# Patient Record
Sex: MALE | Race: OTHER | HISPANIC OR LATINO | URBAN - METROPOLITAN AREA
[De-identification: names, ages, dates, MRNs, and addresses within clinical notes are randomized per-mention and may not be internally consistent; named-entity substitution may affect disease eponyms.]

---

## 2022-09-21 ENCOUNTER — EMERGENCY (EMERGENCY)
Facility: HOSPITAL | Age: 62
LOS: 1 days | Discharge: HOME | End: 2022-09-21
Attending: EMERGENCY MEDICINE
Payer: COMMERCIAL

## 2022-09-21 VITALS
WEIGHT: 220.9 LBS | RESPIRATION RATE: 18 BRPM | TEMPERATURE: 98 F | HEART RATE: 92 BPM | SYSTOLIC BLOOD PRESSURE: 120 MMHG | OXYGEN SATURATION: 100 % | DIASTOLIC BLOOD PRESSURE: 77 MMHG

## 2022-09-21 VITALS
SYSTOLIC BLOOD PRESSURE: 128 MMHG | OXYGEN SATURATION: 99 % | RESPIRATION RATE: 18 BRPM | TEMPERATURE: 99 F | DIASTOLIC BLOOD PRESSURE: 79 MMHG | HEART RATE: 69 BPM

## 2022-09-21 LAB
ALBUMIN SERPL ELPH-MCNC: 4.1 G/DL — SIGNIFICANT CHANGE UP (ref 3.5–5)
ALP SERPL-CCNC: 72 U/L — SIGNIFICANT CHANGE UP (ref 40–120)
ALT FLD-CCNC: 25 U/L DA — SIGNIFICANT CHANGE UP (ref 10–60)
ANION GAP SERPL CALC-SCNC: 9 MMOL/L — SIGNIFICANT CHANGE UP (ref 5–17)
APPEARANCE UR: CLEAR — SIGNIFICANT CHANGE UP
AST SERPL-CCNC: 15 U/L — SIGNIFICANT CHANGE UP (ref 10–40)
BASOPHILS # BLD AUTO: 0.04 K/UL — SIGNIFICANT CHANGE UP (ref 0–0.2)
BASOPHILS NFR BLD AUTO: 0.4 % — SIGNIFICANT CHANGE UP (ref 0–2)
BILIRUB SERPL-MCNC: 0.4 MG/DL — SIGNIFICANT CHANGE UP (ref 0.2–1.2)
BILIRUB UR-MCNC: NEGATIVE — SIGNIFICANT CHANGE UP
BUN SERPL-MCNC: 23 MG/DL — HIGH (ref 7–18)
CALCIUM SERPL-MCNC: 8.9 MG/DL — SIGNIFICANT CHANGE UP (ref 8.4–10.5)
CHLORIDE SERPL-SCNC: 111 MMOL/L — HIGH (ref 96–108)
CO2 SERPL-SCNC: 21 MMOL/L — LOW (ref 22–31)
COLOR SPEC: YELLOW — SIGNIFICANT CHANGE UP
CREAT SERPL-MCNC: 1.09 MG/DL — SIGNIFICANT CHANGE UP (ref 0.5–1.3)
DIFF PNL FLD: ABNORMAL
EGFR: 76 ML/MIN/1.73M2 — SIGNIFICANT CHANGE UP
EOSINOPHIL # BLD AUTO: 0.02 K/UL — SIGNIFICANT CHANGE UP (ref 0–0.5)
EOSINOPHIL NFR BLD AUTO: 0.2 % — SIGNIFICANT CHANGE UP (ref 0–6)
GLUCOSE SERPL-MCNC: 121 MG/DL — HIGH (ref 70–99)
GLUCOSE UR QL: NEGATIVE — SIGNIFICANT CHANGE UP
HCT VFR BLD CALC: 36 % — LOW (ref 39–50)
HGB BLD-MCNC: 12.1 G/DL — LOW (ref 13–17)
HIV 1 & 2 AB SERPL IA.RAPID: SIGNIFICANT CHANGE UP
IMM GRANULOCYTES NFR BLD AUTO: 0.7 % — SIGNIFICANT CHANGE UP (ref 0–0.9)
KETONES UR-MCNC: NEGATIVE — SIGNIFICANT CHANGE UP
LACTATE SERPL-SCNC: 1.8 MMOL/L — SIGNIFICANT CHANGE UP (ref 0.7–2)
LEUKOCYTE ESTERASE UR-ACNC: ABNORMAL
LIDOCAIN IGE QN: 80 U/L — SIGNIFICANT CHANGE UP (ref 73–393)
LYMPHOCYTES # BLD AUTO: 1.35 K/UL — SIGNIFICANT CHANGE UP (ref 1–3.3)
LYMPHOCYTES # BLD AUTO: 13 % — SIGNIFICANT CHANGE UP (ref 13–44)
MCHC RBC-ENTMCNC: 31.1 PG — SIGNIFICANT CHANGE UP (ref 27–34)
MCHC RBC-ENTMCNC: 33.6 GM/DL — SIGNIFICANT CHANGE UP (ref 32–36)
MCV RBC AUTO: 92.5 FL — SIGNIFICANT CHANGE UP (ref 80–100)
MONOCYTES # BLD AUTO: 0.95 K/UL — HIGH (ref 0–0.9)
MONOCYTES NFR BLD AUTO: 9.1 % — SIGNIFICANT CHANGE UP (ref 2–14)
NEUTROPHILS # BLD AUTO: 7.99 K/UL — HIGH (ref 1.8–7.4)
NEUTROPHILS NFR BLD AUTO: 76.6 % — SIGNIFICANT CHANGE UP (ref 43–77)
NITRITE UR-MCNC: NEGATIVE — SIGNIFICANT CHANGE UP
NRBC # BLD: 0 /100 WBCS — SIGNIFICANT CHANGE UP (ref 0–0)
PH UR: 6.5 — SIGNIFICANT CHANGE UP (ref 5–8)
PLATELET # BLD AUTO: 286 K/UL — SIGNIFICANT CHANGE UP (ref 150–400)
POTASSIUM SERPL-MCNC: 3.7 MMOL/L — SIGNIFICANT CHANGE UP (ref 3.5–5.3)
POTASSIUM SERPL-SCNC: 3.7 MMOL/L — SIGNIFICANT CHANGE UP (ref 3.5–5.3)
PROT SERPL-MCNC: 7.4 G/DL — SIGNIFICANT CHANGE UP (ref 6–8.3)
PROT UR-MCNC: NEGATIVE — SIGNIFICANT CHANGE UP
RBC # BLD: 3.89 M/UL — LOW (ref 4.2–5.8)
RBC # FLD: 13.1 % — SIGNIFICANT CHANGE UP (ref 10.3–14.5)
SARS-COV-2 RNA SPEC QL NAA+PROBE: SIGNIFICANT CHANGE UP
SODIUM SERPL-SCNC: 141 MMOL/L — SIGNIFICANT CHANGE UP (ref 135–145)
SP GR SPEC: 1.01 — SIGNIFICANT CHANGE UP (ref 1.01–1.02)
UROBILINOGEN FLD QL: NEGATIVE — SIGNIFICANT CHANGE UP
WBC # BLD: 10.42 K/UL — SIGNIFICANT CHANGE UP (ref 3.8–10.5)
WBC # FLD AUTO: 10.42 K/UL — SIGNIFICANT CHANGE UP (ref 3.8–10.5)

## 2022-09-21 PROCEDURE — 99285 EMERGENCY DEPT VISIT HI MDM: CPT

## 2022-09-21 PROCEDURE — 74176 CT ABD & PELVIS W/O CONTRAST: CPT | Mod: 26,MA

## 2022-09-21 PROCEDURE — 51702 INSERT TEMP BLADDER CATH: CPT

## 2022-09-21 PROCEDURE — 81001 URINALYSIS AUTO W/SCOPE: CPT

## 2022-09-21 PROCEDURE — 87635 SARS-COV-2 COVID-19 AMP PRB: CPT

## 2022-09-21 PROCEDURE — 36415 COLL VENOUS BLD VENIPUNCTURE: CPT

## 2022-09-21 PROCEDURE — 74176 CT ABD & PELVIS W/O CONTRAST: CPT | Mod: MA

## 2022-09-21 PROCEDURE — 80053 COMPREHEN METABOLIC PANEL: CPT

## 2022-09-21 PROCEDURE — 86703 HIV-1/HIV-2 1 RESULT ANTBDY: CPT

## 2022-09-21 PROCEDURE — 87086 URINE CULTURE/COLONY COUNT: CPT

## 2022-09-21 PROCEDURE — 99284 EMERGENCY DEPT VISIT MOD MDM: CPT | Mod: 25

## 2022-09-21 PROCEDURE — 83690 ASSAY OF LIPASE: CPT

## 2022-09-21 PROCEDURE — 83605 ASSAY OF LACTIC ACID: CPT

## 2022-09-21 PROCEDURE — 85025 COMPLETE CBC W/AUTO DIFF WBC: CPT

## 2022-09-21 RX ORDER — SODIUM CHLORIDE 9 MG/ML
1000 INJECTION INTRAMUSCULAR; INTRAVENOUS; SUBCUTANEOUS ONCE
Refills: 0 | Status: DISCONTINUED | OUTPATIENT
Start: 2022-09-21 | End: 2022-09-24

## 2022-09-21 NOTE — ED PROVIDER NOTE - PATIENT PORTAL LINK FT
You can access the FollowMyHealth Patient Portal offered by [facility] by registering at the following website: [email or web address]. By joining [facility]’s FollowMyHealth portal, you will also be able to view your health information using other applications (apps) compatible with our system.

## 2022-09-21 NOTE — ED PROVIDER NOTE - CPE EDP RESP NORM
Lab Results   Component Value Date    HGBA1C 7 4 (H) 12/18/2020       Recent Labs     01/05/21  1645 01/05/21  2104 01/06/21  0736 01/06/21  1033   POCGLU 157* 122 98 157*       Blood Sugar Average: Last 72 hrs:  (P) 131 [id number]   Underwent definitive surgical management by the podiatry service  He received 14 days of antibiotics here, and is now clear of infection  Did have Staph bacteremia managed by the infectious disease service normal...

## 2022-09-21 NOTE — ED PROVIDER NOTE - NSFOLLOWUPINSTRUCTIONS_ED_ALL_ED_FT
Urinary Retention in Men    WHAT YOU NEED TO KNOW:    Urinary retention is a condition that develops when your bladder does not empty completely when you urinate.   Male Reproductive System         DISCHARGE INSTRUCTIONS:    Medicines:   •Medicines can help decrease the size of your prostate, fight infection, and help you urinate more easily.      •Take your medicine as directed. Contact your healthcare provider if you think your medicine is not helping or if you have side effects. Tell your provider if you are allergic to any medicine. Keep a list of the medicines, vitamins, and herbs you take. Include the amounts, and when and why you take them. Bring the list or the pill bottles to follow-up visits. Carry your medicine list with you in case of an emergency.      Parisi catheter care: You may need a Parisi catheter for up to 2 weeks at home. Healthcare providers will give you a smaller leg bag to collect urine. Keep the bag below your waist. This will prevent urine from flowing back into your bladder and causing an infection or other problems. Also, keep the tube free of kinks so the urine will drain properly. Do not pull on the catheter. This can cause pain and bleeding, and may cause the catheter to come out. Ask your healthcare provider or urologist for more information on Parisi catheter care.    Urinate regularly: When your catheter is removed, do not let your bladder become too full before you urinate. Set regular times each day to urinate. Urinate as soon as you feel the need or at least every 3 hours while you are awake. Do not drink liquids before you go to bed. Urinate right before you go to bed.    Follow up with your healthcare provider or urologist as directed: Write down your questions so you remember to ask them during your visits.     Contact your healthcare provider or urologist if:   •You have a fever.      •You have pain when you urinate.      •You have blood in your urine.      •You have problems with your catheter.      •You have questions or concerns about your condition or care.      Return to the emergency department if:   •You have severe abdominal pain.      •You are breathing faster than usual.      •Your heartbeat is faster than usual.      •Your face, hands, feet, or ankles are swollen.

## 2022-09-21 NOTE — ED ADULT TRIAGE NOTE - CHIEF COMPLAINT QUOTE
as per pt " I have lower abdominal pain for 1 day and severe pain will urinating and I have trouble in urinating because of pain "

## 2022-09-22 LAB
CULTURE RESULTS: NO GROWTH — SIGNIFICANT CHANGE UP
SPECIMEN SOURCE: SIGNIFICANT CHANGE UP

## 2022-09-22 PROCEDURE — 51703 INSERT BLADDER CATH COMPLEX: CPT

## 2022-09-30 PROBLEM — C61 MALIGNANT NEOPLASM OF PROSTATE: Chronic | Status: ACTIVE | Noted: 2022-09-21

## 2022-10-03 ENCOUNTER — OUTPATIENT (OUTPATIENT)
Dept: OUTPATIENT SERVICES | Facility: HOSPITAL | Age: 62
LOS: 1 days | End: 2022-10-03
Payer: COMMERCIAL

## 2022-10-03 VITALS
WEIGHT: 212.08 LBS | TEMPERATURE: 98 F | HEART RATE: 81 BPM | SYSTOLIC BLOOD PRESSURE: 122 MMHG | OXYGEN SATURATION: 99 % | HEIGHT: 68 IN | RESPIRATION RATE: 18 BRPM | DIASTOLIC BLOOD PRESSURE: 76 MMHG

## 2022-10-03 DIAGNOSIS — M54.50 LOW BACK PAIN, UNSPECIFIED: ICD-10-CM

## 2022-10-03 DIAGNOSIS — E55.9 VITAMIN D DEFICIENCY, UNSPECIFIED: ICD-10-CM

## 2022-10-03 DIAGNOSIS — N40.1 BENIGN PROSTATIC HYPERPLASIA WITH LOWER URINARY TRACT SYMPTOMS: ICD-10-CM

## 2022-10-03 DIAGNOSIS — Z01.818 ENCOUNTER FOR OTHER PREPROCEDURAL EXAMINATION: ICD-10-CM

## 2022-10-03 DIAGNOSIS — C61 MALIGNANT NEOPLASM OF PROSTATE: ICD-10-CM

## 2022-10-03 LAB — BLD GP AB SCN SERPL QL: SIGNIFICANT CHANGE UP

## 2022-10-03 PROCEDURE — G0463: CPT

## 2022-10-03 NOTE — H&P PST ADULT - ASSESSMENT
This is a 62 year old  male with PMH of vitamin D deficiency, low back pain, prostate cancer treated with chemotherapy presents with enlarged prostate with lower urinary tract symptoms. Pt is scheduled for cystoscopy, transurethral resection of prostate with green light laser on 10/11/2022.  MELYSSA stop bang score 4. Pt denies history of MELYSSA, never did sleep study.

## 2022-10-03 NOTE — H&P PST ADULT - FALL HARM RISK - UNIVERSAL INTERVENTIONS
Bed in lowest position, wheels locked, appropriate side rails in place/Call bell, personal items and telephone in reach/Instruct patient to call for assistance before getting out of bed or chair/Non-slip footwear when patient is out of bed/[place] to call system/Physically safe environment - no spills, clutter or unnecessary equipment/Purposeful Proactive Rounding/Room/bathroom lighting operational, light cord in reach

## 2022-10-03 NOTE — H&P PST ADULT - NEGATIVE GENERAL SYMPTOMS
Patient is waiting for an inhaler and something to stop smoking    [facility]/[place]       no fever/no chills/no sweating

## 2022-10-03 NOTE — H&P PST ADULT - GENITOURINARY COMMENTS
koehler catheter in situ draining yellow urine Parisi catheter in situ draining yellow urine Parisi catheter in situ due to urinary retention secondary to enlarged prostate; h/o prostate cancer

## 2022-10-03 NOTE — H&P PST ADULT - NSANTHOSAYNRD_GEN_A_CORE
No. MELYSSA screening performed.  STOP BANG Legend: 0-2 = LOW Risk; 3-4 = INTERMEDIATE Risk; 5-8 = HIGH Risk

## 2022-10-03 NOTE — H&P PST ADULT - NSICDXPASTMEDICALHX_GEN_ALL_CORE_FT
PAST MEDICAL HISTORY:  CA of prostate     Enlarged prostate with lower urinary tract symptoms (LUTS)

## 2022-10-03 NOTE — H&P PST ADULT - HISTORY OF PRESENT ILLNESS
This is a 62 year old  male with PMH of vitamin D deficiency, low back pain, prostate cancer treated with chemotherapy presents with c/o urinary retentiondue to enlarged prostate. Pt had Parisi catheter inserted in ER for relief. Pt is scheduled for cystoscopy, transurethral resection of prostate with green light laser on 10/11/2022.

## 2022-10-03 NOTE — H&P PST ADULT - PROBLEM SELECTOR PLAN 1
Pt is scheduled for cystoscopy, transurethral resection of prostate with green light laser on 10/11/2022.  MELYSSA stop bang score 4. Pt denies history of MELYSSA, never did sleep study.   Instructed to continue Tamsulosin.  Preoperative instructions discussed with pt and given to pt. Instructed pt to notify security when he arrives in the lobby of the \[facility]\"" that he is here for surgery, that he will need someone to come to the hospital to pick him up after surgery, not to eat or drink anything after midnight the night before the surgery, to avoid NSAIDs such as Ibuprofen, Motrin, Aleve, Advil, naproxen before surgery, to take Tylenol if needed for pain, to report if he has been exposed to anyone with any contagious diseases including Covid-19 and Monkeypox or if he is exhibiting any symptoms of COVID-19 or has any rash or if he is exhibiting any symptoms of COVID-19,  to keep appointment for COVID-19  test 3 days before surgery.    Instructed about use of Chlorhexidine 4% soap for 3 days before surgery including the morning of the surgery to prevent infection. Verbalized understanding of instructions given.

## 2022-10-03 NOTE — H&P PST ADULT - PROBLEM SELECTOR PLAN 3
Pt instructed to avoid NSAIDs such as aleve, naproxen 1 week before surgery.    Advised pt and pt's wife to take Tylenol as needed for pain. Stated understanding of instructions given.

## 2022-10-11 ENCOUNTER — OUTPATIENT (OUTPATIENT)
Dept: OUTPATIENT SERVICES | Facility: HOSPITAL | Age: 62
LOS: 1 days | End: 2022-10-11
Payer: COMMERCIAL

## 2022-10-11 VITALS
DIASTOLIC BLOOD PRESSURE: 80 MMHG | HEIGHT: 68 IN | SYSTOLIC BLOOD PRESSURE: 111 MMHG | OXYGEN SATURATION: 99 % | HEART RATE: 82 BPM | TEMPERATURE: 98 F | WEIGHT: 212.08 LBS | RESPIRATION RATE: 16 BRPM

## 2022-10-11 VITALS
HEART RATE: 72 BPM | DIASTOLIC BLOOD PRESSURE: 81 MMHG | TEMPERATURE: 98 F | SYSTOLIC BLOOD PRESSURE: 134 MMHG | RESPIRATION RATE: 16 BRPM | OXYGEN SATURATION: 100 %

## 2022-10-11 DIAGNOSIS — N40.1 BENIGN PROSTATIC HYPERPLASIA WITH LOWER URINARY TRACT SYMPTOMS: ICD-10-CM

## 2022-10-11 LAB — BLD GP AB SCN SERPL QL: SIGNIFICANT CHANGE UP

## 2022-10-11 PROCEDURE — 86850 RBC ANTIBODY SCREEN: CPT

## 2022-10-11 PROCEDURE — 36415 COLL VENOUS BLD VENIPUNCTURE: CPT

## 2022-10-11 PROCEDURE — 86901 BLOOD TYPING SEROLOGIC RH(D): CPT

## 2022-10-11 PROCEDURE — 86900 BLOOD TYPING SEROLOGIC ABO: CPT

## 2022-10-11 PROCEDURE — 52648 LASER SURGERY OF PROSTATE: CPT

## 2022-10-11 PROCEDURE — C1889: CPT

## 2022-10-11 DEVICE — FIBER MOXY REG: Type: IMPLANTABLE DEVICE | Site: CYSTOSCOPY TRANSURETHRA RESECTION OF PROSTATE | Status: FUNCTIONAL

## 2022-10-11 RX ORDER — SODIUM CHLORIDE 9 MG/ML
3 INJECTION INTRAMUSCULAR; INTRAVENOUS; SUBCUTANEOUS EVERY 8 HOURS
Refills: 0 | Status: DISCONTINUED | OUTPATIENT
Start: 2022-10-11 | End: 2022-10-11

## 2022-10-11 RX ORDER — ONDANSETRON 8 MG/1
4 TABLET, FILM COATED ORAL ONCE
Refills: 0 | Status: DISCONTINUED | OUTPATIENT
Start: 2022-10-11 | End: 2022-10-11

## 2022-10-11 RX ORDER — HYDROMORPHONE HYDROCHLORIDE 2 MG/ML
0.5 INJECTION INTRAMUSCULAR; INTRAVENOUS; SUBCUTANEOUS
Refills: 0 | Status: DISCONTINUED | OUTPATIENT
Start: 2022-10-11 | End: 2022-10-11

## 2022-10-11 RX ORDER — PHENAZOPYRIDINE HCL 100 MG
1 TABLET ORAL
Qty: 9 | Refills: 0
Start: 2022-10-11 | End: 2022-10-13

## 2022-10-11 RX ORDER — PHENAZOPYRIDINE HCL 100 MG
1 TABLET ORAL
Qty: 0 | Refills: 0 | DISCHARGE

## 2022-10-11 RX ORDER — CIPROFLOXACIN LACTATE 400MG/40ML
1 VIAL (ML) INTRAVENOUS
Qty: 6 | Refills: 0
Start: 2022-10-11 | End: 2022-10-13

## 2022-10-11 RX ORDER — PHENAZOPYRIDINE HCL 100 MG
100 TABLET ORAL ONCE
Refills: 0 | Status: COMPLETED | OUTPATIENT
Start: 2022-10-11 | End: 2022-10-11

## 2022-10-11 RX ORDER — FENTANYL CITRATE 50 UG/ML
25 INJECTION INTRAVENOUS
Refills: 0 | Status: DISCONTINUED | OUTPATIENT
Start: 2022-10-11 | End: 2022-10-11

## 2022-10-11 RX ADMIN — HYDROMORPHONE HYDROCHLORIDE 0.5 MILLIGRAM(S): 2 INJECTION INTRAMUSCULAR; INTRAVENOUS; SUBCUTANEOUS at 10:24

## 2022-10-11 RX ADMIN — HYDROMORPHONE HYDROCHLORIDE 0.5 MILLIGRAM(S): 2 INJECTION INTRAMUSCULAR; INTRAVENOUS; SUBCUTANEOUS at 10:11

## 2022-10-11 RX ADMIN — FENTANYL CITRATE 25 MICROGRAM(S): 50 INJECTION INTRAVENOUS at 11:14

## 2022-10-11 RX ADMIN — FENTANYL CITRATE 25 MICROGRAM(S): 50 INJECTION INTRAVENOUS at 10:47

## 2022-10-11 RX ADMIN — SODIUM CHLORIDE 3 MILLILITER(S): 9 INJECTION INTRAMUSCULAR; INTRAVENOUS; SUBCUTANEOUS at 07:27

## 2022-10-11 RX ADMIN — HYDROMORPHONE HYDROCHLORIDE 0.5 MILLIGRAM(S): 2 INJECTION INTRAMUSCULAR; INTRAVENOUS; SUBCUTANEOUS at 10:21

## 2022-10-11 RX ADMIN — Medication 100 MILLIGRAM(S): at 12:12

## 2022-10-11 NOTE — BRIEF OPERATIVE NOTE - NSICDXBRIEFPROCEDURE_GEN_ALL_CORE_FT
PROCEDURES:  Cystoscopy, with TURP using laser 11-Oct-2022 09:50:38 Greenlight channel turp for metastatic prostate cancer. Concepcion Boyd

## 2022-10-11 NOTE — ASU DISCHARGE PLAN (ADULT/PEDIATRIC) - ASU DC SPECIAL INSTRUCTIONSFT
CATHETER: the nurses will review instructions and care before you go home.   GENERAL: It is common to have blood in your urine after your procedure. It may be pink or even red; inform your doctor if you have a significant amount of clot in the urine or if you are unable to void at all or if your catheter stops draining. The urine may clear and then become bloody again especially as you are more physically active. It is not uncommon to have some burning when you urinate, this will gradually improve. With a catheter in place, it is not uncommon to have occasional leakage or urine or blood around the catheter. Please call your urologist if this is excessive and/or the urine is not draining through the catheter into the bag.  BATHING: You may shower or bathe. If going home with koehler, shower only until catheter is removed.  DIET: You may resume your regular diet and regular medication regimen.  PAIN: You may take Tylenol (acetaminophen) 650-975mg and/or Motrin (ibuprofen) 400-600mg, both available over the counter, for pain every 6 hours as needed. Do not exceed 4000mg of Tylenol (acetaminophen) daily. You may alternate these medications such that you take one or the other every 3 hours for around the clock pain coverage. Also give pyridium, take as instructed.  ANTIBIOTICS: You will be given a prescription for an antibiotic, please take this medication as instructed and be sure to complete the entire course.  STOOL SOFTENERS: Do not allow yourself to become constipated as straining may cause bleeding. Take stool softeners or a laxative (ex. Miralax, Colace, Senokot, ExLax, etc), available over the counter, if needed.  ACTIVITY: No heavy lifting or strenuous exercise until you are evaluated at your post-operative appointment. Otherwise, you may return to your usual level of physical activity.  FOLLOW-UP: Follow up Monday for TOV with Dr. farr.  CALL YOUR UROLOGIST IF: You have any bleeding that does not stop, inability to void >8 hours, fever over 100.4 F, chills, persistent nausea/vomiting, changes in your incision concerning for infection, or if your pain is not controlled on your discharge pain medications.

## 2022-10-11 NOTE — ASU DISCHARGE PLAN (ADULT/PEDIATRIC) - CARE PROVIDER_API CALL
Diogenes Goldsmith)  Urology  [address], [address] [place] [address]  Phone: [phone]  Fax: [phone]  Follow Up Time: 1 week

## 2022-10-11 NOTE — BRIEF OPERATIVE NOTE - OPERATION/FINDINGS
TURP, Greenlight, (Channel turp for bladder outlet obstruction 2/2 metastatic prostate cancer) With Parisi.

## 2023-08-29 ENCOUNTER — INPATIENT (INPATIENT)
Facility: HOSPITAL | Age: 63
LOS: 16 days | Discharge: HOSPICE | DRG: 871 | End: 2023-09-15
Attending: INTERNAL MEDICINE | Admitting: INTERNAL MEDICINE
Payer: COMMERCIAL

## 2023-08-29 VITALS
TEMPERATURE: 97 F | HEART RATE: 143 BPM | DIASTOLIC BLOOD PRESSURE: 53 MMHG | WEIGHT: 198.42 LBS | SYSTOLIC BLOOD PRESSURE: 142 MMHG | OXYGEN SATURATION: 99 % | RESPIRATION RATE: 20 BRPM

## 2023-08-29 PROBLEM — N40.1 BENIGN PROSTATIC HYPERPLASIA WITH LOWER URINARY TRACT SYMPTOMS: Chronic | Status: ACTIVE | Noted: 2022-10-03

## 2023-08-29 LAB
ALBUMIN SERPL ELPH-MCNC: 2.6 G/DL — LOW (ref 3.5–5)
ALP SERPL-CCNC: 172 U/L — HIGH (ref 40–120)
ALT FLD-CCNC: 17 U/L DA — SIGNIFICANT CHANGE UP (ref 10–60)
ANION GAP SERPL CALC-SCNC: 11 MMOL/L — SIGNIFICANT CHANGE UP (ref 5–17)
ANISOCYTOSIS BLD QL: SLIGHT — SIGNIFICANT CHANGE UP
APTT BLD: 31.1 SEC — SIGNIFICANT CHANGE UP (ref 24.5–35.6)
AST SERPL-CCNC: 17 U/L — SIGNIFICANT CHANGE UP (ref 10–40)
BASOPHILS # BLD AUTO: 0 K/UL — SIGNIFICANT CHANGE UP (ref 0–0.2)
BASOPHILS NFR BLD AUTO: 0 % — SIGNIFICANT CHANGE UP (ref 0–2)
BILIRUB SERPL-MCNC: 1.1 MG/DL — SIGNIFICANT CHANGE UP (ref 0.2–1.2)
BUN SERPL-MCNC: 43 MG/DL — HIGH (ref 7–18)
CALCIUM SERPL-MCNC: 7.8 MG/DL — LOW (ref 8.4–10.5)
CHLORIDE SERPL-SCNC: 97 MMOL/L — SIGNIFICANT CHANGE UP (ref 96–108)
CO2 SERPL-SCNC: 21 MMOL/L — LOW (ref 22–31)
CREAT SERPL-MCNC: 3.55 MG/DL — HIGH (ref 0.5–1.3)
EGFR: 18 ML/MIN/1.73M2 — LOW
EOSINOPHIL # BLD AUTO: 0 K/UL — SIGNIFICANT CHANGE UP (ref 0–0.5)
EOSINOPHIL NFR BLD AUTO: 0 % — SIGNIFICANT CHANGE UP (ref 0–6)
GLUCOSE SERPL-MCNC: 121 MG/DL — HIGH (ref 70–99)
HCT VFR BLD CALC: 29.8 % — LOW (ref 39–50)
HGB BLD-MCNC: 9.5 G/DL — LOW (ref 13–17)
HYPOCHROMIA BLD QL: SLIGHT — SIGNIFICANT CHANGE UP
INR BLD: 1.1 RATIO — SIGNIFICANT CHANGE UP (ref 0.85–1.18)
LACTATE SERPL-SCNC: 4.4 MMOL/L — CRITICAL HIGH (ref 0.7–2)
LG PLATELETS BLD QL AUTO: SLIGHT — SIGNIFICANT CHANGE UP
LYMPHOCYTES # BLD AUTO: 0.08 K/UL — LOW (ref 1–3.3)
LYMPHOCYTES # BLD AUTO: 75 % — HIGH (ref 13–44)
MANUAL SMEAR VERIFICATION: SIGNIFICANT CHANGE UP
MCHC RBC-ENTMCNC: 31.3 PG — SIGNIFICANT CHANGE UP (ref 27–34)
MCHC RBC-ENTMCNC: 31.9 GM/DL — LOW (ref 32–36)
MCV RBC AUTO: 98 FL — SIGNIFICANT CHANGE UP (ref 80–100)
MICROCYTES BLD QL: SLIGHT — SIGNIFICANT CHANGE UP
MONOCYTES # BLD AUTO: 0 K/UL — SIGNIFICANT CHANGE UP (ref 0–0.9)
MONOCYTES NFR BLD AUTO: 0 % — LOW (ref 2–14)
NEUTROPHILS # BLD AUTO: 0.03 K/UL — LOW (ref 1.8–7.4)
NEUTROPHILS NFR BLD AUTO: 25 % — LOW (ref 43–77)
NRBC # BLD: 0 /100 — SIGNIFICANT CHANGE UP (ref 0–0)
PLAT MORPH BLD: NORMAL — SIGNIFICANT CHANGE UP
PLATELET # BLD AUTO: 78 K/UL — LOW (ref 150–400)
POIKILOCYTOSIS BLD QL AUTO: SLIGHT — SIGNIFICANT CHANGE UP
POTASSIUM SERPL-MCNC: 4.5 MMOL/L — SIGNIFICANT CHANGE UP (ref 3.5–5.3)
POTASSIUM SERPL-SCNC: 4.5 MMOL/L — SIGNIFICANT CHANGE UP (ref 3.5–5.3)
PROT SERPL-MCNC: 7.7 G/DL — SIGNIFICANT CHANGE UP (ref 6–8.3)
PROTHROM AB SERPL-ACNC: 12.5 SEC — SIGNIFICANT CHANGE UP (ref 9.5–13)
RBC # BLD: 3.04 M/UL — LOW (ref 4.2–5.8)
RBC # FLD: 18.9 % — HIGH (ref 10.3–14.5)
RBC BLD AUTO: ABNORMAL
SODIUM SERPL-SCNC: 129 MMOL/L — LOW (ref 135–145)
WBC # BLD: 0.1 K/UL — CRITICAL LOW (ref 3.8–10.5)
WBC # FLD AUTO: 0.1 K/UL — CRITICAL LOW (ref 3.8–10.5)

## 2023-08-29 PROCEDURE — 70450 CT HEAD/BRAIN W/O DYE: CPT | Mod: 26,MA

## 2023-08-29 PROCEDURE — 72125 CT NECK SPINE W/O DYE: CPT | Mod: 26,MA

## 2023-08-29 PROCEDURE — 74176 CT ABD & PELVIS W/O CONTRAST: CPT | Mod: 26,MA

## 2023-08-29 PROCEDURE — 99285 EMERGENCY DEPT VISIT HI MDM: CPT

## 2023-08-29 RX ORDER — CEFEPIME 1 G/1
2000 INJECTION, POWDER, FOR SOLUTION INTRAMUSCULAR; INTRAVENOUS ONCE
Refills: 0 | Status: COMPLETED | OUTPATIENT
Start: 2023-08-29 | End: 2023-08-29

## 2023-08-29 RX ORDER — SODIUM CHLORIDE 9 MG/ML
1000 INJECTION, SOLUTION INTRAVENOUS ONCE
Refills: 0 | Status: COMPLETED | OUTPATIENT
Start: 2023-08-29 | End: 2023-08-29

## 2023-08-29 RX ORDER — SODIUM CHLORIDE 9 MG/ML
1000 INJECTION INTRAMUSCULAR; INTRAVENOUS; SUBCUTANEOUS ONCE
Refills: 0 | Status: COMPLETED | OUTPATIENT
Start: 2023-08-29 | End: 2023-08-29

## 2023-08-29 RX ADMIN — SODIUM CHLORIDE 1000 MILLILITER(S): 9 INJECTION INTRAMUSCULAR; INTRAVENOUS; SUBCUTANEOUS at 22:48

## 2023-08-29 RX ADMIN — CEFEPIME 100 MILLIGRAM(S): 1 INJECTION, POWDER, FOR SOLUTION INTRAMUSCULAR; INTRAVENOUS at 22:48

## 2023-08-29 NOTE — ED ADULT TRIAGE NOTE - CHIEF COMPLAINT QUOTE
Hematuria and urine retention since yesterday, gen weakness w dizziness, s/p fall in bed today , hx prostate CA w mets

## 2023-08-29 NOTE — ED PROVIDER NOTE - CARE PLAN
1 Principal Discharge DX:	Neutropenic fever  Secondary Diagnosis:	JOSE ANGEL (acute kidney injury)

## 2023-08-29 NOTE — ED PROVIDER NOTE - PHYSICAL EXAMINATION
CONSTITUTIONAL: non-toxic, well appearing  SKIN: no rash, no petechiae.  EYES: PERRL, EOMI, pink conjunctiva, anicteric  ENT: tongue and uvular midline, no exudates, moist mucous membranes  NECK: Supple; no meningismus, no JVD  CARD: RRR, no murmurs, equal radial pulses bilaterally 2+  RESP: CTAB, no respiratory distress  ABD: Soft, non-tender, non-distended, no peritoneal signs, no CVA tenderness  SPINE: no midline bony tenderness  EXT: Normal ROM x4. No edema.   NEURO: Alert, oriented. Neuro exam nonfocal, equal strength bilaterally  PSYCH: Cooperative, appropriate.

## 2023-08-29 NOTE — ED PROVIDER NOTE - CLINICAL SUMMARY MEDICAL DECISION MAKING FREE TEXT BOX
Genet: 63-year-old male with past medical history of prostate cancer on chemotherapy (last chemo approxi-1 week ago, on prednisone 5 mg daily) presents with dysuria since yesterday.  Patient reports pain with urination, urinary urgency, and blood in urine since yesterday.  Patient reports suprapubic discomfort and bilateral flank discomfort.  Patient reports generalized weakness, states he got a bed today and fainted, hitting his head on the ground.  Denies any aspirin or anticoagulation use.  Denies any fevers, chest pain, shortness of breath, vomiting, bloody stools, tarry stools, numbness, focal weakness, or rash.  Denies any aspirin or anticoagulation use.  Physical exam per above. Likely UTI, will obtain labs r/o thrombocytopenia r/o coagulopathy, imaging r/o stone/hydronephrosis r/o ICH, will likely require admission.

## 2023-08-29 NOTE — ED ADULT NURSE NOTE - NSFALLRISKINTERV_ED_ALL_ED
Assistance OOB with selected safe patient handling equipment if applicable/Communicate fall risk and risk factors to all staff, patient, and family/Provide patient with walking aids/Provide visual cue: yellow wristband, yellow gown, etc/Reinforce activity limits and safety measures with patient and family/Call bell, personal items and telephone in reach/Instruct patient to call for assistance before getting out of bed/chair/stretcher/Non-slip footwear applied when patient is off stretcher/[place] to call system/Physically safe environment - no spills, clutter or unnecessary equipment/Purposeful Proactive Rounding/Room/bathroom lighting operational, light cord in reach

## 2023-08-29 NOTE — ED ADULT NURSE NOTE - ED STAT RN HANDOFF DETAILS
Report given to Reinier CLANCY. Unable to placed an IV line. Pt states he is a chemo patient and will need ultrasound. Dr. Montenegro is aware. No distress noted.

## 2023-08-29 NOTE — ED ADULT NURSE NOTE - OBJECTIVE STATEMENT
Patient came to the ED a/o x 3 for hematuria and urinary retention noted. No episode of bloody urine in the ED noted.

## 2023-08-29 NOTE — ED PROVIDER NOTE - OBJECTIVE STATEMENT
63-year-old male with past medical history of prostate cancer on chemotherapy (last chemo approxi-1 week ago, on prednisone 5 mg daily) presents with dysuria since yesterday.  Patient reports pain with urination, urinary urgency, and blood in urine since yesterday.  Patient reports suprapubic discomfort and bilateral flank discomfort.  Patient reports generalized weakness, states he got a bed today and fainted, hitting his head on the ground.  Denies any aspirin or anticoagulation use.  Denies any fevers, chest pain, shortness of breath, vomiting, bloody stools, tarry stools, numbness, focal weakness, or rash.  Denies any aspirin or anticoagulation use.  Denies any additional complaints.

## 2023-08-29 NOTE — ED PROVIDER NOTE - PROGRESS NOTE DETAILS
Lucks-DO: pt with JOSE ANGEL, CT performed with collapsed bladder, mild left hydronephrosis without obstruction noted. Parisi placed with minimal urine output. Labs showing neutropenia, temp 100.0F, likely neutropenic fever. Patient persistently tachycardic with BP 80s/50s, mentating well. ICU consulted, pending eval/recs. If patient persistently hypotensive despite fluids, will likely require pressors for presumptive septic shock. Lan-DO: pt seen by ICU, accepted for admission.

## 2023-08-30 DIAGNOSIS — R09.89 OTHER SPECIFIED SYMPTOMS AND SIGNS INVOLVING THE CIRCULATORY AND RESPIRATORY SYSTEMS: ICD-10-CM

## 2023-08-30 DIAGNOSIS — D70.9 NEUTROPENIA, UNSPECIFIED: ICD-10-CM

## 2023-08-30 LAB
ALBUMIN SERPL ELPH-MCNC: 1.8 G/DL — LOW (ref 3.5–5)
ALBUMIN SERPL ELPH-MCNC: 2 G/DL — LOW (ref 3.5–5)
ALP SERPL-CCNC: 111 U/L — SIGNIFICANT CHANGE UP (ref 40–120)
ALP SERPL-CCNC: 130 U/L — HIGH (ref 40–120)
ALT FLD-CCNC: 13 U/L DA — SIGNIFICANT CHANGE UP (ref 10–60)
ALT FLD-CCNC: 17 U/L DA — SIGNIFICANT CHANGE UP (ref 10–60)
ANION GAP SERPL CALC-SCNC: 15 MMOL/L — SIGNIFICANT CHANGE UP (ref 5–17)
ANION GAP SERPL CALC-SCNC: 16 MMOL/L — SIGNIFICANT CHANGE UP (ref 5–17)
ANION GAP SERPL CALC-SCNC: 16 MMOL/L — SIGNIFICANT CHANGE UP (ref 5–17)
ANISOCYTOSIS BLD QL: SLIGHT — SIGNIFICANT CHANGE UP
APTT BLD: 31 SEC — SIGNIFICANT CHANGE UP (ref 24.5–35.6)
AST SERPL-CCNC: 15 U/L — SIGNIFICANT CHANGE UP (ref 10–40)
AST SERPL-CCNC: 19 U/L — SIGNIFICANT CHANGE UP (ref 10–40)
BASE EXCESS BLDV CALC-SCNC: -11.9 MMOL/L — SIGNIFICANT CHANGE UP
BASOPHILS # BLD AUTO: 0 K/UL — SIGNIFICANT CHANGE UP (ref 0–0.2)
BASOPHILS # BLD AUTO: 0 K/UL — SIGNIFICANT CHANGE UP (ref 0–0.2)
BASOPHILS NFR BLD AUTO: 0 % — SIGNIFICANT CHANGE UP (ref 0–2)
BASOPHILS NFR BLD AUTO: 0 % — SIGNIFICANT CHANGE UP (ref 0–2)
BILIRUB SERPL-MCNC: 1.1 MG/DL — SIGNIFICANT CHANGE UP (ref 0.2–1.2)
BLD GP AB SCN SERPL QL: SIGNIFICANT CHANGE UP
BUN SERPL-MCNC: 47 MG/DL — HIGH (ref 7–18)
BUN SERPL-MCNC: 49 MG/DL — HIGH (ref 7–18)
BUN SERPL-MCNC: 52 MG/DL — HIGH (ref 7–18)
BURR CELLS BLD QL SMEAR: PRESENT — SIGNIFICANT CHANGE UP
CALCIUM SERPL-MCNC: 6.6 MG/DL — LOW (ref 8.4–10.5)
CALCIUM SERPL-MCNC: 6.6 MG/DL — LOW (ref 8.4–10.5)
CALCIUM SERPL-MCNC: 7.2 MG/DL — LOW (ref 8.4–10.5)
CHLORIDE SERPL-SCNC: 100 MMOL/L — SIGNIFICANT CHANGE UP (ref 96–108)
CHLORIDE SERPL-SCNC: 100 MMOL/L — SIGNIFICANT CHANGE UP (ref 96–108)
CHLORIDE SERPL-SCNC: 98 MMOL/L — SIGNIFICANT CHANGE UP (ref 96–108)
CK MB BLD-MCNC: <4 % — HIGH (ref 0–3.5)
CK MB CFR SERPL CALC: <1 NG/ML — SIGNIFICANT CHANGE UP (ref 0–3.6)
CK SERPL-CCNC: 25 U/L — LOW (ref 35–232)
CO2 SERPL-SCNC: 13 MMOL/L — LOW (ref 22–31)
CO2 SERPL-SCNC: 13 MMOL/L — LOW (ref 22–31)
CO2 SERPL-SCNC: 15 MMOL/L — LOW (ref 22–31)
CREAT SERPL-MCNC: 4.09 MG/DL — HIGH (ref 0.5–1.3)
CREAT SERPL-MCNC: 4.19 MG/DL — HIGH (ref 0.5–1.3)
CREAT SERPL-MCNC: 4.29 MG/DL — HIGH (ref 0.5–1.3)
D DIMER BLD IA.RAPID-MCNC: 3696 NG/ML DDU — HIGH
EGFR: 15 ML/MIN/1.73M2 — LOW
EGFR: 15 ML/MIN/1.73M2 — LOW
EGFR: 16 ML/MIN/1.73M2 — LOW
EOSINOPHIL # BLD AUTO: 0 K/UL — SIGNIFICANT CHANGE UP (ref 0–0.5)
EOSINOPHIL # BLD AUTO: 0 K/UL — SIGNIFICANT CHANGE UP (ref 0–0.5)
EOSINOPHIL NFR BLD AUTO: 0 % — SIGNIFICANT CHANGE UP (ref 0–6)
EOSINOPHIL NFR BLD AUTO: 0 % — SIGNIFICANT CHANGE UP (ref 0–6)
FIBRINOGEN PPP-MCNC: 994 MG/DL — HIGH (ref 200–475)
GAS PNL BLDA: SIGNIFICANT CHANGE UP
GAS PNL BLDA: SIGNIFICANT CHANGE UP
GLUCOSE SERPL-MCNC: 160 MG/DL — HIGH (ref 70–99)
GLUCOSE SERPL-MCNC: 181 MG/DL — HIGH (ref 70–99)
GLUCOSE SERPL-MCNC: 97 MG/DL — SIGNIFICANT CHANGE UP (ref 70–99)
HCO3 BLDV-SCNC: 14 MMOL/L — LOW (ref 22–29)
HCT VFR BLD CALC: 25.8 % — LOW (ref 39–50)
HCT VFR BLD CALC: 26 % — LOW (ref 39–50)
HGB BLD-MCNC: 8.4 G/DL — LOW (ref 13–17)
HGB BLD-MCNC: 8.5 G/DL — LOW (ref 13–17)
IMM GRANULOCYTES NFR BLD AUTO: 0 % — SIGNIFICANT CHANGE UP (ref 0–0.9)
INR BLD: 1.35 RATIO — HIGH (ref 0.85–1.18)
LACTATE SERPL-SCNC: 3.2 MMOL/L — HIGH (ref 0.7–2)
LACTATE SERPL-SCNC: 5.2 MMOL/L — CRITICAL HIGH (ref 0.7–2)
LACTATE SERPL-SCNC: 6.3 MMOL/L — CRITICAL HIGH (ref 0.7–2)
LDH SERPL L TO P-CCNC: 196 U/L — SIGNIFICANT CHANGE UP (ref 120–225)
LYMPHOCYTES # BLD AUTO: 0.04 K/UL — LOW (ref 1–3.3)
LYMPHOCYTES # BLD AUTO: 0.09 K/UL — LOW (ref 1–3.3)
LYMPHOCYTES # BLD AUTO: 80 % — HIGH (ref 13–44)
LYMPHOCYTES # BLD AUTO: 81.8 % — HIGH (ref 13–44)
MACROCYTES BLD QL: SLIGHT — SIGNIFICANT CHANGE UP
MAGNESIUM SERPL-MCNC: 1.4 MG/DL — LOW (ref 1.6–2.6)
MAGNESIUM SERPL-MCNC: 2.3 MG/DL — SIGNIFICANT CHANGE UP (ref 1.6–2.6)
MANUAL SMEAR VERIFICATION: SIGNIFICANT CHANGE UP
MCHC RBC-ENTMCNC: 31.3 PG — SIGNIFICANT CHANGE UP (ref 27–34)
MCHC RBC-ENTMCNC: 31.5 PG — SIGNIFICANT CHANGE UP (ref 27–34)
MCHC RBC-ENTMCNC: 32.3 GM/DL — SIGNIFICANT CHANGE UP (ref 32–36)
MCHC RBC-ENTMCNC: 32.9 GM/DL — SIGNIFICANT CHANGE UP (ref 32–36)
MCV RBC AUTO: 95.6 FL — SIGNIFICANT CHANGE UP (ref 80–100)
MCV RBC AUTO: 97 FL — SIGNIFICANT CHANGE UP (ref 80–100)
METAMYELOCYTES # FLD: 9.1 % — HIGH (ref 0–0)
MICROCYTES BLD QL: SLIGHT — SIGNIFICANT CHANGE UP
MONOCYTES # BLD AUTO: 0 K/UL — SIGNIFICANT CHANGE UP (ref 0–0.9)
MONOCYTES # BLD AUTO: 0.01 K/UL — SIGNIFICANT CHANGE UP (ref 0–0.9)
MONOCYTES NFR BLD AUTO: 0 % — LOW (ref 2–14)
MONOCYTES NFR BLD AUTO: 9.1 % — SIGNIFICANT CHANGE UP (ref 2–14)
MRSA PCR RESULT.: SIGNIFICANT CHANGE UP
NEUTROPHILS # BLD AUTO: 0 K/UL — LOW (ref 1.8–7.4)
NEUTROPHILS # BLD AUTO: 0.01 K/UL — LOW (ref 1.8–7.4)
NEUTROPHILS NFR BLD AUTO: 0 % — LOW (ref 43–77)
NEUTROPHILS NFR BLD AUTO: 20 % — LOW (ref 43–77)
NRBC # BLD: 0 /100 WBCS — SIGNIFICANT CHANGE UP (ref 0–0)
NRBC # BLD: 0 /100 — SIGNIFICANT CHANGE UP (ref 0–0)
NT-PROBNP SERPL-SCNC: HIGH PG/ML (ref 0–125)
OSMOLALITY UR: 294 MOS/KG — SIGNIFICANT CHANGE UP (ref 50–1200)
PCO2 BLDV: 31 MMHG — LOW (ref 42–55)
PH BLDV: 7.26 — LOW (ref 7.32–7.43)
PHOSPHATE SERPL-MCNC: 2.2 MG/DL — LOW (ref 2.5–4.5)
PHOSPHATE SERPL-MCNC: 5.5 MG/DL — HIGH (ref 2.5–4.5)
PLAT MORPH BLD: NORMAL — SIGNIFICANT CHANGE UP
PLATELET # BLD AUTO: 19 K/UL — CRITICAL LOW (ref 150–400)
PLATELET # BLD AUTO: 36 K/UL — LOW (ref 150–400)
PO2 BLDV: 52 MMHG — SIGNIFICANT CHANGE UP
POIKILOCYTOSIS BLD QL AUTO: SLIGHT — SIGNIFICANT CHANGE UP
POTASSIUM SERPL-MCNC: 3.9 MMOL/L — SIGNIFICANT CHANGE UP (ref 3.5–5.3)
POTASSIUM SERPL-MCNC: 3.9 MMOL/L — SIGNIFICANT CHANGE UP (ref 3.5–5.3)
POTASSIUM SERPL-MCNC: 4.1 MMOL/L — SIGNIFICANT CHANGE UP (ref 3.5–5.3)
POTASSIUM SERPL-SCNC: 3.9 MMOL/L — SIGNIFICANT CHANGE UP (ref 3.5–5.3)
POTASSIUM SERPL-SCNC: 3.9 MMOL/L — SIGNIFICANT CHANGE UP (ref 3.5–5.3)
POTASSIUM SERPL-SCNC: 4.1 MMOL/L — SIGNIFICANT CHANGE UP (ref 3.5–5.3)
PROT SERPL-MCNC: 5.9 G/DL — LOW (ref 6–8.3)
PROT SERPL-MCNC: 6.4 G/DL — SIGNIFICANT CHANGE UP (ref 6–8.3)
PROTHROM AB SERPL-ACNC: 15.3 SEC — HIGH (ref 9.5–13)
RAPID RVP RESULT: SIGNIFICANT CHANGE UP
RBC # BLD: 2.68 M/UL — LOW (ref 4.2–5.8)
RBC # BLD: 2.7 M/UL — LOW (ref 4.2–5.8)
RBC # FLD: 18.7 % — HIGH (ref 10.3–14.5)
RBC # FLD: 18.8 % — HIGH (ref 10.3–14.5)
RBC BLD AUTO: ABNORMAL
RETICS #: 6.7 K/UL — LOW (ref 25–125)
RETICS/RBC NFR: 0.3 % — LOW (ref 0.5–2.5)
S AUREUS DNA NOSE QL NAA+PROBE: SIGNIFICANT CHANGE UP
SAO2 % BLDV: 83.8 % — SIGNIFICANT CHANGE UP
SARS-COV-2 RNA SPEC QL NAA+PROBE: SIGNIFICANT CHANGE UP
SCHISTOCYTES BLD QL AUTO: SLIGHT — SIGNIFICANT CHANGE UP
SODIUM SERPL-SCNC: 128 MMOL/L — LOW (ref 135–145)
SODIUM SERPL-SCNC: 129 MMOL/L — LOW (ref 135–145)
SODIUM SERPL-SCNC: 129 MMOL/L — LOW (ref 135–145)
SODIUM UR-SCNC: 104 MMOL/L — SIGNIFICANT CHANGE UP
TROPONIN I, HIGH SENSITIVITY RESULT: 156.7 NG/L — HIGH
TROPONIN I, HIGH SENSITIVITY RESULT: 280.5 NG/L — HIGH
TROPONIN I, HIGH SENSITIVITY RESULT: 508.6 NG/L — HIGH
WBC # BLD: 0.05 K/UL — CRITICAL LOW (ref 3.8–10.5)
WBC # BLD: 0.11 K/UL — CRITICAL LOW (ref 3.8–10.5)
WBC # FLD AUTO: 0.05 K/UL — CRITICAL LOW (ref 3.8–10.5)
WBC # FLD AUTO: 0.11 K/UL — CRITICAL LOW (ref 3.8–10.5)

## 2023-08-30 PROCEDURE — 71045 X-RAY EXAM CHEST 1 VIEW: CPT | Mod: 26,76

## 2023-08-30 RX ORDER — SODIUM CHLORIDE 9 MG/ML
1000 INJECTION, SOLUTION INTRAVENOUS
Refills: 0 | Status: DISCONTINUED | OUTPATIENT
Start: 2023-08-30 | End: 2023-08-30

## 2023-08-30 RX ORDER — MAGNESIUM SULFATE 500 MG/ML
2 VIAL (ML) INJECTION ONCE
Refills: 0 | Status: COMPLETED | OUTPATIENT
Start: 2023-08-30 | End: 2023-08-30

## 2023-08-30 RX ORDER — NOREPINEPHRINE BITARTRATE/D5W 8 MG/250ML
0.77 PLASTIC BAG, INJECTION (ML) INTRAVENOUS
Qty: 32 | Refills: 0 | Status: DISCONTINUED | OUTPATIENT
Start: 2023-08-30 | End: 2023-09-02

## 2023-08-30 RX ORDER — NOREPINEPHRINE BITARTRATE/D5W 8 MG/250ML
0.05 PLASTIC BAG, INJECTION (ML) INTRAVENOUS
Qty: 16 | Refills: 0 | Status: DISCONTINUED | OUTPATIENT
Start: 2023-08-30 | End: 2023-08-30

## 2023-08-30 RX ORDER — SODIUM CHLORIDE 9 MG/ML
1000 INJECTION, SOLUTION INTRAVENOUS ONCE
Refills: 0 | Status: COMPLETED | OUTPATIENT
Start: 2023-08-30 | End: 2023-08-30

## 2023-08-30 RX ORDER — VASOPRESSIN 20 [USP'U]/ML
0.04 INJECTION INTRAVENOUS
Qty: 40 | Refills: 0 | Status: DISCONTINUED | OUTPATIENT
Start: 2023-08-30 | End: 2023-08-30

## 2023-08-30 RX ORDER — ACETAMINOPHEN 500 MG
1000 TABLET ORAL ONCE
Refills: 0 | Status: COMPLETED | OUTPATIENT
Start: 2023-08-30 | End: 2023-08-30

## 2023-08-30 RX ORDER — HYDROCORTISONE 20 MG
50 TABLET ORAL EVERY 6 HOURS
Refills: 0 | Status: DISCONTINUED | OUTPATIENT
Start: 2023-08-30 | End: 2023-09-05

## 2023-08-30 RX ORDER — FILGRASTIM 480MCG/1.6
480 VIAL (ML) INJECTION ONCE
Refills: 0 | Status: DISCONTINUED | OUTPATIENT
Start: 2023-08-30 | End: 2023-08-30

## 2023-08-30 RX ORDER — CEFEPIME 1 G/1
2000 INJECTION, POWDER, FOR SOLUTION INTRAMUSCULAR; INTRAVENOUS EVERY 24 HOURS
Refills: 0 | Status: DISCONTINUED | OUTPATIENT
Start: 2023-08-30 | End: 2023-08-30

## 2023-08-30 RX ORDER — LIDOCAINE HCL 20 MG/ML
5 VIAL (ML) INJECTION EVERY 12 HOURS
Refills: 0 | Status: DISCONTINUED | OUTPATIENT
Start: 2023-08-30 | End: 2023-09-15

## 2023-08-30 RX ORDER — CHLORHEXIDINE GLUCONATE 213 G/1000ML
1 SOLUTION TOPICAL
Refills: 0 | Status: DISCONTINUED | OUTPATIENT
Start: 2023-08-30 | End: 2023-08-30

## 2023-08-30 RX ORDER — VASOPRESSIN 20 [USP'U]/ML
0.04 INJECTION INTRAVENOUS
Qty: 40 | Refills: 0 | Status: DISCONTINUED | OUTPATIENT
Start: 2023-08-30 | End: 2023-09-01

## 2023-08-30 RX ORDER — MEROPENEM 1 G/30ML
500 INJECTION INTRAVENOUS ONCE
Refills: 0 | Status: COMPLETED | OUTPATIENT
Start: 2023-08-30 | End: 2023-08-30

## 2023-08-30 RX ORDER — MEROPENEM 1 G/30ML
INJECTION INTRAVENOUS
Refills: 0 | Status: DISCONTINUED | OUTPATIENT
Start: 2023-08-30 | End: 2023-09-02

## 2023-08-30 RX ORDER — DENOSUMAB 60 MG/ML
1.7 INJECTION SUBCUTANEOUS
Qty: 0 | Refills: 0 | DISCHARGE

## 2023-08-30 RX ORDER — MEROPENEM 1 G/30ML
500 INJECTION INTRAVENOUS EVERY 12 HOURS
Refills: 0 | Status: DISCONTINUED | OUTPATIENT
Start: 2023-08-30 | End: 2023-08-30

## 2023-08-30 RX ORDER — APALUTAMIDE 240 MG/1
4 TABLET, FILM COATED ORAL
Qty: 0 | Refills: 0 | DISCHARGE

## 2023-08-30 RX ORDER — ACETAMINOPHEN 500 MG
2 TABLET ORAL
Qty: 0 | Refills: 0 | DISCHARGE

## 2023-08-30 RX ORDER — ERGOCALCIFEROL 1.25 MG/1
1 CAPSULE ORAL
Qty: 0 | Refills: 0 | DISCHARGE

## 2023-08-30 RX ORDER — SODIUM CHLORIDE 9 MG/ML
10 INJECTION INTRAMUSCULAR; INTRAVENOUS; SUBCUTANEOUS
Refills: 0 | Status: DISCONTINUED | OUTPATIENT
Start: 2023-08-30 | End: 2023-08-30

## 2023-08-30 RX ORDER — IBUPROFEN 200 MG
1 TABLET ORAL
Qty: 0 | Refills: 0 | DISCHARGE

## 2023-08-30 RX ORDER — NOREPINEPHRINE BITARTRATE/D5W 8 MG/250ML
1.5 PLASTIC BAG, INJECTION (ML) INTRAVENOUS
Qty: 8 | Refills: 0 | Status: DISCONTINUED | OUTPATIENT
Start: 2023-08-30 | End: 2023-08-30

## 2023-08-30 RX ORDER — CHLORHEXIDINE GLUCONATE 213 G/1000ML
1 SOLUTION TOPICAL DAILY
Refills: 0 | Status: DISCONTINUED | OUTPATIENT
Start: 2023-08-30 | End: 2023-09-02

## 2023-08-30 RX ORDER — MEROPENEM 1 G/30ML
500 INJECTION INTRAVENOUS EVERY 12 HOURS
Refills: 0 | Status: DISCONTINUED | OUTPATIENT
Start: 2023-08-30 | End: 2023-09-02

## 2023-08-30 RX ORDER — HYDROMORPHONE HYDROCHLORIDE 2 MG/ML
0.5 INJECTION INTRAMUSCULAR; INTRAVENOUS; SUBCUTANEOUS EVERY 4 HOURS
Refills: 0 | Status: DISCONTINUED | OUTPATIENT
Start: 2023-08-30 | End: 2023-09-01

## 2023-08-30 RX ORDER — VANCOMYCIN HCL 1 G
1250 VIAL (EA) INTRAVENOUS ONCE
Refills: 0 | Status: COMPLETED | OUTPATIENT
Start: 2023-08-30 | End: 2023-08-30

## 2023-08-30 RX ORDER — FILGRASTIM 480MCG/1.6
480 VIAL (ML) INJECTION DAILY
Refills: 0 | Status: DISCONTINUED | OUTPATIENT
Start: 2023-08-30 | End: 2023-08-30

## 2023-08-30 RX ORDER — PROCHLORPERAZINE MALEATE 5 MG
1 TABLET ORAL
Refills: 0 | DISCHARGE

## 2023-08-30 RX ORDER — HYDROCORTISONE 20 MG
200 TABLET ORAL ONCE
Refills: 0 | Status: COMPLETED | OUTPATIENT
Start: 2023-08-30 | End: 2023-08-30

## 2023-08-30 RX ORDER — MAGNESIUM SULFATE 500 MG/ML
1 VIAL (ML) INJECTION ONCE
Refills: 0 | Status: COMPLETED | OUTPATIENT
Start: 2023-08-30 | End: 2023-08-30

## 2023-08-30 RX ORDER — FILGRASTIM 480MCG/1.6
480 VIAL (ML) INJECTION DAILY
Refills: 0 | Status: DISCONTINUED | OUTPATIENT
Start: 2023-08-30 | End: 2023-09-05

## 2023-08-30 RX ADMIN — SODIUM CHLORIDE 1000 MILLILITER(S): 9 INJECTION, SOLUTION INTRAVENOUS at 00:01

## 2023-08-30 RX ADMIN — Medication 50 MILLIGRAM(S): at 17:07

## 2023-08-30 RX ADMIN — Medication 5 MILLILITER(S): at 10:44

## 2023-08-30 RX ADMIN — HYDROMORPHONE HYDROCHLORIDE 0.5 MILLIGRAM(S): 2 INJECTION INTRAMUSCULAR; INTRAVENOUS; SUBCUTANEOUS at 14:05

## 2023-08-30 RX ADMIN — SODIUM CHLORIDE 100 MILLILITER(S): 9 INJECTION, SOLUTION INTRAVENOUS at 09:12

## 2023-08-30 RX ADMIN — Medication 1000 MILLIGRAM(S): at 01:30

## 2023-08-30 RX ADMIN — Medication 1000 MILLIGRAM(S): at 09:23

## 2023-08-30 RX ADMIN — Medication 50 MILLIGRAM(S): at 05:46

## 2023-08-30 RX ADMIN — Medication 100 GRAM(S): at 05:46

## 2023-08-30 RX ADMIN — Medication 25 GRAM(S): at 10:48

## 2023-08-30 RX ADMIN — Medication 400 MILLIGRAM(S): at 09:12

## 2023-08-30 RX ADMIN — Medication 268 MICROGRAM(S)/KG/MIN: at 05:45

## 2023-08-30 RX ADMIN — SODIUM CHLORIDE 100 MILLILITER(S): 9 INJECTION, SOLUTION INTRAVENOUS at 05:45

## 2023-08-30 RX ADMIN — Medication 68.7 MICROGRAM(S)/KG/MIN: at 10:47

## 2023-08-30 RX ADMIN — Medication 166.67 MILLIGRAM(S): at 09:42

## 2023-08-30 RX ADMIN — Medication 8.93 MICROGRAM(S)/KG/MIN: at 04:38

## 2023-08-30 RX ADMIN — Medication 50 MILLIGRAM(S): at 23:34

## 2023-08-30 RX ADMIN — MEROPENEM 100 MILLIGRAM(S): 1 INJECTION INTRAVENOUS at 10:47

## 2023-08-30 RX ADMIN — SODIUM CHLORIDE 1000 MILLILITER(S): 9 INJECTION, SOLUTION INTRAVENOUS at 03:18

## 2023-08-30 RX ADMIN — Medication 480 MICROGRAM(S): at 13:13

## 2023-08-30 RX ADMIN — Medication 8.93 MICROGRAM(S)/KG/MIN: at 03:50

## 2023-08-30 RX ADMIN — HYDROMORPHONE HYDROCHLORIDE 0.5 MILLIGRAM(S): 2 INJECTION INTRAMUSCULAR; INTRAVENOUS; SUBCUTANEOUS at 11:43

## 2023-08-30 RX ADMIN — CEFEPIME 100 MILLIGRAM(S): 1 INJECTION, POWDER, FOR SOLUTION INTRAMUSCULAR; INTRAVENOUS at 05:51

## 2023-08-30 RX ADMIN — Medication 200 MILLIGRAM(S): at 02:25

## 2023-08-30 RX ADMIN — Medication 68.7 MICROGRAM(S)/KG/MIN: at 20:43

## 2023-08-30 RX ADMIN — VASOPRESSIN 6 UNIT(S)/MIN: 20 INJECTION INTRAVENOUS at 21:19

## 2023-08-30 RX ADMIN — HYDROMORPHONE HYDROCHLORIDE 0.5 MILLIGRAM(S): 2 INJECTION INTRAMUSCULAR; INTRAVENOUS; SUBCUTANEOUS at 14:04

## 2023-08-30 RX ADMIN — Medication 50 MILLIGRAM(S): at 12:15

## 2023-08-30 RX ADMIN — Medication 400 MILLIGRAM(S): at 01:02

## 2023-08-30 RX ADMIN — SODIUM CHLORIDE 1000 MILLILITER(S): 9 INJECTION, SOLUTION INTRAVENOUS at 05:00

## 2023-08-30 RX ADMIN — HYDROMORPHONE HYDROCHLORIDE 0.5 MILLIGRAM(S): 2 INJECTION INTRAMUSCULAR; INTRAVENOUS; SUBCUTANEOUS at 10:45

## 2023-08-30 RX ADMIN — MEROPENEM 100 MILLIGRAM(S): 1 INJECTION INTRAVENOUS at 17:07

## 2023-08-30 RX ADMIN — CHLORHEXIDINE GLUCONATE 1 APPLICATION(S): 213 SOLUTION TOPICAL at 12:17

## 2023-08-30 RX ADMIN — VASOPRESSIN 6 UNIT(S)/MIN: 20 INJECTION INTRAVENOUS at 09:12

## 2023-08-30 NOTE — CONSULT NOTE ADULT - SUBJECTIVE AND OBJECTIVE BOX
NEPHROLOGY MEDICAL CARE, [facility] - Dr. Shukri Fair/ Dr. Caterina Gibbons/ Dr. Papa Conley/ Dr. Lissa Rene    Date of Service: 08-30-23 @ 16:09    Patient was seen and examined at bedside.     Consultation requested by:  Dr. Leal    Reason for Consult: JOSE ANGEL    HPI:  This is a 64 yo M with pmhx of prostate CA with mets to liver and bone, on Cabazitaxel q3 weeks, presents to the ED for generalized weakness and pain. Per wife, who is at bedside, states patient had an episode of loss of consciousness and fall, with head trauma. Patient also mentions having dysuria since yesterday. patient started this regimen in march, and this is the first time he has had such symptoms. He follows up with Oncologist Dr. Nasir Gondal. He recieved his last chemo treatment 1 week ago.  Family at bedside. Pt was admitted with Scr around 3.5mg/dl and worsening to 4.2mg/dL with decreased UO. Patient never had kidney disease in the past and last known Scr around 1.0mg/dL in sept 2022. Patient was given 2L RL in the Er and placed on two pressors for septic shock in the ICU. Patient also states he had red color urine yesterday but today it cleared up. denies taking any NSAIDs at home.       PMH:   CA of prostate    Enlarged prostate with lower urinary tract symptoms (LUTS)        PSH:   none        FAMILY HISTORY:  Family history of hypertension (Mother)        Social History:  non-smoker/ non-alcoholic     Home Meds:  Home Medications:  naproxen 500 mg oral tablet: 1 tab(s) orally 2 times a day, As Needed (30 Aug 2023 01:45)  Percocet 10 mg-325 mg oral tablet: 1 orally every 6 hours as needed for  severe pain (30 Aug 2023 01:44)  predniSONE 5 mg oral tablet: 1 orally 2 times a day (30 Aug 2023 01:44)  prochlorperazine 10 mg oral tablet: 1 orally every 6 hours as needed for  nausea (30 Aug 2023 01:44)  tamsulosin 0.4 mg oral capsule: 1 cap(s) orally once a day (at bedtime) (30 Aug 2023 01:45)      Allergies:  Allergies    No Known Allergies    Intolerances        REVIEW OF SYSTEMS:  CONSTITUTIONAL: No fever; No weight loss; fatigue  EYES: No eye pain; No visual disturbances; No discharge  ENMT:  No difficulty hearing; No tinnitus;  No vertigo; No sinus; No throat pain  NECK: No pain; No stiffness  BREASTS: No pain; No masses; No nipple discharge  RESPIRATORY: No cough; No wheezing; No chills; No hemoptysis; No shortness of breath  CARDIOVASCULAR: No chest pain; No palpitations; No dizziness; No leg swelling  GASTROINTESTINAL: No abdominal pain; No epigastric pain; No nausea; No vomiting; No hematemesis; No diarrhea; No constipation. No melena   GENITOURINARY: No dysuria No frequency; No hematuria; No incontinence  NEUROLOGICAL: No headaches; No memory loss; No loss of strength; No numbness; No tremors  SKIN: No itching; No burning; No rashes  ENDOCRINE: No heat or cold intolerance; No hair loss  MUSCULOSKELETAL: No joint pain or swelling; No muscle, back, or extremity pain  PSYCHIATRIC: No depression; No anxiety; No mood swings; No difficulty sleeping  HEME/LYMPH: No easy bruising; No bleeding gums  ALLERY AND IMMUNOLOGIC: No hives or eczema    Vital Signs Last 24 Hrs  T(C): 35.7 (30 Aug 2023 12:00), Max: 37.8 (29 Aug 2023 23:02)  T(F): 96.2 (30 Aug 2023 12:00), Max: 100 (29 Aug 2023 23:02)  HR: 111 (30 Aug 2023 16:00) (111 - 165)  BP: 86/70 (30 Aug 2023 08:15) (56/34 - 169/71)  BP(mean): 76 (30 Aug 2023 08:15) (42 - 79)  RR: 20 (30 Aug 2023 16:00) (17 - 31)  SpO2: 99% (30 Aug 2023 16:00) (95% - 100%)    Parameters below as of 30 Aug 2023 02:25  Patient On (Oxygen Delivery Method): BiPAP/CPAP        08-29 @ 07:01  -  08-30 @ 07:00  --------------------------------------------------------  IN: 1991.7 mL / OUT: 0 mL / NET: 1991.7 mL    08-30 @ 07:01  -  08-30 @ 16:09  --------------------------------------------------------  IN: 1339.6 mL / OUT: 115 mL / NET: 1224.6 mL          PHYSICAL EXAM:  General: No acute respiratory distress.  Eyes: conjunctiva and sclera clear  ENMT: Atraumatic, Normocephalic, supple, No JVD present. Moist mucous membranes  Respiratory: Bilateral poor air entry; No rales, rhonchi, wheezing  Cardiovascular: S1S2+; no m/r/g  Gastrointestinal: Soft, Non-tender, Nondistended; Bowel sounds present  : koehler's cath with minimal UO and yellow urine.  Neuro:  Awake, Alert & Oriented X3, No focal deficits present.   Ext:  2+ Peripheral Pulses and pedal edema, No Cyanosis  Skin: No visible rashes        LABS:                        8.5    0.11  )-----------( 19       ( 30 Aug 2023 14:21 )             25.8     08-30    128<L>  |  100  |  52<H>  ----------------------------<  181<H>  4.1   |  13<L>  |  4.29<H>    Ca    6.6<L>      30 Aug 2023 14:21  Phos  5.5     08-30  Mg     2.3     08-30    TPro  5.9<L>  /  Alb  1.8<L>  /  TBili  1.1  /  DBili  x   /  AST  19  /  ALT  17  /  AlkPhos  111  08-30    PT/INR - ( 30 Aug 2023 14:21 )   PT: 15.3 sec;   INR: 1.35 ratio         PTT - ( 30 Aug 2023 14:21 )  PTT:31.0 sec  Urinalysis Basic - ( 30 Aug 2023 14:21 )    Color: x / Appearance: x / SG: x / pH: x  Gluc: 181 mg/dL / Ketone: x  / Bili: x / Urobili: x   Blood: x / Protein: x / Nitrite: x   Leuk Esterase: x / RBC: x / WBC x   Sq Epi: x / Non Sq Epi: x / Bacteria: x      Magnesium: 2.3 mg/dL (08-30 @ 14:21)  Phosphorus: 5.5 mg/dL *H* (08-30 @ 14:21)  Phosphorus: 2.2 mg/dL *L* (08-30 @ 03:41)  Magnesium: 1.4 mg/dL *L* (08-30 @ 03:41)    Urine studies  Sodium, Random Urine: 104 mmol/L (08-30 @ 12:55)  Osmolality, Random Urine: 294 mos/kg (08-30 @ 12:55)  Creatinine, Random Urine: 36 mg/dL (08-30 @ 12:55)      Medications:  MEDICATIONS  (STANDING):  chlorhexidine 2% Cloths 1 Application(s) Topical daily  filgrastim-sndz (ZARXIO) Injectable 480 MICROGram(s) SubCutaneous daily  hydrocortisone sodium succinate Injectable 50 milliGRAM(s) IV Push every 6 hours  meropenem  IVPB      meropenem  IVPB 500 milliGRAM(s) IV Intermittent every 12 hours  norepinephrine Infusion 0.77 MICROgram(s)/kG/Min (68.7 mL/Hr) IV Continuous <Continuous>  vasopressin Infusion 0.04 Unit(s)/Min (6 mL/Hr) IV Continuous <Continuous>    MEDICATIONS  (PRN):  HYDROmorphone  Injectable 0.5 milliGRAM(s) IV Push every 4 hours PRN Severe Pain (7 - 10)  lidocaine 2% Jelly 5 milliLiter(s) IntraUrethral every 12 hours PRN Pain at koehler site

## 2023-08-30 NOTE — PROCEDURE NOTE - NSNUMOFLUMENS_VASC_A_CORE
triple lumen Scribe Attestation (For Scribes USE Only)... Attending Attestation (For Attendings USE Only).../Scribe Attestation (For Scribes USE Only)...

## 2023-08-30 NOTE — H&P ADULT - ASSESSMENT
DX:  1.   2.   3.   4.  5.  6.  7.  8.  9.    =================== Neuro============================  Alert and oriented x 3 at base line       ================= Cardiovascular==========================    ================- Pulm=================================      ==================ID===================================      ================= Nephro================================      =================GI====================================      ================ Heme==================================      =================Endocrine===============================      ================= Skin/Catheters============================      =================Prophylaxis =============================      ==================GOC==================================  FULL CODE        DX:  1. Septic Shock  2. Neutropenic fever  3. JOSE ANGEL  4. Lactic acidosis  5. Tachypnea  6. Sinus tachycardia    =================== Neuro============================  Alert and oriented x 3 at base line       ================= Cardiovascular==========================  #Sinus Tachycardia    ================- Pulm=================================  #Tachypnea  Likely 2/2 lactic acidosis  c/w bipap for now  ==================ID===================================  #Neutropenic fever    ================= Nephro================================  #JOSE ANGEL    =================GI====================================  #Diarrhea  had 3 episodes of diarrhea in ED  Likely 2/2 chemotherapy use  F/u Stool cx, gi pcr, stool O&P    ================ Heme==================================  #Neutropenia  As above  =================Endocrine===============================  No issues    ================= Skin/Catheters============================  Peripheral IV's  Parisi catheter    =================Prophylaxis =============================  SCD's  Will hold off chemical ppx due to thrombocytopenia    ==================GOC==================================  FULL CODE      This is a 64 yo M with pmhx of prostate CA with mets to liver and bone, on Cabazitaxel q3 weeks, presents to the ED for generalized weakness and pain admitted to ICU for septic shock 2/2 neutropenic fever.    DX:  1. Septic Shock  2. Neutropenic fever  3. JOSE ANGEL  4. Lactic acidosis  5. Tachypnea  6. Sinus tachycardia    =================== Neuro============================  Alert and oriented x 3 at base line       ================= Cardiovascular==========================  #Sinus Tachycardia  Likely in setting of fever and dehydration  ================- Pulm=================================  #Tachypnea  Likely 2/2 lactic acidosis  c/w bipap for now  ==================ID===================================  #Neutropenic fever    ================= Nephro================================  #JOSE ANGEL  Cr 3.55    #Lactic Acidosis  Lactate 4.4  In setting of hypoperfusion  s/p 2L LR in ED  c/w LR 100cc/hr  Trend until resolution  =================GI====================================  #Diarrhea  had 3 episodes of diarrhea in ED  Likely 2/2 chemotherapy use  F/u Stool cx, gi pcr, stool O&P    ================ Heme==================================  #Neutropenia  As above  =================Endocrine===============================  No issues    ================= Skin/Catheters============================  Peripheral IV's  Parisi catheter    =================Prophylaxis =============================  SCD's  Will hold off chemical ppx due to thrombocytopenia    ==================GOC==================================  FULL CODE      This is a 64 yo M with pmhx of prostate CA with mets to liver and bone, on Cabazitaxel q3 weeks, presents to the ED for generalized weakness and pain admitted to ICU for septic shock 2/2 neutropenic fever.    DX:  1. Septic Shock  2. Neutropenic fever  3. JOSE ANGEL  4. Lactic acidosis  5. Tachypnea  6. Sinus tachycardia    =================== Neuro============================  Alert and oriented x 3 at base line       ================= Cardiovascular==========================  #Sinus Tachycardia  Likely in setting of fever and dehydration  Start LR 100cc/hr  ================- Pulm=================================  #Tachypnea  Likely 2/2 lactic acidosis  c/w bipap for now  ==================ID===================================  #Neutropenic fever  With ANC    Heme/Onc consult in AM for possible G-CSF  ================= Nephro================================  #JOSE ANGEL  Cr 3.55    #Lactic Acidosis  Lactate 4.4  In setting of hypoperfusion  s/p 2L LR in ED  c/w LR 100cc/hr  Trend until resolution  =================GI====================================  #Diarrhea  had 3 episodes of diarrhea in ED  Likely 2/2 chemotherapy use  F/u Stool cx, gi pcr, stool O&P    ================ Heme==================================  #Neutropenia  As above  =================Endocrine===============================  No issues    ================= Skin/Catheters============================  Peripheral IV's  Parisi catheter    =================Prophylaxis =============================  SCD's  Will hold off chemical ppx due to thrombocytopenia    ==================GOC==================================  FULL CODE      This is a 62 yo M with pmhx of prostate CA with mets to liver and bone, on Cabazitaxel q3 weeks, presents to the ED for generalized weakness and pain admitted to ICU for septic shock 2/2 neutropenic fever.    DX:  1. Septic Shock  2. Neutropenic fever  3. JOSE ANGEL  4. Lactic acidosis  5. Tachypnea  6. Sinus tachycardia    =================== Neuro============================  Alert and oriented x 3 at base line       ================= Cardiovascular==========================  #Sinus Tachycardia  Likely in setting of fever and dehydration  Start LR 100cc/hr  ================- Pulm=================================  #Tachypnea  Likely 2/2 lactic acidosis  c/w bipap for now  ==================ID===================================  #Neutropenic fever  With ANC of 25  Start on Cefepime renally dosed  Will give 1 dose of Vanc  Heme/Onc consult in AM for possible G-CSF  ================= Nephro================================  #JOSE ANGEL  Cr 3.55 - likely    #Lactic Acidosis  Lactate 4.4  In setting of hypoperfusion  s/p 2L LR in ED  c/w LR 100cc/hr  Trend until resolution  =================GI====================================  #Diarrhea  had 3 episodes of diarrhea in ED  Likely 2/2 chemotherapy use  F/u Stool cx, gi pcr, stool O&P    ================ Heme==================================  #Neutropenia  As above  =================Endocrine===============================  No issues    ================= Skin/Catheters============================  Peripheral IV's  Parisi catheter    =================Prophylaxis =============================  SCD's  Will hold off chemical ppx due to thrombocytopenia    ==================GOC==================================  FULL CODE      This is a 64 yo M with pmhx of prostate CA with mets to liver and bone, on Cabazitaxel q3 weeks, presents to the ED for generalized weakness and pain admitted to ICU for septic shock 2/2 neutropenic fever.    DX:  1. Septic Shock  2. Neutropenic fever  3. JOSE ANGEL  4. Lactic acidosis  5. Tachypnea  6. Sinus tachycardia    =================== Neuro============================  Alert and oriented x 3 at base line       ================= Cardiovascular==========================  #Sinus Tachycardia  Likely in setting of fever and dehydration  Start LR 100cc/hr  ================- Pulm=================================  #Tachypnea  Likely 2/2 lactic acidosis  c/w bipap for now  ==================ID===================================  #Septic Shock  Start on Cefepime  s/p 2L LR in ED  Will give additional 1L LR      #Neutropenic fever  With ANC of 25  Start on Cefepime renally dosed  Will give 1 dose of Vanc  Heme/Onc consult in AM for possible G-CSF  ================= Nephro================================  #JOSE ANGEL  Cr 3.55   CT A/P Mild left hydroureteronephrosis extending to the collapsed bladder      #Lactic Acidosis  Lactate 4.4  In setting of hypoperfusion  s/p 2L LR in ED  c/w LR 100cc/hr  Trend until resolution  =================GI====================================  #Diarrhea  had 3 episodes of diarrhea in ED  Likely 2/2 chemotherapy use  F/u Stool cx, gi pcr, stool O&P    ================ Heme==================================  #Neutropenia  As above  =================Endocrine===============================  No issues    ================= Skin/Catheters============================  Peripheral IV's  Parisi catheter    =================Prophylaxis =============================  SCD's  Will hold off chemical ppx due to thrombocytopenia    ==================GOC==================================  FULL CODE      This is a 64 yo M with pmhx of prostate CA with mets to liver and bone, on Cabazitaxel q3 weeks, presents to the ED for generalized weakness and pain admitted to ICU for septic shock 2/2 neutropenic fever.    DX:  1. Septic Shock  2. Neutropenic fever  3. JOSE ANGEL  4. Lactic acidosis  5. Tachypnea  6. Sinus tachycardia    =================== Neuro============================  Alert and oriented x 3 at base line       ================= Cardiovascular==========================  #Sinus Tachycardia  Likely in setting of fever and dehydration  Start LR 100cc/hr  ================- Pulm=================================  #Tachypnea  Likely 2/2 lactic acidosis  c/w bipap for now  ==================ID===================================  #Septic Shock  Start on Cefepime  s/p 2L LR in ED  Will give additional 1L LR  May require vasopressors      #Neutropenic fever  With ANC of 25  Start on Cefepime renally dosed  Will give 1 dose of Vanc  Heme/Onc consult in AM for possible G-CSF  ================= Nephro================================  #JOSE ANGEL  Cr 3.55   CT A/P Mild left hydroureteronephrosis extending to the collapsed bladder  f/u UA and Urine lytes    #Lactic Acidosis  Lactate 4.4  In setting of hypoperfusion  s/p 2L LR in ED  c/w LR 100cc/hr  Trend until resolution  =================GI====================================  #Diarrhea  had 3 episodes of diarrhea in ED  Likely 2/2 chemotherapy use  F/u Stool cx, gi pcr, stool O&P    ================ Heme==================================  #Neutropenia  As above  =================Endocrine===============================  No issues    ================= Skin/Catheters============================  Peripheral IV's  Parisi catheter    =================Prophylaxis =============================  SCD's  Will hold off chemical ppx due to thrombocytopenia    ==================GOC==================================  FULL CODE      This is a 62 yo M with pmhx of prostate CA with mets to liver and bone, on Cabazitaxel q3 weeks, presents to the ED for generalized weakness and pain admitted to ICU for septic shock 2/2 neutropenic fever.    DX:  1. Septic Shock  2. Neutropenic fever  3. JOSE ANGEL  4. Lactic acidosis  5. Tachypnea  6. Sinus tachycardia    =================== Neuro============================  Alert and oriented x 3 at base line       ================= Cardiovascular==========================  #Sinus Tachycardia  Likely in setting of fever and dehydration  Start LR 100cc/hr  ================- Pulm=================================  #Tachypnea  Likely 2/2 lactic acidosis  c/w bipap for now  ==================ID===================================  #Septic Shock  Start on Cefepime  s/p 2L LR in ED  Will give additional 1L LR  Because patient w/ chronic steroid use, will give hydrocortisone 200 x1, and 50 q6hrs  May require vasopressors      #Neutropenic fever  With ANC of 25  Start on Cefepime renally dosed  Will give 1 dose of Vanc  Heme/Onc consult in AM for possible G-CSF  ================= Nephro================================  #JOSE ANGEL  Cr 3.55   CT A/P Mild left hydroureteronephrosis extending to the collapsed bladder  f/u UA and Urine lytes    #Lactic Acidosis  Lactate 4.4  In setting of hypoperfusion  s/p 2L LR in ED  Will give additional 1L LR  c/w LR 100cc/hr  Trend until resolution  =================GI====================================  #Diarrhea  had 3 episodes of diarrhea in ED  Likely 2/2 chemotherapy use  F/u Stool cx, gi pcr, stool O&P    ================ Heme==================================  #Neutropenia  As above  =================Endocrine===============================  No issues    ================= Skin/Catheters============================  Peripheral IV's  Parisi catheter    =================Prophylaxis =============================  SCD's  Will hold off chemical ppx due to thrombocytopenia    ==================GOC==================================  FULL CODE

## 2023-08-30 NOTE — PATIENT PROFILE ADULT - STATED REASON FOR ADMISSION
"Please see \"Imaging\" tab under \"Chart Review\" for details of today's US at the [facility].    Roderick Diaz MD  Maternal-Fetal Medicine      " Weakness

## 2023-08-30 NOTE — H&P ADULT - ATTENDING COMMENTS
63 yr old M with metastatic Prostate Ca on Chemo presents with generalized weakness and body pains found to have multiple metabolic derangements, also syncopal episode with negative CTH and CT cspine     Assessment   Septic shock likely 2/2 UTI   Neutropenic sepsis   JOSE ANGEL   Lactic acidosis    Acute hypoxic resp failure requiring NIPPV    Adrenal insufficiency    Plan   Fluid resuscitation with frequent volume reassessments to avoid volume overload   Start vasopressors if shock refractory to IVF   Trend lactate and Troponin   2D Echo   Oncology evaluation   Neupogen for neutropenic sepsis    Strict I/O   Trend electrolytes q6 and correct abnormalities   ABG and adjust BIPAP settings as needed     Broad spectrum empiric abx renally adjusted Cefepime to cover for Pseudomona Klebsiella Ecoli etc    Start stress dose steroids given ling term Prednisone use   SCDs for DVT ppx

## 2023-08-30 NOTE — CONSULT NOTE ADULT - SUBJECTIVE AND OBJECTIVE BOX
HPI:  This is a 62 yo M with pmhx of prostate CA with mets to liver and bone, on Cabazitaxel q3 weeks, presents to the ED for generalized weakness and pain. Per wife, who is at bedside, states patient had an episode of loss of consciousness and fall, with head trauma. Patient also mentions having dysuria since yesterday. patient started this regimen in march, and this is the first time he has had such symptoms. He follows up with Oncologist Dr. Nasir Gondal. He recieved his last chemo treatment 1 week ago.  (30 Aug 2023 01:34)      PAST MEDICAL & SURGICAL HISTORY:  CA of prostate      Enlarged prostate with lower urinary tract symptoms (LUTS)      No significant past surgical history          No Known Allergies      Meds:  chlorhexidine 2% Cloths 1 Application(s) Topical daily  filgrastim-sndz (ZARXIO) Injectable 480 MICROGram(s) SubCutaneous daily  hydrocortisone sodium succinate Injectable 50 milliGRAM(s) IV Push every 6 hours  HYDROmorphone  Injectable 0.5 milliGRAM(s) IV Push every 4 hours PRN  lidocaine 2% Jelly 5 milliLiter(s) IntraUrethral every 12 hours PRN  meropenem  IVPB      meropenem  IVPB 500 milliGRAM(s) IV Intermittent every 12 hours  norepinephrine Infusion 0.77 MICROgram(s)/kG/Min IV Continuous <Continuous>  vasopressin Infusion 0.04 Unit(s)/Min IV Continuous <Continuous>      SOCIAL HISTORY:  Smoker:  YES / NO        PACK YEARS:                         WHEN QUIT?  ETOH use:  YES / NO               FREQUENCY / QUANTITY:  [employer] Drug use:  YES / NO  Occupation:  Assisted device use (Cane / Walker):  Live with:    FAMILY HISTORY:  Family history of hypertension (Mother)        VITALS:  Vital Signs Last 24 Hrs  T(C): 35.7 (30 Aug 2023 18:00), Max: 37.8 (29 Aug 2023 23:02)  T(F): 96.2 (30 Aug 2023 18:00), Max: 100 (29 Aug 2023 23:02)  HR: 112 (30 Aug 2023 18:30) (111 - 165)  BP: 86/70 (30 Aug 2023 08:15) (56/34 - 169/71)  BP(mean): 76 (30 Aug 2023 08:15) (42 - 79)  RR: 24 (30 Aug 2023 18:30) (17 - 31)  SpO2: 100% (30 Aug 2023 18:30) (95% - 100%)    Parameters below as of 30 Aug 2023 02:25  Patient On (Oxygen Delivery Method): BiPAP/CPAP        LABS/DIAGNOSTIC TESTS:                          8.5    0.11  )-----------( 19       ( 30 Aug 2023 14:21 )             25.8     WBC Count: 0.11 K/uL (08-30 @ 14:21)  WBC Count: 0.05 K/uL (08-30 @ 03:41)  WBC Count: 0.10 K/uL (08-29 @ 17:44)      08-30    128<L>  |  100  |  52<H>  ----------------------------<  181<H>  4.1   |  13<L>  |  4.29<H>    Ca    6.6<L>      30 Aug 2023 14:21  Phos  5.5     08-30  Mg     2.3     08-30    TPro  5.9<L>  /  Alb  1.8<L>  /  TBili  1.1  /  DBili  x   /  AST  19  /  ALT  17  /  AlkPhos  111  08-30          LIVER FUNCTIONS - ( 30 Aug 2023 14:21 )  Alb: 1.8 g/dL / Pro: 5.9 g/dL / ALK PHOS: 111 U/L / ALT: 17 U/L DA / AST: 19 U/L / GGT: x             PT/INR - ( 30 Aug 2023 14:21 )   PT: 15.3 sec;   INR: 1.35 ratio         PTT - ( 30 Aug 2023 14:21 )  PTT:31.0 sec    LACTATE:Lactate, Blood: 5.2 mmol/L (08-30 @ 08:19)  Lactate, Blood: 6.3 mmol/L (08-30 @ 03:41)      ABG - ABG - ( 30 Aug 2023 14:32 )  pH, Arterial: 7.26  pH, Blood: x     /  pCO2: 29    /  pO2: 100   / HCO3: 13    / Base Excess: -12.6 /  SaO2: 100                 CULTURES:       RADIOLOGY:< from: Xray Chest 1 View- PORTABLE-Urgent (Xray Chest 1 View- PORTABLE-Urgent .) (08.30.23 @ 11:14) >  ACC: [id number] EXAM:  XR CHEST PORTABLE URGENT 1V   ORDERED BY: KAITLIN VASQUEZ     ACC: [id number] EXAM:  XR CHEST PORTABLE IMMED 1V   ORDERED BY: LEEANNE CENTENO     PROCEDURE DATE:  08/30/2023          INTERPRETATION:  AP erect chest on August30, 2020 3:34 AM. Patient has   anemia has Central line.    COMPARISON: None available.    Heart size magnified by technique.    There are small perihilar infiltrates left greater than right.    The right jugular line is been inserted in good position.    Follow-up AP chest on August 30, 2023 at 10:45 AM.    Slight increase in the perihilar infiltrates.    IMPRESSION: Perihilar infiltrate slightly increased on the latest film.   The infiltrates are greater on the left. Right jugular line in place.    --- End of Report ---            SAMRA COSME MD; Attending Radiologist  This document has been electronically signed. Aug 30 2023 11:45AM    < end of copied text >  -----------------------------------------------------------------------------------------------------------------------------------------------------------------------------------------------------  ACC: [id number] EXAM:  CT ABDOMEN AND PELVIS   ORDERED BY: DENNY BRITO     PROCEDURE DATE:  08/29/2023          INTERPRETATION:  CLINICAL INFORMATION: Status post fall. Flank pain.   Dysuria. Hematuria. History of metastatic prostate cancer.    COMPARISON: Noncontrast CT of the abdomen and pelvis in the 21st 2022.    CONTRAST/COMPLICATIONS:  IV Contrast: NONE  Oral Contrast: NONE  Complications: None reported at time of study completion    PROCEDURE:  CT of the Abdomen and Pelvis was performed.  Sagittal and coronal reformats were performed.    FINDINGS:  LOWER CHEST: Within normal limits.    LIVER: There has been marked interval increase in size and number of   hypoattenuating rounded lesions throughout the liver, compatible with   extensive metastatic burden.  BILE DUCTS: Normal caliber.  GALLBLADDER: Within normal limits.  SPLEEN: Within normal limits.  PANCREAS: Within normal limits.  ADRENALS: Within normal limits.  KIDNEYS/URETERS: There is mild left hydroureteronephrosis extending to   the bladder. No renal or ureteral stones are identified. There is trace   bilateral perinephric stranding.    BLADDER: Collapsed.  REPRODUCTIVE ORGANS: Prostate is shrunken.    BOWEL: No bowel obstruction. Appendix is normal.  PERITONEUM: No ascites.  VESSELS: Within normal limits.  RETROPERITONEUM/LYMPH NODES: No lymphadenopathy.  ABDOMINAL WALL: Within normal limits.  BONES: Again are noted extensive osteoblastic metastases of the   visualized skeleton.    IMPRESSION:  1. Extensive metastatic burden to the liver. Extensive blastic osseous   metastases.  2. Mild left hydroureteronephrosis extending to the collapsed bladder.    --- End of Report ---            TOSHIA STUART MD; Attending Radiologist    < end of copied text >        ROS  [  ] UNABLE TO ELICIT               HPI:  This is a 62 yo M with pmhx of prostate CA with mets to liver and bone, on Cabazitaxel q3 weeks, presents to the ED for generalized weakness and pain. Per wife, who is at bedside, states patient had an episode of loss of consciousness and fall, with head trauma. Patient also mentions having dysuria since yesterday. patient started this regimen in march, and this is the first time he has had such symptoms. He follows up with Oncologist Dr. Nasir Gondal. He recieved his last chemo treatment 1 week ago.  (30 Aug 2023 01:34)      History as above , asked to see this patient who presents from home with weakness, , chills , anorexia, x 6 days, he has diarrhea x 1 day and has dysuria , urinary frequency and urgency for the last few days, he has a slight cough also. He is currently admitted to the ICU with septic shock and is currently on 2 pressors, he was also found to have acute renal failure. He has some low grade temps here but is otherwise feeling a little better currently. He got his last Chemo treatment 1 week ago. He was also found to be Pancytopenic here. He was started on Meropenem and was given 1 dose of Vancomycin here.      PAST MEDICAL & SURGICAL HISTORY:  CA of prostate      Enlarged prostate with lower urinary tract symptoms (LUTS)      No significant past surgical history          No Known Allergies      Meds:  chlorhexidine 2% Cloths 1 Application(s) Topical daily  filgrastim-sndz (ZARXIO) Injectable 480 MICROGram(s) SubCutaneous daily  hydrocortisone sodium succinate Injectable 50 milliGRAM(s) IV Push every 6 hours  HYDROmorphone  Injectable 0.5 milliGRAM(s) IV Push every 4 hours PRN  lidocaine 2% Jelly 5 milliLiter(s) IntraUrethral every 12 hours PRN  meropenem  IVPB      meropenem  IVPB 500 milliGRAM(s) IV Intermittent every 12 hours  norepinephrine Infusion 0.77 MICROgram(s)/kG/Min IV Continuous <Continuous>  vasopressin Infusion 0.04 Unit(s)/Min IV Continuous <Continuous>      SOCIAL HISTORY:  Smoker:  no  ETOH use:  no      FAMILY HISTORY:  Family history of hypertension (Mother)        VITALS:  Vital Signs Last 24 Hrs  T(C): 35.7 (30 Aug 2023 18:00), Max: 37.8 (29 Aug 2023 23:02)  T(F): 96.2 (30 Aug 2023 18:00), Max: 100 (29 Aug 2023 23:02)  HR: 112 (30 Aug 2023 18:30) (111 - 165)  BP: 86/70 (30 Aug 2023 08:15) (56/34 - 169/71)  BP(mean): 76 (30 Aug 2023 08:15) (42 - 79)  RR: 24 (30 Aug 2023 18:30) (17 - 31)  SpO2: 100% (30 Aug 2023 18:30) (95% - 100%)    Parameters below as of 30 Aug 2023 02:25  Patient On (Oxygen Delivery Method): BiPAP/CPAP        LABS/DIAGNOSTIC TESTS:                          8.5    0.11  )-----------( 19       ( 30 Aug 2023 14:21 )             25.8     WBC Count: 0.11 K/uL (08-30 @ 14:21)  WBC Count: 0.05 K/uL (08-30 @ 03:41)  WBC Count: 0.10 K/uL (08-29 @ 17:44)      08-30    128<L>  |  100  |  52<H>  ----------------------------<  181<H>  4.1   |  13<L>  |  4.29<H>    Ca    6.6<L>      30 Aug 2023 14:21  Phos  5.5     08-30  Mg     2.3     08-30    TPro  5.9<L>  /  Alb  1.8<L>  /  TBili  1.1  /  DBili  x   /  AST  19  /  ALT  17  /  AlkPhos  111  08-30          LIVER FUNCTIONS - ( 30 Aug 2023 14:21 )  Alb: 1.8 g/dL / Pro: 5.9 g/dL / ALK PHOS: 111 U/L / ALT: 17 U/L DA / AST: 19 U/L / GGT: x             PT/INR - ( 30 Aug 2023 14:21 )   PT: 15.3 sec;   INR: 1.35 ratio         PTT - ( 30 Aug 2023 14:21 )  PTT:31.0 sec    LACTATE:Lactate, Blood: 5.2 mmol/L (08-30 @ 08:19)  Lactate, Blood: 6.3 mmol/L (08-30 @ 03:41)      ABG - ABG - ( 30 Aug 2023 14:32 )  pH, Arterial: 7.26  pH, Blood: x     /  pCO2: 29    /  pO2: 100   / HCO3: 13    / Base Excess: -12.6 /  SaO2: 100                 CULTURES:       RADIOLOGY:< from: Xray Chest 1 View- PORTABLE-Urgent (Xray Chest 1 View- PORTABLE-Urgent .) (08.30.23 @ 11:14) >  ACC: [id number] EXAM:  XR CHEST PORTABLE URGENT 1V   ORDERED BY: KAITLIN VASQUEZ     ACC: [id number] EXAM:  XR CHEST PORTABLE IMMED 1V   ORDERED BY: LEEANNE CENTENO     PROCEDURE DATE:  08/30/2023          INTERPRETATION:  AP erect chest on August30, 2020 3:34 AM. Patient has   anemia has Central line.    COMPARISON: None available.    Heart size magnified by technique.    There are small perihilar infiltrates left greater than right.    The right jugular line is been inserted in good position.    Follow-up AP chest on August 30, 2023 at 10:45 AM.    Slight increase in the perihilar infiltrates.    IMPRESSION: Perihilar infiltrate slightly increased on the latest film.   The infiltrates are greater on the left. Right jugular line in place.    --- End of Report ---            SAMRA COSME MD; Attending Radiologist  This document has been electronically signed. Aug 30 2023 11:45AM    < end of copied text >  -----------------------------------------------------------------------------------------------------------------------------------------------------------------------------------------------------  ACC: [id number] EXAM:  CT ABDOMEN AND PELVIS   ORDERED BY: DENNY BRITO     PROCEDURE DATE:  08/29/2023          INTERPRETATION:  CLINICAL INFORMATION: Status post fall. Flank pain.   Dysuria. Hematuria. History of metastatic prostate cancer.    COMPARISON: Noncontrast CT of the abdomen and pelvis in the 21st 2022.    CONTRAST/COMPLICATIONS:  IV Contrast: NONE  Oral Contrast: NONE  Complications: None reported at time of study completion    PROCEDURE:  CT of the Abdomen and Pelvis was performed.  Sagittal and coronal reformats were performed.    FINDINGS:  LOWER CHEST: Within normal limits.    LIVER: There has been marked interval increase in size and number of   hypoattenuating rounded lesions throughout the liver, compatible with   extensive metastatic burden.  BILE DUCTS: Normal caliber.  GALLBLADDER: Within normal limits.  SPLEEN: Within normal limits.  PANCREAS: Within normal limits.  ADRENALS: Within normal limits.  KIDNEYS/URETERS: There is mild left hydroureteronephrosis extending to   the bladder. No renal or ureteral stones are identified. There is trace   bilateral perinephric stranding.    BLADDER: Collapsed.  REPRODUCTIVE ORGANS: Prostate is shrunken.    BOWEL: No bowel obstruction. Appendix is normal.  PERITONEUM: No ascites.  VESSELS: Within normal limits.  RETROPERITONEUM/LYMPH NODES: No lymphadenopathy.  ABDOMINAL WALL: Within normal limits.  BONES: Again are noted extensive osteoblastic metastases of the   visualized skeleton.    IMPRESSION:  1. Extensive metastatic burden to the liver. Extensive blastic osseous   metastases.  2. Mild left hydroureteronephrosis extending to the collapsed bladder.    --- End of Report ---            TOSHIA STUART MD; Attending Radiologist    < end of copied text >        ROS  [  ] UNABLE TO ELICIT

## 2023-08-30 NOTE — H&P ADULT - HISTORY OF PRESENT ILLNESS
This is a 64 yo M with pmhx of prostate CA with mets to liver and bone, on Cabazitaxel q3 weeks, presents to the ED for generalized weakness and pain. Per wife, who is at bedside, states patient had an episode of loss of consciousness and fall, with head trauma. Patient also mentions having dysuria since yesterday. patient started this regimen in march, and this is the first time he has had such symptoms. He follows up with Oncologist Dr. Nasir Gondal. He recieved his last chemo treatment 1 week ago.

## 2023-08-30 NOTE — CONSULT NOTE ADULT - NECK
Will likely need IV antibiotics with ophthalmologist at [address]. Stressed the importance of patient going to the ER after leaving the office today. supple

## 2023-08-30 NOTE — H&P ADULT - NSHPPHYSICALEXAM_GEN_ALL_CORE
LOS:     VITALS:   T(C): 36.9 (08-30-23 @ 00:40), Max: 37.8 (08-29-23 @ 23:02)  HR: 140 (08-30-23 @ 00:40) (138 - 143)  BP: 88/58 (08-30-23 @ 00:40) (88/58 - 169/71)  RR: 24 (08-30-23 @ 00:40) (18 - 24)  SpO2: 96% (08-30-23 @ 00:40) (96% - 99%)    GENERAL: NAD, lying in bed comfortably  HEAD:  Atraumatic, Normocephalic  EYES: EOMI, PERRLA, conjunctiva and sclera clear  ENT: Moist mucous membranes  NECK: Supple, No JVD  CHEST/LUNG: Clear to auscultation bilaterally; No rales, rhonchi, wheezing, or rubs. Unlabored respirations  HEART: Regular rate and rhythm; No murmurs, rubs, or gallops  ABDOMEN: BSx4; Soft, nontender, nondistended  EXTREMITIES:  2+ Peripheral Pulses, brisk capillary refill. No clubbing, cyanosis, or edema  NERVOUS SYSTEM:  A&Ox3, no focal deficits   SKIN: No rashes or lesions LOS:     VITALS:   T(C): 36.9 (08-30-23 @ 00:40), Max: 37.8 (08-29-23 @ 23:02)  HR: 140 (08-30-23 @ 00:40) (138 - 143)  BP: 88/58 (08-30-23 @ 00:40) (88/58 - 169/71)  RR: 24 (08-30-23 @ 00:40) (18 - 24)  SpO2: 96% (08-30-23 @ 00:40) (96% - 99%)    GENERAL: In mild ditress  HEAD:  Atraumatic, Normocephalic  EYES: EOMI, PERRLA, conjunctiva and sclera clear  ENT: Moist mucous membranes  NECK: Supple, No JVD  CHEST/LUNG: +Tachypnea Clear to auscultation bilaterally; No rales, rhonchi, wheezing, or rubs  HEART: irregular rate, regular rhythm; No murmurs, rubs, or gallops  ABDOMEN: BSx4; Soft, nontender, nondistended  EXTREMITIES:  2+ Peripheral Pulses, brisk capillary refill. No clubbing, cyanosis, or edema  NERVOUS SYSTEM:  A&Ox3, no focal deficits   SKIN: No rashes or lesions LOS:     VITALS:   T(C): 36.9 (08-30-23 @ 00:40), Max: 37.8 (08-29-23 @ 23:02)  HR: 140 (08-30-23 @ 00:40) (138 - 143)  BP: 88/58 (08-30-23 @ 00:40) (88/58 - 169/71)  RR: 24 (08-30-23 @ 00:40) (18 - 24)  SpO2: 96% (08-30-23 @ 00:40) (96% - 99%)    GENERAL: In mild distress  HEAD:  Atraumatic, Normocephalic  EYES: EOMI, PERRLA, conjunctiva and sclera clear  ENT: Moist mucous membranes  NECK: Supple, No JVD  CHEST/LUNG: +Tachypnea Clear to auscultation bilaterally; No rales, rhonchi, wheezing, or rubs  HEART: irregular rate, regular rhythm; No murmurs, rubs, or gallops  ABDOMEN: BSx4; Soft, nontender, nondistended  EXTREMITIES:  2+ Peripheral Pulses, brisk capillary refill. No clubbing, cyanosis, or edema  NERVOUS SYSTEM:  A&Ox3, no focal deficits   SKIN: No rashes or lesions

## 2023-08-30 NOTE — H&P ADULT - CONVERSATION DETAILS
Discussed goals of care with patient and wife at bedside. Wife states that patient is a fighter and would want to be resuscitated and intubated. Patient is FULL CODE

## 2023-08-30 NOTE — PROGRESS NOTE ADULT - ASSESSMENT
This is a 64 yo M with pmhx of prostate CA with mets to liver and bone, on Cabazitaxel q3 weeks, presents to the ED for generalized weakness and pain admitted to ICU for septic shock 2/2 neutropenic fever.    DX:  1. Septic Shock  2. Neutropenic fever  3. JOSE ANGEL  4. Lactic acidosis  5. Tachypnea  6. Sinus tachycardia    =================== Neuro============================  Alert and oriented x 3 at base line       ================= Cardiovascular==========================  #Sinus Tachycardia  Likely in setting of fever and dehydration  Start LR 100cc/hr  ================- Pulm=================================  #Tachypnea  Likely 2/2 lactic acidosis  c/w bipap for now  ==================ID===================================  #Septic Shock  Start on Cefepime  s/p 2L LR in ED  Will give additional 1L LR  Because patient w/ chronic steroid use, will give hydrocortisone 200 x1, and 50 q6hrs  May require vasopressors      #Neutropenic fever  With ANC of 25  Start on Cefepime renally dosed  Will give 1 dose of Vanc  Heme/Onc consult in AM for possible G-CSF  ================= Nephro================================  #JOSE ANGEL  Cr 3.55   CT A/P Mild left hydroureteronephrosis extending to the collapsed bladder  f/u UA and Urine lytes    #Lactic Acidosis  Lactate 4.4  In setting of hypoperfusion  s/p 2L LR in ED  Will give additional 1L LR  c/w LR 100cc/hr  Trend until resolution  =================GI====================================  #Diarrhea  had 3 episodes of diarrhea in ED  Likely 2/2 chemotherapy use  F/u Stool cx, gi pcr, stool O&P    ================ Heme==================================  #Neutropenia  As above  =================Endocrine===============================  No issues    ================= Skin/Catheters============================  Peripheral IV's  Parisi catheter    =================Prophylaxis =============================  SCD's  Will hold off chemical ppx due to thrombocytopenia    ==================GOC==================================  FULL CODE      This is a 64 yo M with pmhx of prostate CA with mets to liver and bone, on Cabazitaxel q3 weeks, presents to the ED for generalized weakness and pain admitted to ICU for septic shock 2/2 neutropenic fever.    DX:  1. Septic Shock  2. Neutropenic fever  3. JOSE ANGEL  4. Lactic acidosis  5. Tachypnea  6. Sinus tachycardia    =================== Neuro============================  Alert and oriented x 3 at base line       ================= Cardiovascular==========================  #Sinus Tachycardia  Likely in setting of fever and dehydration  Start LR 100cc/hr  ================- Pulm=================================  #Tachypnea  Likely 2/2 lactic acidosis  c/w bipap for now  ==================ID===================================  #Septic Shock  meropenam day1/  s/p 3L LR and 2L NS (total 5L)  Because patient w/ chronic steroid use, will give hydrocortisone 200 x1, and 50 q6hrs  hypotension: levophed and vasopressin  f/u ucx     #Neutropenic fever  With ANC of 25  meropenam day1/  Will give 1 dose of Vanc  vanc trough ordered for monique (8/31)  Heme/Onc [facility] consulted (8/31)Dr. Casper will see him   ================= Nephro================================  #JOSE ANGEL  Cr 3.55   CT A/P Mild left hydroureteronephrosis extending to the collapsed bladder  f/u UA and Urine lytes    #Lactic Acidosis  Lactate 4.4  In setting of hypoperfusion  s/p 2L LR in ED  Will give additional 1L LR  c/w LR 100cc/hr  Trend until resolution  =================GI====================================  #Diarrhea  had 3 episodes of diarrhea in ED  Likely 2/2 chemotherapy use  F/u Stool cx, gi pcr, stool O&P    ================ Heme==================================  #Neutropenia  As above  =================Endocrine===============================  No issues    ================= Skin/Catheters============================  Peripheral IV's  Parisi catheter    =================Prophylaxis =============================  SCD's  Will hold off chemical ppx due to thrombocytopenia    ==================GOC==================================  FULL CODE      This is a 64 yo M with pmhx of prostate CA with mets to liver and bone, on Cabazitaxel q3 weeks, presents to the ED for generalized weakness and pain admitted to ICU for septic shock 2/2 neutropenic fever.    DX:  1. Septic Shock  2. Neutropenic fever  3. JOSE ANGEL  4. Lactic acidosis  5. Tachypnea  6. Sinus tachycardia    =================== Neuro============================  Alert and oriented x 3 at base line       ================= Cardiovascular==========================  #Sinus Tachycardia  Likely in setting of fever and dehydration  s/p LR 100cc/hr  ================- Pulm=================================  #Tachypnea  Likely 2/2 lactic acidosis  c/w bipap for now  ==================ID===================================  #Septic Shock  meropenam day1/  s/p 3L LR and 2L NS (total 5L)  Because patient w/ chronic steroid use, will give hydrocortisone 200 x1, and 50 q6hrs  hypotension: levophed and vasopressin  f/u ucx     #Neutropenic fever  With ANC of 25  meropenam day1/  Will give 1 dose of Vanc  vanc trough ordered for monique (8/31)  Heme/Onc [facility] consulted (8/31)Dr. Casper will see him   ================= Nephro================================  #JOSE ANGEL  Cr 3.55   CT A/P Mild left hydroureteronephrosis extending to the collapsed bladder  f/u UA and Urine lytes    #Lactic Acidosis  Lactate 4.4  In setting of hypoperfusion  s/p 2L LR in ED  Will give additional 1L LR  c/w LR 100cc/hr  Trend until resolution  =================GI====================================  #Diarrhea  had 3 episodes of diarrhea in ED  Likely 2/2 chemotherapy use  F/u Stool cx, gi pcr, stool O&P    ================ Heme==================================  #Neutropenia  As above  =================Endocrine===============================  No issues    ================= Skin/Catheters============================  Peripheral IV's  Parisi catheter    =================Prophylaxis =============================  SCD's  Will hold off chemical ppx due to thrombocytopenia    ==================GOC==================================  FULL CODE      This is a 62 yo M with pmhx of prostate CA with mets to liver and bone, on Cabazitaxel q3 weeks, presents to the ED for generalized weakness and pain admitted to ICU for septic shock 2/2 neutropenic fever.    DX:  1. Septic Shock  2. Neutropenic fever  3. JOSE ANGEL  4. Lactic acidosis  5. Tachypnea  6. Sinus tachycardia    =================== Neuro============================  Alert and oriented x 3 at base line       ================= Cardiovascular==========================  #Sinus Tachycardia  Likely in setting of fever and dehydration  s/p LR 100cc/hr  Troponin 156.7> f/u troponin  ================- Pulm=================================  #Tachypnea  Likely 2/2 lactic acidosis  c/w bipap for now  ==================ID===================================  #Septic Shock  meropenam day1/  s/p 3L LR and 2L NS (total 5L)  Because patient w/ chronic steroid use, will give hydrocortisone 200 x1, and 50 q6hrs  hypotension: levophed and vasopressin  f/u ucx     #Neutropenic fever  With ANC of 25  meropenam day1/  Will give 1 dose of Vanc  vanc trough ordered for monique (8/31)  Heme/Onc [facility] consulted (8/31)Dr. Casper   ================= Nephro================================  #JOSE ANGEL  Cr 3.55   CT A/P Mild left hydroureteronephrosis extending to the collapsed bladder  f/u UA and Urine lytes    #Lactic Acidosis  Lactate 4.4  In setting of hypoperfusion  s/p 5L (3LR 2NS)  Trend until resolution  =================GI====================================  #Diarrhea  had 3 episodes of diarrhea in ED  Likely 2/2 chemotherapy use  F/u Stool cx, gi pcr, stool O&P    ================ Heme==================================  #Neutropenia  As above  =================Endocrine===============================  No issues    ================= Skin/Catheters============================  Peripheral IV's  Arterial line (8/30)  Parisi catheter    =================Prophylaxis =============================  SCD's  Will hold off chemical ppx due to thrombocytopenia    ==================GOC==================================  FULL CODE      This is a 62 yo M with pmhx of prostate CA with mets to liver and bone, on Cabazitaxel q3 weeks, presents to the ED for generalized weakness and pain admitted to ICU for septic shock 2/2 neutropenic fever.    DX:  1. Septic Shock  2. Neutropenic fever  3. JOSE ANGEL  4. Lactic acidosis  5. Tachypnea  6. Sinus tachycardia  7. Schistocytes  8. Thrombocytopenia  =================== Neuro============================  Alert and oriented x 3 at base line       ================= Cardiovascular==========================  #Sinus Tachycardia  Likely in setting of fever and dehydration  s/p LR 100cc/hr  Troponin 156.7> f/u troponin  ================- Pulm=================================  #Tachypnea  Likely 2/2 lactic acidosis  c/w bipap for now  ==================ID===================================  #Septic Shock  meropenam day1/  s/p 3L LR and 2L NS (total 5L)  Because patient w/ chronic steroid use, will give hydrocortisone 200 x1, and 50 q6hrs  hypotension: levophed and vasopressin  f/u ucx     #Neutropenic fever  With ANC of 25  meropenam day1/  Will give 1 dose of Vanc  vanc trough ordered for monique (8/31)  Heme/Onc [facility] consulted (8/31)Dr. Casper   ================= Nephro================================  #JOSE ANGEL  Cr 3.55   CT A/P Mild left hydroureteronephrosis extending to the collapsed bladder  f/u UA and Urine lytes    #Lactic Acidosis  Lactate 4.4  In setting of hypoperfusion  s/p 5L (3LR 2NS)  Trend until resolution  =================GI====================================  #Diarrhea  had 3 episodes of diarrhea in ED  Likely 2/2 chemotherapy use  F/u Stool cx, gi pcr, stool O&P    ================ Heme==================================  #Neutropenia  As above    #Schistocytes. HUS vs TTP  thrombocytopenia (no active bleeding)  JOSE ANGEL     #Thrombocytopenia  (same as above)  =================Endocrine===============================  No issues    ================= Skin/Catheters============================  Peripheral IV's  Arterial line (8/30)  Parisi catheter    =================Prophylaxis =============================  SCD's  Will hold off chemical ppx due to thrombocytopenia    ==================GOC==================================  FULL CODE      This is a 62 yo M with pmhx of prostate CA with mets to liver and bone, on Cabazitaxel q3 weeks, presents to the ED for generalized weakness and pain admitted to ICU for septic shock 2/2 neutropenic fever.    DX:  1. Septic Shock  2. Neutropenic fever  3. JOSE ANGEL  4. Lactic acidosis  5. Tachypnea  6. Sinus tachycardia  7. Schistocytes  8. Thrombocytopenia  9. Elevated troponins  =================== Neuro============================  Alert and oriented x 3 at base line       ================= Cardiovascular==========================  #Sinus Tachycardia  Likely in setting of fever and dehydration  s/p LR 100cc/hr    # Cardiogenic Shock   Troponin 156.7> f/u troponin  hypotension: on levophed and vasopressin  f/u TTE  ================- Pulm=================================  #Tachypnea  Likely 2/2 lactic acidosis  c/w bipap for now  ==================ID===================================  #Septic Shock  meropenam day1/  s/p 3L LR and 2L NS (total 5L)  Because patient w/ chronic steroid use, will give hydrocortisone 200 x1, and 50 q6hrs  hypotension: levophed and vasopressin  f/u ucx     #Neutropenic fever  With ANC of 25  meropenam day1/  Will give 1 dose of Vanc  vanc trough ordered for monique (8/31)  Heme/Onc QMA consulted (8/31)Dr. Casper   ================= Nephro================================  #JOSE ANGEL  Cr 3.55   CT A/P Mild left hydroureteronephrosis extending to the collapsed bladder  f/u UA and Urine lytes    #Lactic Acidosis  Lactate 4.4  In setting of hypoperfusion  s/p 5L (3LR 2NS)  Trend until resolution  =================GI====================================  #Diarrhea  had 3 episodes of diarrhea in ED  Likely 2/2 chemotherapy use  F/u Stool cx, gi pcr, stool O&P    ================ Heme==================================  #Neutropenia  As above    #Schistocytes. HUS vs TTP  thrombocytopenia (no active bleeding)  JOSE ANGEL     #Thrombocytopenia  (same as above)  =================Endocrine===============================  No issues    ================= Skin/Catheters============================  Peripheral IV's  Arterial line (8/30)  Parisi catheter    =================Prophylaxis =============================  SCD's  Will hold off chemical ppx due to thrombocytopenia    ==================GOC==================================  FULL CODE      This is a 62 yo M with pmhx of prostate CA with mets to liver and bone, on Cabazitaxel q3 weeks, presents to the ED for generalized weakness and pain admitted to ICU for septic shock 2/2 neutropenic fever.    DX:  1. Septic Shock  2. Neutropenic fever  3. JOSE ANGEL  4. Lactic acidosis  5. Tachypnea  6. Sinus tachycardia  7. Schistocytes  8. Thrombocytopenia  9. Elevated troponins  =================== Neuro============================  Alert and oriented x 3 at base line       ================= Cardiovascular==========================  #Sinus Tachycardia  Likely in setting of fever and dehydration  s/p LR 100cc/hr    # Cardiogenic Shock   Troponin 156.7> f/u troponin  hypotension: on levophed and vasopressin  f/u TTE  ================- Pulm=================================  #Tachypnea  Likely 2/2 lactic acidosis  c/w bipap for now  ==================ID===================================  #Septic Shock  meropenam day1/  s/p 3L LR and 2L NS (total 5L)  Because patient w/ chronic steroid use, will give hydrocortisone 200 x1, and 50 q6hrs  hypotension: levophed and vasopressin  f/u ucx     #Neutropenic fever  With ANC of 25  meropenam day1/  Will give 1 dose of Vanc  vanc trough ordered for monique (8/31)  Heme/Onc QMA consulted (8/31)Dr. Casper   ================= Nephro================================  #JOSE ANGEL  Cr 3.55   CT A/P Mild left hydroureteronephrosis extending to the collapsed bladder  f/u UA and Urine lytes    #Lactic Acidosis  Lactate 4.4  In setting of hypoperfusion  s/p 5L (3LR 2NS)  Trend until resolution  =================GI====================================  #Diarrhea  had 3 episodes of diarrhea in ED  Likely 2/2 chemotherapy use  F/u Stool cx, gi pcr, stool O&P, shiga toxin    ================ Heme==================================  #Neutropenia  As above    #Schistocytes. HUS vs TTP  thrombocytopenia (no active bleeding)  JOSE ANGEL     #Thrombocytopenia  (same as above)  =================Endocrine===============================  No issues    ================= Skin/Catheters============================  Peripheral IV's  Arterial line (8/30)  Parisi catheter    =================Prophylaxis =============================  SCD's  Will hold off chemical ppx due to thrombocytopenia    ==================GOC==================================  FULL CODE      This is a 64 yo M with pmhx of prostate CA with mets to liver and bone, on Cabazitaxel q3 weeks, presents to the ED for generalized weakness and pain admitted to ICU for septic shock 2/2 neutropenic fever.    DX:  1. Septic Shock  2. Neutropenic fever  3. JOSE ANGEL  4. Lactic acidosis  5. Tachypnea  6. Sinus tachycardia  7. Schistocytes  8. Thrombocytopenia  9. Elevated troponins  =================== Neuro============================  Alert and oriented x 3 at base line       ================= Cardiovascular==========================  #Sinus Tachycardia  Likely in setting of fever and dehydration  s/p LR 100cc/hr    # Cardiogenic Shock   Troponin 156.7  trend trops  hypotension: on levophed and vasopressin  f/u TTE  ================- Pulm=================================  #Tachypnea  Likely 2/2 lactic acidosis  c/w bipap for now  ==================ID===================================  #Septic Shock  meropenam day1/  s/p 3L LR and 2L NS (total 5L)  Because patient w/ chronic steroid use, will give hydrocortisone 200 x1, and 50 q6hrs  hypotension: levophed and vasopressin  f/u ucx     #Neutropenic fever  With ANC of 25  meropenam day1/  Will give 1 dose of Vanc  vanc trough ordered for monique (8/31)  Heme/Onc QMA consulted (8/31)Dr. Casper   ================= Nephro================================  #JOSE ANGEL  Cr 3.55   CT A/P Mild left hydroureteronephrosis extending to the collapsed bladder  f/u UA and Urine lytes    #Lactic Acidosis  Lactate 4.4  In setting of hypoperfusion  s/p 5L (3LR 2NS)  Trend until resolution  =================GI====================================  #Diarrhea  had 3 episodes of diarrhea in ED  Likely 2/2 chemotherapy use  F/u Stool cx, gi pcr, stool O&P, shiga toxin    ================ Heme==================================  #Neutropenia  As above    #Schistocytes. HUS vs TTP  thrombocytopenia (no active bleeding)  JOSE ANGEL     #Thrombocytopenia  (same as above)  f/u ADAMTS 13  =================Endocrine===============================  No issues    ================= Skin/Catheters============================  Peripheral IV's  Arterial line (8/30)  Parisi catheter    =================Prophylaxis =============================  SCD's  Will hold off chemical ppx due to thrombocytopenia    ==================GOC==================================  FULL CODE      This is a 62 yo M with pmhx of prostate CA with mets to liver and bone, on Cabazitaxel q3 weeks, presents to the ED for generalized weakness and pain admitted to ICU for septic shock 2/2 neutropenic fever.    DX:  1. Septic Shock  2. Neutropenic fever  3. JOSE ANGEL  4. Lactic acidosis  5. Tachypnea  6. Sinus tachycardia  7. Schistocytes  8. Thrombocytopenia  9. Elevated troponins  =================== Neuro============================  Alert and oriented x 3 at base line       ================= Cardiovascular==========================  #Sinus Tachycardia  Likely in setting of fever and dehydration  s/p LR 100cc/hr    # Cardiogenic Shock   Troponin 156.7  trend trops  hypotension: on levophed and vasopressin  f/u TTE  ================- Pulm=================================  #Tachypnea  Likely 2/2 lactic acidosis  c/w bipap for now  ==================ID===================================  #Septic Shock  meropenam day1/  s/p 3L LR and 2L NS (total 5L)  Because patient w/ chronic steroid use, will give hydrocortisone 200 x1, and 50 q6hrs  hypotension: levophed and vasopressin  f/u ucx     #Neutropenic fever  With ANC of 25  meropenam day1/8  Will give 1 dose of Vanc  f/u vanc trough  Heme/Onc QMA consulted (8/31)Dr. Casper   ================= Nephro================================  #JOSE ANGEL  Cr 3.55   CT A/P Mild left hydroureteronephrosis extending to the collapsed bladder  f/u UA and Urine lytes    #Lactic Acidosis  Lactate 4.4  In setting of hypoperfusion  s/p 5L (3LR 2NS)  Trend until resolution  =================GI====================================  #Diarrhea  had 3 episodes of diarrhea in ED  Likely 2/2 chemotherapy use  F/u Stool cx, gi pcr, stool O&P, shiga toxin    ================ Heme==================================  #Neutropenia  As above    #Schistocytes. HUS vs TTP  thrombocytopenia (no active bleeding)  JOSE ANGEL     #Thrombocytopenia  (same as above)  f/u ADAMTS 13  =================Endocrine===============================  No issues    ================= Skin/Catheters============================  Peripheral IV's  Arterial line (8/30)  Parisi catheter    =================Prophylaxis =============================  SCD's  Will hold off chemical ppx due to thrombocytopenia    ==================GOC==================================  FULL CODE

## 2023-08-30 NOTE — PROGRESS NOTE ADULT - SUBJECTIVE AND OBJECTIVE BOX
Patient is a 63y old  Male who presents with a chief complaint of Septic Shock 2/2 neutropenic fever (30 Aug 2023 01:34)      HPI:  This is a 64 yo M with pmhx of prostate CA with mets to liver and bone, on Cabazitaxel q3 weeks, presents to the ED for generalized weakness and pain. Per wife, who is at bedside, states patient had an episode of loss of consciousness and fall, with head trauma. Patient also mentions having dysuria since yesterday. patient started this regimen in march, and this is the first time he has had such symptoms. He follows up with Oncologist Dr. Nasir Gondal. He recieved his last chemo treatment 1 week ago.  (30 Aug 2023 01:34)      Allergies    No Known Allergies    Intolerances        MEDICATIONS  (STANDING):  acetaminophen   IVPB .. 1000 milliGRAM(s) IV Intermittent once  cefepime   IVPB 2000 milliGRAM(s) IV Intermittent every 24 hours  chlorhexidine 4% Liquid 1 Application(s) Topical <User Schedule>  hydrocortisone sodium succinate Injectable 50 milliGRAM(s) IV Push every 6 hours  lactated ringers. 1000 milliLiter(s) (100 mL/Hr) IV Continuous <Continuous>  norepinephrine Infusion 0.05 MICROgram(s)/kG/Min (4.46 mL/Hr) IV Continuous <Continuous>  vancomycin  IVPB 1250 milliGRAM(s) IV Intermittent once  vasopressin Infusion 0.04 Unit(s)/Min (6 mL/Hr) IV Continuous <Continuous>    MEDICATIONS  (PRN):  sodium chloride 0.9% lock flush 10 milliLiter(s) IV Push every 1 hour PRN Pre/post blood products, medications, blood draw, and to maintain line patency      Daily Height in cm: 172.72 (30 Aug 2023 03:15)    Daily     Drug Dosing Weight  Height (cm): 172.7 (30 Aug 2023 03:15)  Weight (kg): 95.2 (30 Aug 2023 03:15)  BMI (kg/m2): 31.9 (30 Aug 2023 03:15)  BSA (m2): 2.09 (30 Aug 2023 03:15)    PAST MEDICAL & SURGICAL HISTORY:  CA of prostate      Enlarged prostate with lower urinary tract symptoms (LUTS)      No significant past surgical history          FAMILY HISTORY:  Family history of hypertension (Mother)        SOCIAL HISTORY:    ADVANCE DIRECTIVES:    REVIEW OF SYSTEMS:    CONSTITUTIONAL: No fever, weight loss, or fatigue  EYES: No eye pain, visual disturbances, or discharge  ENMT:  No difficulty hearing, tinnitus, vertigo; No sinus or throat pain  NECK: No pain or stiffness  BREASTS: No pain, masses, or nipple discharge  RESPIRATORY: No cough, wheezing, chills or hemoptysis; No shortness of breath  CARDIOVASCULAR: No chest pain, palpitations, dizziness, or leg swelling  GASTROINTESTINAL: No abdominal or epigastric pain. No nausea, vomiting, or hematemesis; No diarrhea or constipation. No melena or hematochezia.  GENITOURINARY: No dysuria, frequency, hematuria, or incontinence  NEUROLOGICAL: No headaches, memory loss, loss of strength, numbness, or tremors  SKIN: No itching, burning, rashes, or lesions   LYMPH NODES: No enlarged glands  ENDOCRINE: No heat or cold intolerance; No hair loss  MUSCULOSKELETAL: No joint pain or swelling; No muscle, back, or extremity pain  PSYCHIATRIC: No depression, anxiety, mood swings, or difficulty sleeping  HEME/LYMPH: No easy bruising, or bleeding gums  ALLERGY AND IMMUNOLOGIC: No hives or eczema          ICU Vital Signs Last 24 Hrs  T(C): 36.9 (30 Aug 2023 03:15), Max: 37.8 (29 Aug 2023 23:02)  T(F): 98.5 (30 Aug 2023 03:15), Max: 100 (29 Aug 2023 23:02)  HR: 123 (30 Aug 2023 08:15) (120 - 165)  BP: 86/70 (30 Aug 2023 08:15) (56/34 - 169/71)  BP(mean): 76 (30 Aug 2023 08:15) (42 - 79)  ABP: 97/57 (30 Aug 2023 08:15) (97/57 - 101/65)  ABP(mean): 69 (30 Aug 2023 08:15) (69 - 77)  RR: 28 (30 Aug 2023 08:15) (18 - 30)  SpO2: 100% (30 Aug 2023 08:15) (96% - 100%)    O2 Parameters below as of 30 Aug 2023 02:25  Patient On (Oxygen Delivery Method): BiPAP/CPAP                I&O's Detail    29 Aug 2023 07:01  -  30 Aug 2023 07:00  --------------------------------------------------------  IN:    Lactated Ringers: 999 mL    Norepinephrine: 803.4 mL    Norepinephrine: 89.3 mL  Total IN: 1891.7 mL    OUT:    Indwelling Catheter - Urethral (mL): 0 mL  Total OUT: 0 mL    Total NET: 1891.7 mL          PHYSICAL EXAM:    GENERAL: NAD, well-groomed, well-developed  HEAD:  Atraumatic, Normocephalic  EYES: EOMI, PERRLA, conjunctiva and sclera clear  ENMT: No tonsillar erythema, exudates, or enlargement; Moist mucous membranes, Good dentition, No lesions  NECK: Supple, No JVD, Normal thyroid  NERVOUS SYSTEM:  Alert & Oriented X3, Good concentration; Motor Strength 5/5 B/L upper and lower extremities; DTRs 2+ intact and symmetric  CHEST/LUNG: Clear to percussion bilaterally; No rales, rhonchi, wheezing, or rubs  HEART: Regular rate and rhythm; No murmurs, rubs, or gallops  ABDOMEN: Soft, Nontender, Nondistended; Bowel sounds present  EXTREMITIES:  2+ Peripheral Pulses, No clubbing, cyanosis, or edema  LYMPH: No lymphadenopathy noted  SKIN: No rashes or lesions    LABS:  CBC Full  -  ( 30 Aug 2023 03:41 )  WBC Count : 0.05 K/uL  RBC Count : 2.68 M/uL  Hemoglobin : 8.4 g/dL  Hematocrit : 26.0 %  Platelet Count - Automated : 36 K/uL  Mean Cell Volume : 97.0 fl  Mean Cell Hemoglobin : 31.3 pg  Mean Cell Hemoglobin Concentration : 32.3 gm/dL  Auto Neutrophil # : 0.01 K/uL  Auto Lymphocyte # : 0.04 K/uL  Auto Monocyte # : 0.00 K/uL  Auto Eosinophil # : 0.00 K/uL  Auto Basophil # : 0.00 K/uL  Auto Neutrophil % : 20.0 %  Auto Lymphocyte % : 80.0 %  Auto Monocyte % : 0.0 %  Auto Eosinophil % : 0.0 %  Auto Basophil % : 0.0 %    08-30    129<L>  |  98  |  47<H>  ----------------------------<  97  3.9   |  15<L>  |  4.09<H>    Ca    7.2<L>      30 Aug 2023 03:41  Phos  2.2     08-30  Mg     1.4     08-30    TPro  6.4  /  Alb  2.0<L>  /  TBili  1.1  /  DBili  x   /  AST  15  /  ALT  13  /  AlkPhos  130<H>  08-30    CAPILLARY BLOOD GLUCOSE      POCT Blood Glucose.: 150 mg/dL (30 Aug 2023 06:49)    PT/INR - ( 29 Aug 2023 17:44 )   PT: 12.5 sec;   INR: 1.10 ratio         PTT - ( 29 Aug 2023 17:44 )  PTT:31.1 sec  Urinalysis Basic - ( 30 Aug 2023 03:41 )    Color: x / Appearance: x / SG: x / pH: x  Gluc: 97 mg/dL / Ketone: x  / Bili: x / Urobili: x   Blood: x / Protein: x / Nitrite: x   Leuk Esterase: x / RBC: x / WBC x   Sq Epi: x / Non Sq Epi: x / Bacteria: x              EKG:    ECHO, US:    RADIOLOGY:    CRITICAL CARE TIME SPENT:

## 2023-08-30 NOTE — H&P ADULT - NSHPREVIEWOFSYSTEMS_GEN_ALL_CORE
REVIEW OF SYSTEMS:    CONSTITUTIONAL: + weakness, fevers and chills  EYES/ENT: No visual changes;  No vertigo or throat pain   NECK: No pain or stiffness  RESPIRATORY: +SOB No cough, wheezing, hemoptysis  CARDIOVASCULAR: No chest pain or palpitations  GASTROINTESTINAL: +Nausea, diarrhea No abdominal or epigastric pain. No vomiting, or hematemesis; No constipation. No melena or hematochezia.  GENITOURINARY: No dysuria, frequency or hematuria  NEUROLOGICAL: No numbness or weakness  SKIN: No itching, rashes REVIEW OF SYSTEMS:    CONSTITUTIONAL: + weakness, fevers and chills  EYES/ENT: No visual changes;  No vertigo or throat pain   NECK: No pain or stiffness  RESPIRATORY: +SOB No cough, wheezing, hemoptysis  CARDIOVASCULAR: No chest pain or palpitations  GASTROINTESTINAL: +Nausea and diarrhea No abdominal or epigastric pain. No vomiting, or hematemesis; No constipation. No melena or hematochezia.  GENITOURINARY: No dysuria, frequency or hematuria  NEUROLOGICAL: No numbness or weakness  SKIN: No itching, rashes REVIEW OF SYSTEMS:    CONSTITUTIONAL: + weakness, fevers and chills  EYES/ENT: No visual changes;  No vertigo or throat pain   NECK: No pain or stiffness  RESPIRATORY: +SOB No cough, wheezing, hemoptysis  CARDIOVASCULAR: No chest pain or palpitations  GASTROINTESTINAL: +Nausea and diarrhea No abdominal or epigastric pain. No vomiting, or hematemesis; No constipation. No melena or hematochezia.  GENITOURINARY: + dysuria and  hematuria  NEUROLOGICAL: No numbness or weakness  SKIN: No itching, rashes

## 2023-08-30 NOTE — PROGRESS NOTE ADULT - SUBJECTIVE AND OBJECTIVE BOX
INTERVAL HPI/OVERNIGHT EVENTS:       PRESSORS: [ ] YES [ ] NO  WHICH:    ANTIBIOTICS:                  DATE STARTED:  ANTIBIOTICS:                  DATE STARTED:    Antimicrobial:  meropenem  IVPB      meropenem  IVPB 500 milliGRAM(s) IV Intermittent every 12 hours    Cardiovascular:  norepinephrine Infusion 0.77 MICROgram(s)/kG/Min IV Continuous <Continuous>    Pulmonary:    Hematalogic:    Other:  chlorhexidine 2% Cloths 1 Application(s) Topical daily  filgrastim-sndz (ZARXIO) Injectable 480 MICROGram(s) SubCutaneous once  hydrocortisone sodium succinate Injectable 50 milliGRAM(s) IV Push every 6 hours  HYDROmorphone  Injectable 0.5 milliGRAM(s) IV Push every 4 hours PRN  lidocaine 2% Jelly 5 milliLiter(s) IntraUrethral every 12 hours PRN  vasopressin Infusion 0.04 Unit(s)/Min IV Continuous <Continuous>    chlorhexidine 2% Cloths 1 Application(s) Topical daily  filgrastim-sndz (ZARXIO) Injectable 480 MICROGram(s) SubCutaneous once  hydrocortisone sodium succinate Injectable 50 milliGRAM(s) IV Push every 6 hours  HYDROmorphone  Injectable 0.5 milliGRAM(s) IV Push every 4 hours PRN  lidocaine 2% Jelly 5 milliLiter(s) IntraUrethral every 12 hours PRN  meropenem  IVPB      meropenem  IVPB 500 milliGRAM(s) IV Intermittent every 12 hours  norepinephrine Infusion 0.77 MICROgram(s)/kG/Min IV Continuous <Continuous>  vasopressin Infusion 0.04 Unit(s)/Min IV Continuous <Continuous>    Drug Dosing Weight  Height (cm): 172.7 (30 Aug 2023 03:15)  Weight (kg): 95.2 (30 Aug 2023 03:15)  BMI (kg/m2): 31.9 (30 Aug 2023 03:15)  BSA (m2): 2.09 (30 Aug 2023 03:15)    PHYSICAL EXAM:  GENERAL: NAD  EYES: EOMI, PERRLA  NECK: Supple, No JVD; Normal thyroid; Trachea midline: No LAD   NERVOUS SYSTEM:  Alert & Oriented X3,  Motor Strength 5/5 B/L upper and lower extremities; DTRs 2+ intact and symmetric  CHEST/LUNG: No rales, rhonchi, wheezing, breath sounds present bilaterally  HEART: Regular rate and rhythm; No murmurs, no gallops  ABDOMEN: Soft, Nontender, Nondistended; Bowel sounds present, no pain or masses on palpation  : voiding well, Putnam in place  EXTREMITIES:  2+ Peripheral Pulses, No clubbing, cyanosis, or edema  SKIN: warm, intact, no lesions     LINES/DRAINS/DEVICES  CENTRAL LINE: [ ] YES [ ] NO  LOCATION:     PUTNAM: [ ] YES [ ] NO     A-LINE:  [ ] YES [ ] NO  LOCATION:       ICU Vital Signs Last 24 Hrs  T(C): 35.6 (30 Aug 2023 08:00), Max: 37.8 (29 Aug 2023 23:02)  T(F): 96 (30 Aug 2023 08:00), Max: 100 (29 Aug 2023 23:02)  HR: 121 (30 Aug 2023 11:00) (119 - 165)  BP: 86/70 (30 Aug 2023 08:15) (56/34 - 169/71)  BP(mean): 76 (30 Aug 2023 08:15) (42 - 79)  ABP: 99/59 (30 Aug 2023 11:00) (89/57 - 112/69)  ABP(mean): 71 (30 Aug 2023 11:00) (68 - 83)  RR: 21 (30 Aug 2023 11:00) (18 - 31)  SpO2: 98% (30 Aug 2023 11:00) (96% - 100%)    O2 Parameters below as of 30 Aug 2023 02:25  Patient On (Oxygen Delivery Method): BiPAP/CPAP            ABG - ( 30 Aug 2023 09:29 )  pH, Arterial: 7.32  pH, Blood: x     /  pCO2: 22    /  pO2: 131   / HCO3: 11    / Base Excess: -12.7 /  SaO2: 100                   08-29 @ 07:01  -  08-30 @ 07:00  --------------------------------------------------------  IN: 1991.7 mL / OUT: 0 mL / NET: 1991.7 mL              LABS:  CBC Full  -  ( 30 Aug 2023 03:41 )  WBC Count : 0.05 K/uL  RBC Count : 2.68 M/uL  Hemoglobin : 8.4 g/dL  Hematocrit : 26.0 %  Platelet Count - Automated : 36 K/uL  Mean Cell Volume : 97.0 fl  Mean Cell Hemoglobin : 31.3 pg  Mean Cell Hemoglobin Concentration : 32.3 gm/dL  Auto Neutrophil # : 0.01 K/uL  Auto Lymphocyte # : 0.04 K/uL  Auto Monocyte # : 0.00 K/uL  Auto Eosinophil # : 0.00 K/uL  Auto Basophil # : 0.00 K/uL  Auto Neutrophil % : 20.0 %  Auto Lymphocyte % : 80.0 %  Auto Monocyte % : 0.0 %  Auto Eosinophil % : 0.0 %  Auto Basophil % : 0.0 %    08-30    129<L>  |  100  |  49<H>  ----------------------------<  160<H>  3.9   |  13<L>  |  4.19<H>    Ca    6.6<L>      30 Aug 2023 08:32  Phos  2.2     08-30  Mg     1.4     08-30    TPro  6.4  /  Alb  2.0<L>  /  TBili  1.1  /  DBili  x   /  AST  15  /  ALT  13  /  AlkPhos  130<H>  08-30    PT/INR - ( 29 Aug 2023 17:44 )   PT: 12.5 sec;   INR: 1.10 ratio         PTT - ( 29 Aug 2023 17:44 )  PTT:31.1 sec  Urinalysis Basic - ( 30 Aug 2023 08:32 )    Color: x / Appearance: x / SG: x / pH: x  Gluc: 160 mg/dL / Ketone: x  / Bili: x / Urobili: x   Blood: x / Protein: x / Nitrite: x   Leuk Esterase: x / RBC: x / WBC x   Sq Epi: x / Non Sq Epi: x / Bacteria: x          RADIOLOGY & ADDITIONAL STUDIES REVIEWED DURING TEAM ROUNDS    [ ]GOALS OF CARE DISCUSSION WITH PATIENT/FAMILY/PROXY:    CRITICAL CARE TIME SPENT: 35 minutes   INTERVAL HPI/OVERNIGHT EVENTS: Overnight he had 3 episodes of diarrhea, foul smelling, nonbloody. An arterial line was placed in Left forearm on 8/30. Patient is hypotensive so he is on levophed, vasopressin and steroid. Patient was complaining of back pain so 1g acetaminophen was given.       PRESSORS: [x ] YES [ ] NO  WHICH:   Levophed 0.85 mcq/kg/min  Vasopressin 0.04units/min       Antimicrobial:  meropenem  IVPB      meropenem  IVPB 500 milliGRAM(s) IV Intermittent every 12 hours    Cardiovascular:  norepinephrine Infusion 0.77 MICROgram(s)/kG/Min IV Continuous <Continuous>    Pulmonary:    Hematalogic:    Other:  chlorhexidine 2% Cloths 1 Application(s) Topical daily  filgrastim-sndz (ZARXIO) Injectable 480 MICROGram(s) SubCutaneous once  hydrocortisone sodium succinate Injectable 50 milliGRAM(s) IV Push every 6 hours  HYDROmorphone  Injectable 0.5 milliGRAM(s) IV Push every 4 hours PRN  lidocaine 2% Jelly 5 milliLiter(s) IntraUrethral every 12 hours PRN  vasopressin Infusion 0.04 Unit(s)/Min IV Continuous <Continuous>    chlorhexidine 2% Cloths 1 Application(s) Topical daily  filgrastim-sndz (ZARXIO) Injectable 480 MICROGram(s) SubCutaneous once  hydrocortisone sodium succinate Injectable 50 milliGRAM(s) IV Push every 6 hours  HYDROmorphone  Injectable 0.5 milliGRAM(s) IV Push every 4 hours PRN  lidocaine 2% Jelly 5 milliLiter(s) IntraUrethral every 12 hours PRN  meropenem  IVPB      meropenem  IVPB 500 milliGRAM(s) IV Intermittent every 12 hours  norepinephrine Infusion 0.77 MICROgram(s)/kG/Min IV Continuous <Continuous>  vasopressin Infusion 0.04 Unit(s)/Min IV Continuous <Continuous>    Drug Dosing Weight  Height (cm): 172.7 (30 Aug 2023 03:15)  Weight (kg): 95.2 (30 Aug 2023 03:15)  BMI (kg/m2): 31.9 (30 Aug 2023 03:15)  BSA (m2): 2.09 (30 Aug 2023 03:15)    PHYSICAL EXAM:  GENERAL: AAOx3  EYES: EOMI, PERRLA  NECK: Supple, No JVD; Normal thyroid; Trachea midline: No LAD   NERVOUS SYSTEM:  Motor Strength 5/5 B/L upper and lower extremities; DTRs 2+ intact and symmetric; Tone normal; ROM full and free  CHEST/LUNG: No rales, rhonchi, wheezing, breath sounds present bilaterally. Pocus- R some B lines, no pleural effusions.  HEART: Regular rate and rhythm; No murmurs, no gallops. Pocus- 4 chamber view: decreased contraction of LV  ABDOMEN: Soft, Nontender, Nondistended; Bowel sounds present, no pain or masses on palpation  : voiding well, Putnam in place  EXTREMITIES: 2+ Peripheral Pulses, No clubbing, cyanosis, or edema  SKIN: warm, intact, no lesions     LINES/DRAINS/DEVICES  CENTRAL LINE: [ ] YES [x ] NO  LOCATION:     [place]: [x ] YES [ ] NO     A-LINE:  [x ] YES [ ] NO  LOCATION: L forearm    ICU Vital Signs Last 24 Hrs  T(C): 35.6 (30 Aug 2023 08:00), Max: 37.8 (29 Aug 2023 23:02)  T(F): 96 (30 Aug 2023 08:00), Max: 100 (29 Aug 2023 23:02)  HR: 121 (30 Aug 2023 11:00) (119 - 165)  BP: 86/70 (30 Aug 2023 08:15) (56/34 - 169/71)  BP(mean): 76 (30 Aug 2023 08:15) (42 - 79)  ABP: 99/59 (30 Aug 2023 11:00) (89/57 - 112/69)  ABP(mean): 71 (30 Aug 2023 11:00) (68 - 83)  RR: 21 (30 Aug 2023 11:00) (18 - 31)  SpO2: 98% (30 Aug 2023 11:00) (96% - 100%)    O2 Parameters below as of 30 Aug 2023 02:25  Patient On (Oxygen Delivery Method): BiPAP/CPAP            ABG - ( 30 Aug 2023 09:29 )  pH, Arterial: 7.32  pH, Blood: x     /  pCO2: 22    /  pO2: 131   / HCO3: 11    / Base Excess: -12.7 /  SaO2: 100                   08-29 @ 07:01  -  08-30 @ 07:00  --------------------------------------------------------  IN: 1991.7 mL / OUT: 0 mL / NET: 1991.7 mL              LABS:  CBC Full  -  ( 30 Aug 2023 03:41 )  WBC Count : 0.05 K/uL  RBC Count : 2.68 M/uL  Hemoglobin : 8.4 g/dL  Hematocrit : 26.0 %  Platelet Count - Automated : 36 K/uL  Mean Cell Volume : 97.0 fl  Mean Cell Hemoglobin : 31.3 pg  Mean Cell Hemoglobin Concentration : 32.3 gm/dL  Auto Neutrophil # : 0.01 K/uL  Auto Lymphocyte # : 0.04 K/uL  Auto Monocyte # : 0.00 K/uL  Auto Eosinophil # : 0.00 K/uL  Auto Basophil # : 0.00 K/uL  Auto Neutrophil % : 20.0 %  Auto Lymphocyte % : 80.0 %  Auto Monocyte % : 0.0 %  Auto Eosinophil % : 0.0 %  Auto Basophil % : 0.0 %    08-30    129<L>  |  100  |  49<H>  ----------------------------<  160<H>  3.9   |  13<L>  |  4.19<H>    Ca    6.6<L>      30 Aug 2023 08:32  Phos  2.2     08-30  Mg     1.4     08-30    TPro  6.4  /  Alb  2.0<L>  /  TBili  1.1  /  DBili  x   /  AST  15  /  ALT  13  /  AlkPhos  130<H>  08-30    PT/INR - ( 29 Aug 2023 17:44 )   PT: 12.5 sec;   INR: 1.10 ratio         PTT - ( 29 Aug 2023 17:44 )  PTT:31.1 sec  Urinalysis Basic - ( 30 Aug 2023 08:32 )    Color: x / Appearance: x / SG: x / pH: x  Gluc: 160 mg/dL / Ketone: x  / Bili: x / Urobili: x   Blood: x / Protein: x / Nitrite: x   Leuk Esterase: x / RBC: x / WBC x   Sq Epi: x / Non Sq Epi: x / Bacteria: x          RADIOLOGY & ADDITIONAL STUDIES REVIEWED DURING TEAM ROUNDS    [ ]GOALS OF CARE DISCUSSION WITH PATIENT/FAMILY/PROXY:    CRITICAL CARE TIME SPENT: 35 minutes   INTERVAL HPI/OVERNIGHT EVENTS: Overnight he had 3 episodes of diarrhea, foul smelling, nonbloody. An arterial line was placed in Left forearm on 8/30. Patient is hypotensive so he is on levophed, vasopressin and steroid. Patient was complaining of back pain so 1g acetaminophen was given.       PRESSORS: [x ] YES [ ] NO  WHICH:   Levophed 0.85 mcq/kg/min  Vasopressin 0.04units/min       Antimicrobial:  meropenem  IVPB      meropenem  IVPB 500 milliGRAM(s) IV Intermittent every 12 hours    Cardiovascular:  norepinephrine Infusion 0.77 MICROgram(s)/kG/Min IV Continuous <Continuous>    Pulmonary:    Hematalogic:    Other:  chlorhexidine 2% Cloths 1 Application(s) Topical daily  filgrastim-sndz (ZARXIO) Injectable 480 MICROGram(s) SubCutaneous once  hydrocortisone sodium succinate Injectable 50 milliGRAM(s) IV Push every 6 hours  HYDROmorphone  Injectable 0.5 milliGRAM(s) IV Push every 4 hours PRN  lidocaine 2% Jelly 5 milliLiter(s) IntraUrethral every 12 hours PRN  vasopressin Infusion 0.04 Unit(s)/Min IV Continuous <Continuous>    chlorhexidine 2% Cloths 1 Application(s) Topical daily  filgrastim-sndz (ZARXIO) Injectable 480 MICROGram(s) SubCutaneous once  hydrocortisone sodium succinate Injectable 50 milliGRAM(s) IV Push every 6 hours  HYDROmorphone  Injectable 0.5 milliGRAM(s) IV Push every 4 hours PRN  lidocaine 2% Jelly 5 milliLiter(s) IntraUrethral every 12 hours PRN  meropenem  IVPB      meropenem  IVPB 500 milliGRAM(s) IV Intermittent every 12 hours  norepinephrine Infusion 0.77 MICROgram(s)/kG/Min IV Continuous <Continuous>  vasopressin Infusion 0.04 Unit(s)/Min IV Continuous <Continuous>    Drug Dosing Weight  Height (cm): 172.7 (30 Aug 2023 03:15)  Weight (kg): 95.2 (30 Aug 2023 03:15)  BMI (kg/m2): 31.9 (30 Aug 2023 03:15)  BSA (m2): 2.09 (30 Aug 2023 03:15)    PHYSICAL EXAM:  GENERAL: AAOx3  EYES: EOMI, PERRLA  NECK: Supple, No JVD; Normal thyroid; Trachea midline: No LAD   NERVOUS SYSTEM:  Motor Strength 5/5 B/L upper and lower extremities; DTRs 2+ intact and symmetric; Tone normal; ROM full and free  CHEST/LUNG: No rales, rhonchi, wheezing, breath sounds present bilaterally. Pocus- R some B lines, no pleural effusions.  HEART: Regular rate and rhythm; No murmurs, no gallops. Pocus- 4 chamber view: decreased contraction of LV  ABDOMEN: Soft, Nontender, Nondistended; Bowel sounds present, no pain or masses on palpation  : voiding well, Putnam in place  EXTREMITIES: 2+ Peripheral Pulses, No clubbing, cyanosis, or edema  SKIN: warm, intact, no lesions     LINES/DRAINS/DEVICES  CENTRAL LINE: [ ] YES [x ] NO  LOCATION:     [place]: [x ] YES [ ] NO     A-LINE:  [x ] YES [ ] NO  LOCATION: L forearm (8/31)    ICU Vital Signs Last 24 Hrs  T(C): 35.6 (30 Aug 2023 08:00), Max: 37.8 (29 Aug 2023 23:02)  T(F): 96 (30 Aug 2023 08:00), Max: 100 (29 Aug 2023 23:02)  HR: 121 (30 Aug 2023 11:00) (119 - 165)  BP: 86/70 (30 Aug 2023 08:15) (56/34 - 169/71)  BP(mean): 76 (30 Aug 2023 08:15) (42 - 79)  ABP: 99/59 (30 Aug 2023 11:00) (89/57 - 112/69)  ABP(mean): 71 (30 Aug 2023 11:00) (68 - 83)  RR: 21 (30 Aug 2023 11:00) (18 - 31)  SpO2: 98% (30 Aug 2023 11:00) (96% - 100%)    O2 Parameters below as of 30 Aug 2023 02:25  Patient On (Oxygen Delivery Method): BiPAP/CPAP            ABG - ( 30 Aug 2023 09:29 )  pH, Arterial: 7.32  pH, Blood: x     /  pCO2: 22    /  pO2: 131   / HCO3: 11    / Base Excess: -12.7 /  SaO2: 100                   08-29 @ 07:01  -  08-30 @ 07:00  --------------------------------------------------------  IN: 1991.7 mL / OUT: 0 mL / NET: 1991.7 mL              LABS:  CBC Full  -  ( 30 Aug 2023 03:41 )  WBC Count : 0.05 K/uL  RBC Count : 2.68 M/uL  Hemoglobin : 8.4 g/dL  Hematocrit : 26.0 %  Platelet Count - Automated : 36 K/uL  Mean Cell Volume : 97.0 fl  Mean Cell Hemoglobin : 31.3 pg  Mean Cell Hemoglobin Concentration : 32.3 gm/dL  Auto Neutrophil # : 0.01 K/uL  Auto Lymphocyte # : 0.04 K/uL  Auto Monocyte # : 0.00 K/uL  Auto Eosinophil # : 0.00 K/uL  Auto Basophil # : 0.00 K/uL  Auto Neutrophil % : 20.0 %  Auto Lymphocyte % : 80.0 %  Auto Monocyte % : 0.0 %  Auto Eosinophil % : 0.0 %  Auto Basophil % : 0.0 %    08-30    129<L>  |  100  |  49<H>  ----------------------------<  160<H>  3.9   |  13<L>  |  4.19<H>    Ca    6.6<L>      30 Aug 2023 08:32  Phos  2.2     08-30  Mg     1.4     08-30    TPro  6.4  /  Alb  2.0<L>  /  TBili  1.1  /  DBili  x   /  AST  15  /  ALT  13  /  AlkPhos  130<H>  08-30    PT/INR - ( 29 Aug 2023 17:44 )   PT: 12.5 sec;   INR: 1.10 ratio         PTT - ( 29 Aug 2023 17:44 )  PTT:31.1 sec  Urinalysis Basic - ( 30 Aug 2023 08:32 )    Color: x / Appearance: x / SG: x / pH: x  Gluc: 160 mg/dL / Ketone: x  / Bili: x / Urobili: x   Blood: x / Protein: x / Nitrite: x   Leuk Esterase: x / RBC: x / WBC x   Sq Epi: x / Non Sq Epi: x / Bacteria: x          RADIOLOGY & ADDITIONAL STUDIES REVIEWED DURING TEAM ROUNDS    [ ]GOALS OF CARE DISCUSSION WITH PATIENT/FAMILY/PROXY:    CRITICAL CARE TIME SPENT: 35 minutes   INTERVAL HPI/OVERNIGHT EVENTS: Overnight he had 3 episodes of diarrhea, foul smelling, nonbloody. An arterial line was placed in Left forearm on 8/30. Patient is hypotensive so he is on levophed, vasopressin and steroid. Patient was complaining of back pain so 1g acetaminophen was given. 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       PRESSORS: [x ] YES [ ] NO  WHICH:   Levophed 0.85 mcq/kg/min  Vasopressin 0.04units/min       Antimicrobial:  meropenem  IVPB      meropenem  IVPB 500 milliGRAM(s) IV Intermittent every 12 hours    Cardiovascular:  norepinephrine Infusion 0.77 MICROgram(s)/kG/Min IV Continuous <Continuous>    Pulmonary:    Hematalogic:    Other:  chlorhexidine 2% Cloths 1 Application(s) Topical daily  filgrastim-sndz (ZARXIO) Injectable 480 MICROGram(s) SubCutaneous once  hydrocortisone sodium succinate Injectable 50 milliGRAM(s) IV Push every 6 hours  HYDROmorphone  Injectable 0.5 milliGRAM(s) IV Push every 4 hours PRN  lidocaine 2% Jelly 5 milliLiter(s) IntraUrethral every 12 hours PRN  vasopressin Infusion 0.04 Unit(s)/Min IV Continuous <Continuous>    chlorhexidine 2% Cloths 1 Application(s) Topical daily  filgrastim-sndz (ZARXIO) Injectable 480 MICROGram(s) SubCutaneous once  hydrocortisone sodium succinate Injectable 50 milliGRAM(s) IV Push every 6 hours  HYDROmorphone  Injectable 0.5 milliGRAM(s) IV Push every 4 hours PRN  lidocaine 2% Jelly 5 milliLiter(s) IntraUrethral every 12 hours PRN  meropenem  IVPB      meropenem  IVPB 500 milliGRAM(s) IV Intermittent every 12 hours  norepinephrine Infusion 0.77 MICROgram(s)/kG/Min IV Continuous <Continuous>  vasopressin Infusion 0.04 Unit(s)/Min IV Continuous <Continuous>    Drug Dosing Weight  Height (cm): 172.7 (30 Aug 2023 03:15)  Weight (kg): 95.2 (30 Aug 2023 03:15)  BMI (kg/m2): 31.9 (30 Aug 2023 03:15)  BSA (m2): 2.09 (30 Aug 2023 03:15)    PHYSICAL EXAM:  GENERAL: AAOx3  EYES: EOMI, PERRLA  NECK: Supple, No JVD; Normal thyroid; Trachea midline: No LAD   NERVOUS SYSTEM:  Motor Strength 5/5 B/L upper and lower extremities; DTRs 2+ intact and symmetric; Tone normal; ROM full and free  CHEST/LUNG: No rales, rhonchi, wheezing, breath sounds present bilaterally. Pocus- R some B lines, no pleural effusions.  HEART: Regular rate and rhythm; No murmurs, no gallops. Pocus- 4 chamber view: decreased contraction of LV  ABDOMEN: Soft, Nontender, Nondistended; Bowel sounds present, no pain or masses on palpation  : voiding well, Putnam in place  EXTREMITIES: 2+ Peripheral Pulses, No clubbing, cyanosis, or edema  SKIN: warm, intact, no lesions     LINES/DRAINS/DEVICES  CENTRAL LINE: [ ] YES [x ] NO  LOCATION:     [place]: [x ] YES [ ] NO     A-LINE:  [x ] YES [ ] NO  LOCATION: L forearm (8/31)    ICU Vital Signs Last 24 Hrs  T(C): 35.6 (30 Aug 2023 08:00), Max: 37.8 (29 Aug 2023 23:02)  T(F): 96 (30 Aug 2023 08:00), Max: 100 (29 Aug 2023 23:02)  HR: 121 (30 Aug 2023 11:00) (119 - 165)  BP: 86/70 (30 Aug 2023 08:15) (56/34 - 169/71)  BP(mean): 76 (30 Aug 2023 08:15) (42 - 79)  ABP: 99/59 (30 Aug 2023 11:00) (89/57 - 112/69)  ABP(mean): 71 (30 Aug 2023 11:00) (68 - 83)  RR: 21 (30 Aug 2023 11:00) (18 - 31)  SpO2: 98% (30 Aug 2023 11:00) (96% - 100%)    O2 Parameters below as of 30 Aug 2023 02:25  Patient On (Oxygen Delivery Method): BiPAP/CPAP            ABG - ( 30 Aug 2023 09:29 )  pH, Arterial: 7.32  pH, Blood: x     /  pCO2: 22    /  pO2: 131   / HCO3: 11    / Base Excess: -12.7 /  SaO2: 100                   08-29 @ 07:01  -  08-30 @ 07:00  --------------------------------------------------------  IN: 1991.7 mL / OUT: 0 mL / NET: 1991.7 mL              LABS:  CBC Full  -  ( 30 Aug 2023 03:41 )  WBC Count : 0.05 K/uL  RBC Count : 2.68 M/uL  Hemoglobin : 8.4 g/dL  Hematocrit : 26.0 %  Platelet Count - Automated : 36 K/uL  Mean Cell Volume : 97.0 fl  Mean Cell Hemoglobin : 31.3 pg  Mean Cell Hemoglobin Concentration : 32.3 gm/dL  Auto Neutrophil # : 0.01 K/uL  Auto Lymphocyte # : 0.04 K/uL  Auto Monocyte # : 0.00 K/uL  Auto Eosinophil # : 0.00 K/uL  Auto Basophil # : 0.00 K/uL  Auto Neutrophil % : 20.0 %  Auto Lymphocyte % : 80.0 %  Auto Monocyte % : 0.0 %  Auto Eosinophil % : 0.0 %  Auto Basophil % : 0.0 %    08-30    129<L>  |  100  |  49<H>  ----------------------------<  160<H>  3.9   |  13<L>  |  4.19<H>    Ca    6.6<L>      30 Aug 2023 08:32  Phos  2.2     08-30  Mg     1.4     08-30    TPro  6.4  /  Alb  2.0<L>  /  TBili  1.1  /  DBili  x   /  AST  15  /  ALT  13  /  AlkPhos  130<H>  08-30    PT/INR - ( 29 Aug 2023 17:44 )   PT: 12.5 sec;   INR: 1.10 ratio         PTT - ( 29 Aug 2023 17:44 )  PTT:31.1 sec  Urinalysis Basic - ( 30 Aug 2023 08:32 )    Color: x / Appearance: x / SG: x / pH: x  Gluc: 160 mg/dL / Ketone: x  / Bili: x / Urobili: x   Blood: x / Protein: x / Nitrite: x   Leuk Esterase: x / RBC: x / WBC x   Sq Epi: x / Non Sq Epi: x / Bacteria: x          RADIOLOGY & ADDITIONAL STUDIES REVIEWED DURING TEAM ROUNDS    [ ]GOALS OF CARE DISCUSSION WITH PATIENT/FAMILY/PROXY:    CRITICAL CARE TIME SPENT: 35 minutes   INTERVAL HPI/OVERNIGHT EVENTS: Overnight he had 3 episodes of diarrhea, foul smelling, nonbloody. An arterial line was placed in Left forearm on 8/30. Patient is hypotensive so he is on levophed, vasopressin and steroid. Patient was complaining of back pain so 1g acetaminophen was given. 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 Patient seems to have a mixed shock cardiogenic shock ( pocus showed decreased LV compression, pending TTE and elevated trops 156.7) and septic shock 2/2 neutropenic fevers.       PRESSORS: [x ] YES [ ] NO  WHICH:   Levophed 0.85 mcq/kg/min  Vasopressin 0.04units/min       Antimicrobial:  meropenem  IVPB      meropenem  IVPB 500 milliGRAM(s) IV Intermittent every 12 hours    Cardiovascular:  norepinephrine Infusion 0.77 MICROgram(s)/kG/Min IV Continuous <Continuous>    Pulmonary:    Hematalogic:    Other:  chlorhexidine 2% Cloths 1 Application(s) Topical daily  filgrastim-sndz (ZARXIO) Injectable 480 MICROGram(s) SubCutaneous once  hydrocortisone sodium succinate Injectable 50 milliGRAM(s) IV Push every 6 hours  HYDROmorphone  Injectable 0.5 milliGRAM(s) IV Push every 4 hours PRN  lidocaine 2% Jelly 5 milliLiter(s) IntraUrethral every 12 hours PRN  vasopressin Infusion 0.04 Unit(s)/Min IV Continuous <Continuous>    chlorhexidine 2% Cloths 1 Application(s) Topical daily  filgrastim-sndz (ZARXIO) Injectable 480 MICROGram(s) SubCutaneous once  hydrocortisone sodium succinate Injectable 50 milliGRAM(s) IV Push every 6 hours  HYDROmorphone  Injectable 0.5 milliGRAM(s) IV Push every 4 hours PRN  lidocaine 2% Jelly 5 milliLiter(s) IntraUrethral every 12 hours PRN  meropenem  IVPB      meropenem  IVPB 500 milliGRAM(s) IV Intermittent every 12 hours  norepinephrine Infusion 0.77 MICROgram(s)/kG/Min IV Continuous <Continuous>  vasopressin Infusion 0.04 Unit(s)/Min IV Continuous <Continuous>    Drug Dosing Weight  Height (cm): 172.7 (30 Aug 2023 03:15)  Weight (kg): 95.2 (30 Aug 2023 03:15)  BMI (kg/m2): 31.9 (30 Aug 2023 03:15)  BSA (m2): 2.09 (30 Aug 2023 03:15)    PHYSICAL EXAM:  GENERAL: AAOx3  EYES: EOMI, PERRLA  NECK: Supple, No JVD; Normal thyroid; Trachea midline: No LAD   NERVOUS SYSTEM:  Motor Strength 5/5 B/L upper and lower extremities; DTRs 2+ intact and symmetric; Tone normal; ROM full and free  CHEST/LUNG: No rales, rhonchi, wheezing, breath sounds present bilaterally. Pocus- R some B lines, no pleural effusions.  HEART: Regular rate and rhythm; No murmurs, no gallops. Pocus- 4 chamber view: decreased contraction of LV  ABDOMEN: Soft, Nontender, Nondistended; Bowel sounds present, no pain or masses on palpation  : voiding well, [place] in place  EXTREMITIES: 2+ Peripheral Pulses, No clubbing, cyanosis, or edema  SKIN: warm, intact, no lesions     LINES/DRAINS/DEVICES  CENTRAL LINE: [ ] YES [x ] NO  LOCATION:     [place]: [x ] YES [ ] NO     A-LINE:  [x ] YES [ ] NO  LOCATION: L forearm (8/31)    ICU Vital Signs Last 24 Hrs  T(C): 35.6 (30 Aug 2023 08:00), Max: 37.8 (29 Aug 2023 23:02)  T(F): 96 (30 Aug 2023 08:00), Max: 100 (29 Aug 2023 23:02)  HR: 121 (30 Aug 2023 11:00) (119 - 165)  BP: 86/70 (30 Aug 2023 08:15) (56/34 - 169/71)  BP(mean): 76 (30 Aug 2023 08:15) (42 - 79)  ABP: 99/59 (30 Aug 2023 11:00) (89/57 - 112/69)  ABP(mean): 71 (30 Aug 2023 11:00) (68 - 83)  RR: 21 (30 Aug 2023 11:00) (18 - 31)  SpO2: 98% (30 Aug 2023 11:00) (96% - 100%)    O2 Parameters below as of 30 Aug 2023 02:25  Patient On (Oxygen Delivery Method): BiPAP/CPAP            ABG - ( 30 Aug 2023 09:29 )  pH, Arterial: 7.32  pH, Blood: x     /  pCO2: 22    /  pO2: 131   / HCO3: 11    / Base Excess: -12.7 /  SaO2: 100                   08-29 @ 07:01  -  08-30 @ 07:00  --------------------------------------------------------  IN: 1991.7 mL / OUT: 0 mL / NET: 1991.7 mL              LABS:  CBC Full  -  ( 30 Aug 2023 03:41 )  WBC Count : 0.05 K/uL  RBC Count : 2.68 M/uL  Hemoglobin : 8.4 g/dL  Hematocrit : 26.0 %  Platelet Count - Automated : 36 K/uL  Mean Cell Volume : 97.0 fl  Mean Cell Hemoglobin : 31.3 pg  Mean Cell Hemoglobin Concentration : 32.3 gm/dL  Auto Neutrophil # : 0.01 K/uL  Auto Lymphocyte # : 0.04 K/uL  Auto Monocyte # : 0.00 K/uL  Auto Eosinophil # : 0.00 K/uL  Auto Basophil # : 0.00 K/uL  Auto Neutrophil % : 20.0 %  Auto Lymphocyte % : 80.0 %  Auto Monocyte % : 0.0 %  Auto Eosinophil % : 0.0 %  Auto Basophil % : 0.0 %    08-30    129<L>  |  100  |  49<H>  ----------------------------<  160<H>  3.9   |  13<L>  |  4.19<H>    Ca    6.6<L>      30 Aug 2023 08:32  Phos  2.2     08-30  Mg     1.4     08-30    TPro  6.4  /  Alb  2.0<L>  /  TBili  1.1  /  DBili  x   /  AST  15  /  ALT  13  /  AlkPhos  130<H>  08-30    PT/INR - ( 29 Aug 2023 17:44 )   PT: 12.5 sec;   INR: 1.10 ratio         PTT - ( 29 Aug 2023 17:44 )  PTT:31.1 sec  Urinalysis Basic - ( 30 Aug 2023 08:32 )    Color: x / Appearance: x / SG: x / pH: x  Gluc: 160 mg/dL / Ketone: x  / Bili: x / Urobili: x   Blood: x / Protein: x / Nitrite: x   Leuk Esterase: x / RBC: x / WBC x   Sq Epi: x / Non Sq Epi: x / Bacteria: x          RADIOLOGY & ADDITIONAL STUDIES REVIEWED DURING TEAM ROUNDS    [ ]GOALS OF CARE DISCUSSION WITH PATIENT/FAMILY/PROXY:    CRITICAL CARE TIME SPENT: 35 minutes

## 2023-08-30 NOTE — PATIENT PROFILE ADULT - FALL HARM RISK - HARM RISK INTERVENTIONS

## 2023-08-31 LAB
ALBUMIN SERPL ELPH-MCNC: 1.7 G/DL — LOW (ref 3.5–5)
ALBUMIN SERPL ELPH-MCNC: 1.7 G/DL — LOW (ref 3.5–5)
ALP SERPL-CCNC: 104 U/L — SIGNIFICANT CHANGE UP (ref 40–120)
ALP SERPL-CCNC: 111 U/L — SIGNIFICANT CHANGE UP (ref 40–120)
ALT FLD-CCNC: 18 U/L DA — SIGNIFICANT CHANGE UP (ref 10–60)
ALT FLD-CCNC: 19 U/L DA — SIGNIFICANT CHANGE UP (ref 10–60)
ANION GAP SERPL CALC-SCNC: 12 MMOL/L — SIGNIFICANT CHANGE UP (ref 5–17)
ANION GAP SERPL CALC-SCNC: 13 MMOL/L — SIGNIFICANT CHANGE UP (ref 5–17)
AST SERPL-CCNC: 19 U/L — SIGNIFICANT CHANGE UP (ref 10–40)
AST SERPL-CCNC: 19 U/L — SIGNIFICANT CHANGE UP (ref 10–40)
BASE EXCESS BLDA CALC-SCNC: -10.2 MMOL/L — LOW (ref -2–3)
BILIRUB SERPL-MCNC: 0.8 MG/DL — SIGNIFICANT CHANGE UP (ref 0.2–1.2)
BILIRUB SERPL-MCNC: 0.9 MG/DL — SIGNIFICANT CHANGE UP (ref 0.2–1.2)
BLOOD GAS COMMENTS ARTERIAL: SIGNIFICANT CHANGE UP
BUN SERPL-MCNC: 59 MG/DL — HIGH (ref 7–18)
BUN SERPL-MCNC: 67 MG/DL — HIGH (ref 7–18)
CALCIUM SERPL-MCNC: 6.6 MG/DL — LOW (ref 8.4–10.5)
CALCIUM SERPL-MCNC: 6.6 MG/DL — LOW (ref 8.4–10.5)
CHLORIDE SERPL-SCNC: 103 MMOL/L — SIGNIFICANT CHANGE UP (ref 96–108)
CHLORIDE SERPL-SCNC: 104 MMOL/L — SIGNIFICANT CHANGE UP (ref 96–108)
CK MB BLD-MCNC: 13.8 % — HIGH (ref 0–3.5)
CK MB CFR SERPL CALC: 12 NG/ML — HIGH (ref 0–3.6)
CK SERPL-CCNC: 87 U/L — SIGNIFICANT CHANGE UP (ref 35–232)
CO2 SERPL-SCNC: 16 MMOL/L — LOW (ref 22–31)
CO2 SERPL-SCNC: 16 MMOL/L — LOW (ref 22–31)
CREAT SERPL-MCNC: 4 MG/DL — HIGH (ref 0.5–1.3)
CREAT SERPL-MCNC: 4.13 MG/DL — HIGH (ref 0.5–1.3)
EGFR: 15 ML/MIN/1.73M2 — LOW
EGFR: 16 ML/MIN/1.73M2 — LOW
GLUCOSE SERPL-MCNC: 118 MG/DL — HIGH (ref 70–99)
GLUCOSE SERPL-MCNC: 141 MG/DL — HIGH (ref 70–99)
HAV IGM SER-ACNC: SIGNIFICANT CHANGE UP
HBV CORE IGM SER-ACNC: SIGNIFICANT CHANGE UP
HBV SURFACE AG SER-ACNC: SIGNIFICANT CHANGE UP
HCO3 BLDA-SCNC: 14 MMOL/L — LOW (ref 21–28)
HCT VFR BLD CALC: 22.1 % — LOW (ref 39–50)
HCT VFR BLD CALC: 24.3 % — LOW (ref 39–50)
HCV AB S/CO SERPL IA: 0.05 S/CO — SIGNIFICANT CHANGE UP (ref 0–0.99)
HCV AB SERPL-IMP: SIGNIFICANT CHANGE UP
HGB BLD-MCNC: 7.4 G/DL — LOW (ref 13–17)
HGB BLD-MCNC: 8 G/DL — LOW (ref 13–17)
HIV 1+2 AB+HIV1 P24 AG SERPL QL IA: SIGNIFICANT CHANGE UP
LACTATE SERPL-SCNC: 1.7 MMOL/L — SIGNIFICANT CHANGE UP (ref 0.7–2)
LACTATE SERPL-SCNC: 2.2 MMOL/L — HIGH (ref 0.7–2)
MAGNESIUM SERPL-MCNC: 2.3 MG/DL — SIGNIFICANT CHANGE UP (ref 1.6–2.6)
MAGNESIUM SERPL-MCNC: 2.4 MG/DL — SIGNIFICANT CHANGE UP (ref 1.6–2.6)
MCHC RBC-ENTMCNC: 30.7 PG — SIGNIFICANT CHANGE UP (ref 27–34)
MCHC RBC-ENTMCNC: 31.5 PG — SIGNIFICANT CHANGE UP (ref 27–34)
MCHC RBC-ENTMCNC: 32.9 GM/DL — SIGNIFICANT CHANGE UP (ref 32–36)
MCHC RBC-ENTMCNC: 33.5 GM/DL — SIGNIFICANT CHANGE UP (ref 32–36)
MCV RBC AUTO: 93.1 FL — SIGNIFICANT CHANGE UP (ref 80–100)
MCV RBC AUTO: 94 FL — SIGNIFICANT CHANGE UP (ref 80–100)
NRBC # BLD: 0 /100 WBCS — SIGNIFICANT CHANGE UP (ref 0–0)
NRBC # BLD: 0 /100 WBCS — SIGNIFICANT CHANGE UP (ref 0–0)
PCO2 BLDA: 28 MMHG — LOW (ref 35–48)
PH BLDA: 7.32 — LOW (ref 7.35–7.45)
PHOSPHATE SERPL-MCNC: 5 MG/DL — HIGH (ref 2.5–4.5)
PHOSPHATE SERPL-MCNC: 5.2 MG/DL — HIGH (ref 2.5–4.5)
PLATELET # BLD AUTO: 17 K/UL — CRITICAL LOW (ref 150–400)
PLATELET # BLD AUTO: 9 K/UL — CRITICAL LOW (ref 150–400)
PO2 BLDA: 122 MMHG — HIGH (ref 83–108)
POTASSIUM SERPL-MCNC: 4.6 MMOL/L — SIGNIFICANT CHANGE UP (ref 3.5–5.3)
POTASSIUM SERPL-MCNC: 4.6 MMOL/L — SIGNIFICANT CHANGE UP (ref 3.5–5.3)
POTASSIUM SERPL-SCNC: 4.6 MMOL/L — SIGNIFICANT CHANGE UP (ref 3.5–5.3)
POTASSIUM SERPL-SCNC: 4.6 MMOL/L — SIGNIFICANT CHANGE UP (ref 3.5–5.3)
PROT SERPL-MCNC: 6 G/DL — SIGNIFICANT CHANGE UP (ref 6–8.3)
PROT SERPL-MCNC: 6.1 G/DL — SIGNIFICANT CHANGE UP (ref 6–8.3)
RBC # BLD: 2.35 M/UL — LOW (ref 4.2–5.8)
RBC # BLD: 2.61 M/UL — LOW (ref 4.2–5.8)
RBC # FLD: 18.6 % — HIGH (ref 10.3–14.5)
RBC # FLD: 18.9 % — HIGH (ref 10.3–14.5)
SAO2 % BLDA: 100 % — SIGNIFICANT CHANGE UP
SODIUM SERPL-SCNC: 131 MMOL/L — LOW (ref 135–145)
SODIUM SERPL-SCNC: 133 MMOL/L — LOW (ref 135–145)
TROPONIN I, HIGH SENSITIVITY RESULT: 1000.9 NG/L — HIGH
TROPONIN I, HIGH SENSITIVITY RESULT: 2094.5 NG/L — HIGH
TROPONIN I, HIGH SENSITIVITY RESULT: 2160.5 NG/L — HIGH
TROPONIN I, HIGH SENSITIVITY RESULT: 576.5 NG/L — HIGH
TROPONIN I, HIGH SENSITIVITY RESULT: 678.3 NG/L — HIGH
VANCOMYCIN TROUGH SERPL-MCNC: 13.1 UG/ML — SIGNIFICANT CHANGE UP (ref 10–20)
WBC # BLD: 0.18 K/UL — CRITICAL LOW (ref 3.8–10.5)
WBC # BLD: 0.2 K/UL — CRITICAL LOW (ref 3.8–10.5)
WBC # FLD AUTO: 0.18 K/UL — CRITICAL LOW (ref 3.8–10.5)
WBC # FLD AUTO: 0.2 K/UL — CRITICAL LOW (ref 3.8–10.5)

## 2023-08-31 PROCEDURE — 93010 ELECTROCARDIOGRAM REPORT: CPT

## 2023-08-31 PROCEDURE — 76775 US EXAM ABDO BACK WALL LIM: CPT | Mod: 26

## 2023-08-31 PROCEDURE — 76857 US EXAM PELVIC LIMITED: CPT | Mod: 26,59

## 2023-08-31 RX ORDER — BUMETANIDE 0.25 MG/ML
2 INJECTION INTRAMUSCULAR; INTRAVENOUS ONCE
Refills: 0 | Status: COMPLETED | OUTPATIENT
Start: 2023-08-31 | End: 2023-08-31

## 2023-08-31 RX ORDER — CALAMINE AND ZINC OXIDE AND PHENOL 160; 10 MG/ML; MG/ML
1 LOTION TOPICAL
Refills: 0 | Status: DISCONTINUED | OUTPATIENT
Start: 2023-08-31 | End: 2023-09-15

## 2023-08-31 RX ORDER — VANCOMYCIN HCL 1 G
1250 VIAL (EA) INTRAVENOUS ONCE
Refills: 0 | Status: DISCONTINUED | OUTPATIENT
Start: 2023-08-31 | End: 2023-08-31

## 2023-08-31 RX ORDER — LANOLIN ALCOHOL/MO/W.PET/CERES
3 CREAM (GRAM) TOPICAL AT BEDTIME
Refills: 0 | Status: DISCONTINUED | OUTPATIENT
Start: 2023-08-31 | End: 2023-09-15

## 2023-08-31 RX ORDER — DOBUTAMINE HCL 250MG/20ML
1.5 VIAL (ML) INTRAVENOUS
Qty: 500 | Refills: 0 | Status: DISCONTINUED | OUTPATIENT
Start: 2023-08-31 | End: 2023-09-02

## 2023-08-31 RX ADMIN — Medication 50 MILLIGRAM(S): at 05:50

## 2023-08-31 RX ADMIN — CALAMINE AND ZINC OXIDE AND PHENOL 1 APPLICATION(S): 160; 10 LOTION TOPICAL at 20:10

## 2023-08-31 RX ADMIN — Medication 50 MILLIGRAM(S): at 17:40

## 2023-08-31 RX ADMIN — Medication 7.14 MICROGRAM(S)/KG/MIN: at 12:27

## 2023-08-31 RX ADMIN — BUMETANIDE 2 MILLIGRAM(S): 0.25 INJECTION INTRAMUSCULAR; INTRAVENOUS at 17:39

## 2023-08-31 RX ADMIN — HYDROMORPHONE HYDROCHLORIDE 0.5 MILLIGRAM(S): 2 INJECTION INTRAMUSCULAR; INTRAVENOUS; SUBCUTANEOUS at 03:35

## 2023-08-31 RX ADMIN — Medication 480 MICROGRAM(S): at 12:15

## 2023-08-31 RX ADMIN — MEROPENEM 100 MILLIGRAM(S): 1 INJECTION INTRAVENOUS at 17:39

## 2023-08-31 RX ADMIN — MEROPENEM 100 MILLIGRAM(S): 1 INJECTION INTRAVENOUS at 05:50

## 2023-08-31 RX ADMIN — HYDROMORPHONE HYDROCHLORIDE 0.5 MILLIGRAM(S): 2 INJECTION INTRAMUSCULAR; INTRAVENOUS; SUBCUTANEOUS at 09:00

## 2023-08-31 RX ADMIN — HYDROMORPHONE HYDROCHLORIDE 0.5 MILLIGRAM(S): 2 INJECTION INTRAMUSCULAR; INTRAVENOUS; SUBCUTANEOUS at 21:38

## 2023-08-31 RX ADMIN — Medication 50 MILLIGRAM(S): at 12:15

## 2023-08-31 RX ADMIN — BUMETANIDE 2 MILLIGRAM(S): 0.25 INJECTION INTRAMUSCULAR; INTRAVENOUS at 12:27

## 2023-08-31 RX ADMIN — CHLORHEXIDINE GLUCONATE 1 APPLICATION(S): 213 SOLUTION TOPICAL at 12:16

## 2023-08-31 RX ADMIN — HYDROMORPHONE HYDROCHLORIDE 0.5 MILLIGRAM(S): 2 INJECTION INTRAMUSCULAR; INTRAVENOUS; SUBCUTANEOUS at 08:43

## 2023-08-31 RX ADMIN — Medication 68.7 MICROGRAM(S)/KG/MIN: at 12:16

## 2023-08-31 RX ADMIN — Medication 68.7 MICROGRAM(S)/KG/MIN: at 04:26

## 2023-08-31 RX ADMIN — HYDROMORPHONE HYDROCHLORIDE 0.5 MILLIGRAM(S): 2 INJECTION INTRAMUSCULAR; INTRAVENOUS; SUBCUTANEOUS at 03:03

## 2023-08-31 RX ADMIN — Medication 3 MILLIGRAM(S): at 21:38

## 2023-08-31 RX ADMIN — HYDROMORPHONE HYDROCHLORIDE 0.5 MILLIGRAM(S): 2 INJECTION INTRAMUSCULAR; INTRAVENOUS; SUBCUTANEOUS at 21:53

## 2023-08-31 NOTE — PROGRESS NOTE ADULT - ASSESSMENT
1. JOSE ANGEL due to ATN from septic shock   CT Scan shows left mild hydroureteronephrosis with collapsed bladder.  -Scr improved to 4.10mg/dl from 4.3mg/d with increased UO. possible renal recovery.  -recommend: urinalysis; FeNa >1%. spot protein to creatinine ratio elevated; rpt after infection resolved.  -Adjust meds to eGFR and avoid IV Gadolinium contrast, NSAIDs, and phosphate enema.  -Monitor I/O's daily.   -Monitor SMA daily.  2. Hypotension due to septic shock  -on two pressor  -monitor BP.  3. Hyponatremia possible multifactorial due to hypovolemia and high ADH state with lack of free water excretion.  -Na improving to 131.  -urine lytes noted.   -Check Seurm Na daily  bid. Monitor I/O's daily. Avoid overcorrection of NA (8-10meq/day)  4. HAGMA due to Lactic Acidosis  -abg noted  -CO2 is improving.   -monitor CO2 and abg.     Patient is critically ill. Time Spent: 35mins.  Discussed with patient in detail regarding the renal plan and care  Discussed the assessment and plan with ICU Team/Nurse

## 2023-08-31 NOTE — CONSULT NOTE ADULT - SUBJECTIVE AND OBJECTIVE BOX
HPI  This is a 62 yo M with pmhx of prostate CA with mets to liver and bone, on Cabazitaxel q3 weeks, presents to the ED for generalized weakness and pain. Per wife, who is at bedside, states patient had an episode of loss of consciousness and fall, with head trauma. Patient also mentions having dysuria since yesterday. patient started this regimen in march, and this is the first time he has had such symptoms. He follows up with Oncologist Dr. Nasir Gondal. He recieved his last chemo treatment 1 week ago.     Urology was consulted for mild L hydroureteronephrosis. Patient was admitted with febrile neutropenia and presumed sepsis with worsening JOSE ANGEL. On initial CT scan patient was found to have mild L hydrouretero the level of the distal ureter which is new from last CT scan in the system a year ago. Patient has a history of prostate cancer with former radiation and channel TURP with evidence of locally advance disease which is now also metastatic to bone/liver. Patient has been afebrile but tachycardic and hypotensive on presentation in setting of febrile neutropenia. Patients labs significant for a WBC of .18, H/H of 8.0/24.3, and a CR of 4.13. Patient has also had low UOP with increasing concern for possible hemodialysis. Patient endorses R flank pain but denies any L flank pain. He denies fevers, endorses intermittent chills, and denies nausea/vomiting.     PAST MEDICAL & SURGICAL HISTORY:  CA of prostate      Enlarged prostate with lower urinary tract symptoms (LUTS)      No significant past surgical history          MEDICATIONS  (STANDING):  chlorhexidine 2% Cloths 1 Application(s) Topical daily  DOBUTamine Infusion 2.5 MICROgram(s)/kG/Min (7.14 mL/Hr) IV Continuous <Continuous>  filgrastim-sndz (ZARXIO) Injectable 480 MICROGram(s) SubCutaneous daily  hydrocortisone sodium succinate Injectable 50 milliGRAM(s) IV Push every 6 hours  melatonin 3 milliGRAM(s) Oral at bedtime  meropenem  IVPB      meropenem  IVPB 500 milliGRAM(s) IV Intermittent every 12 hours  norepinephrine Infusion 0.77 MICROgram(s)/kG/Min (68.7 mL/Hr) IV Continuous <Continuous>  vasopressin Infusion 0.04 Unit(s)/Min (6 mL/Hr) IV Continuous <Continuous>    MEDICATIONS  (PRN):  HYDROmorphone  Injectable 0.5 milliGRAM(s) IV Push every 4 hours PRN Severe Pain (7 - 10)  lidocaine 2% Jelly 5 milliLiter(s) IntraUrethral every 12 hours PRN Pain at koehler site      FAMILY HISTORY:  Family history of hypertension (Mother)        Allergies    No Known Allergies    Intolerances        SOCIAL HISTORY:    REVIEW OF SYSTEMS: Otherwise negative as stated in HPI    Physical Exam  Vital signs  T(C): 36.1 (08-31-23 @ 04:00), Max: 36.1 (08-31-23 @ 00:00)  HR: 105 (08-31-23 @ 12:30)  BP: --  SpO2: 100% (08-31-23 @ 12:30)  Wt(kg): --    Output    OUT:    Indwelling Catheter - Urethral (mL): 865 mL  Total OUT: 865 mL    Total NET: -865 mL      OUT:    Indwelling Catheter - Urethral (mL): 150 mL  Total OUT: 150 mL    Total NET: -150 mL          Gen:  NAD    Pulm:  No respiratory distress  	  CV:  RRR    GI:  S/ND/NT    :  No bilateral R CVAT    MSK:      LABS:      08-31 @ 04:18    WBC 0.18  / Hct 24.3  / SCr 4.13     08-30 @ 14:21    WBC 0.11  / Hct 25.8  / SCr 4.29     08-31    131<L>  |  103  |  59<H>  ----------------------------<  118<H>  4.6   |  16<L>  |  4.13<H>    Ca    6.6<L>      31 Aug 2023 04:18  Phos  5.0     08-31  Mg     2.3     08-31    TPro  6.1  /  Alb  1.7<L>  /  TBili  0.9  /  DBili  x   /  AST  19  /  ALT  19  /  AlkPhos  111  08-31    PT/INR - ( 30 Aug 2023 14:21 )   PT: 15.3 sec;   INR: 1.35 ratio         PTT - ( 30 Aug 2023 14:21 )  PTT:31.0 sec  Urinalysis Basic - ( 31 Aug 2023 04:18 )    Color: x / Appearance: x / SG: x / pH: x  Gluc: 118 mg/dL / Ketone: x  / Bili: x / Urobili: x   Blood: x / Protein: x / Nitrite: x   Leuk Esterase: x / RBC: x / WBC x   Sq Epi: x / Non Sq Epi: x / Bacteria: x        Urine Cx:  Blood Cx:    RADIOLOGY:

## 2023-08-31 NOTE — PROGRESS NOTE ADULT - ASSESSMENT
Septic shock  UTI  Neutropenia  ? Pneumonia       Plan - Cont Meropenem 500mgs iv q12hrs for now  Await culture results  Time spent - 32 mins

## 2023-08-31 NOTE — PROGRESS NOTE ADULT - SUBJECTIVE AND OBJECTIVE BOX
INTERVAL HPI/OVERNIGHT EVENTS:       PRESSORS: [ ] YES [ ] NO  WHICH:    ANTIBIOTICS:                  DATE STARTED:  ANTIBIOTICS:                  DATE STARTED:    Antimicrobial:  meropenem  IVPB      meropenem  IVPB 500 milliGRAM(s) IV Intermittent every 12 hours    Cardiovascular:  norepinephrine Infusion 0.77 MICROgram(s)/kG/Min IV Continuous <Continuous>    Pulmonary:    Hematalogic:    Other:  chlorhexidine 2% Cloths 1 Application(s) Topical daily  filgrastim-sndz (ZARXIO) Injectable 480 MICROGram(s) SubCutaneous daily  hydrocortisone sodium succinate Injectable 50 milliGRAM(s) IV Push every 6 hours  HYDROmorphone  Injectable 0.5 milliGRAM(s) IV Push every 4 hours PRN  lidocaine 2% Jelly 5 milliLiter(s) IntraUrethral every 12 hours PRN  vasopressin Infusion 0.04 Unit(s)/Min IV Continuous <Continuous>    chlorhexidine 2% Cloths 1 Application(s) Topical daily  filgrastim-sndz (ZARXIO) Injectable 480 MICROGram(s) SubCutaneous daily  hydrocortisone sodium succinate Injectable 50 milliGRAM(s) IV Push every 6 hours  HYDROmorphone  Injectable 0.5 milliGRAM(s) IV Push every 4 hours PRN  lidocaine 2% Jelly 5 milliLiter(s) IntraUrethral every 12 hours PRN  meropenem  IVPB      meropenem  IVPB 500 milliGRAM(s) IV Intermittent every 12 hours  norepinephrine Infusion 0.77 MICROgram(s)/kG/Min IV Continuous <Continuous>  vasopressin Infusion 0.04 Unit(s)/Min IV Continuous <Continuous>    Drug Dosing Weight  Height (cm): 172.7 (30 Aug 2023 03:15)  Weight (kg): 95.2 (30 Aug 2023 03:15)  BMI (kg/m2): 31.9 (30 Aug 2023 03:15)  BSA (m2): 2.09 (30 Aug 2023 03:15)    PHYSICAL EXAM:  GENERAL: NAD  EYES: EOMI, PERRLA  NECK: Supple, No JVD; Normal thyroid; Trachea midline: No LAD   NERVOUS SYSTEM:  Alert & Oriented X3,  Motor Strength 5/5 B/L upper and lower extremities; DTRs 2+ intact and symmetric  CHEST/LUNG: No rales, rhonchi, wheezing, breath sounds present bilaterally  HEART: Regular rate and rhythm; No murmurs, no gallops  ABDOMEN: Soft, Nontender, Nondistended; Bowel sounds present, no pain or masses on palpation  : voiding well, Putnam in place  EXTREMITIES:  2+ Peripheral Pulses, No clubbing, cyanosis, or edema  SKIN: warm, intact, no lesions     LINES/DRAINS/DEVICES  CENTRAL LINE: [ ] YES [ ] NO  LOCATION:     PUTNAM: [ ] YES [ ] NO     A-LINE:  [ ] YES [ ] NO  LOCATION:       ICU Vital Signs Last 24 Hrs  T(C): 36.1 (31 Aug 2023 04:00), Max: 36.1 (31 Aug 2023 00:00)  T(F): 96.9 (31 Aug 2023 04:00), Max: 96.9 (31 Aug 2023 00:00)  HR: 101 (31 Aug 2023 07:15) (100 - 125)  BP: 86/70 (30 Aug 2023 08:15) (84/70 - 90/64)  BP(mean): 76 (30 Aug 2023 08:15) (73 - 76)  ABP: 105/62 (31 Aug 2023 07:15) (89/57 - 114/71)  ABP(mean): 78 (31 Aug 2023 07:15) (65 - 84)  RR: 16 (31 Aug 2023 07:15) (12 - 31)  SpO2: 100% (31 Aug 2023 07:15) (95% - 100%)        ABG - ( 30 Aug 2023 14:32 )  pH, Arterial: 7.26  pH, Blood: x     /  pCO2: 29    /  pO2: 100   / HCO3: 13    / Base Excess: -12.6 /  SaO2: 100                   08-30 @ 07:01  -  08-31 @ 07:00  --------------------------------------------------------  IN: 2610.1 mL / OUT: 865 mL / NET: 1745.1 mL              LABS:  CBC Full  -  ( 31 Aug 2023 04:18 )  WBC Count : 0.18 K/uL  RBC Count : 2.61 M/uL  Hemoglobin : 8.0 g/dL  Hematocrit : 24.3 %  Platelet Count - Automated : 9 K/uL  Mean Cell Volume : 93.1 fl  Mean Cell Hemoglobin : 30.7 pg  Mean Cell Hemoglobin Concentration : 32.9 gm/dL  Auto Neutrophil # : x  Auto Lymphocyte # : x  Auto Monocyte # : x  Auto Eosinophil # : x  Auto Basophil # : x  Auto Neutrophil % : x  Auto Lymphocyte % : x  Auto Monocyte % : x  Auto Eosinophil % : x  Auto Basophil % : x    08-31    131<L>  |  103  |  59<H>  ----------------------------<  118<H>  4.6   |  16<L>  |  4.13<H>    Ca    6.6<L>      31 Aug 2023 04:18  Phos  5.0     08-31  Mg     2.3     08-31    TPro  6.1  /  Alb  1.7<L>  /  TBili  0.9  /  DBili  x   /  AST  19  /  ALT  19  /  AlkPhos  111  08-31    PT/INR - ( 30 Aug 2023 14:21 )   PT: 15.3 sec;   INR: 1.35 ratio         PTT - ( 30 Aug 2023 14:21 )  PTT:31.0 sec  Urinalysis Basic - ( 31 Aug 2023 04:18 )    Color: x / Appearance: x / SG: x / pH: x  Gluc: 118 mg/dL / Ketone: x  / Bili: x / Urobili: x   Blood: x / Protein: x / Nitrite: x   Leuk Esterase: x / RBC: x / WBC x   Sq Epi: x / Non Sq Epi: x / Bacteria: x      Culture Results:   No growth at 24 hours (08-29 @ 21:40)  Culture Results:   No growth at 24 hours (08-29 @ 21:40)      RADIOLOGY & ADDITIONAL STUDIES REVIEWED DURING TEAM ROUNDS    [ ]GOALS OF CARE DISCUSSION WITH PATIENT/FAMILY/PROXY:    CRITICAL CARE TIME SPENT: 35 minutes   Progress note Critical care resident PGY1 discussed with ICU  Attending    INTERVAL HPI/OVERNIGHT EVENTS:   Overnight( 2: 00 AM) patient complained of severe (10/10), burning pain at Putnam catheter site- 0.5 mg DILAUDID was given, currently pain is 2-3/10.  Patient continues to complain of R sided back pain- 4-5 /10.  Troponin trending up-(678 this morning)  Lactate trending down-(2.2 this morning)    Patient's wife( Ms Garcia) states that he was unable to sleep last night- When he was asleep this morning she noticed that he continuously had laboured breathing, associated with snoring and intermittent gasping. She also endorses that patient has been confused- " Has been talking about random, unrelated topics- not answering questions with normal responses"  Last bowel movement was on Tuesday. Denies abdominal pain, bloating, NVD.          PRESSORS: [ ] YES [ ] NO  WHICH:    ANTIBIOTICS:                  DATE STARTED:  ANTIBIOTICS:                  DATE STARTED:    Antimicrobial:  meropenem  IVPB      meropenem  IVPB 500 milliGRAM(s) IV Intermittent every 12 hours    Cardiovascular:  norepinephrine Infusion 0.77 MICROgram(s)/kG/Min IV Continuous <Continuous>    Pulmonary:    Hematalogic:    Other:  chlorhexidine 2% Cloths 1 Application(s) Topical daily  filgrastim-sndz (ZARXIO) Injectable 480 MICROGram(s) SubCutaneous daily  hydrocortisone sodium succinate Injectable 50 milliGRAM(s) IV Push every 6 hours  HYDROmorphone  Injectable 0.5 milliGRAM(s) IV Push every 4 hours PRN  lidocaine 2% Jelly 5 milliLiter(s) IntraUrethral every 12 hours PRN  vasopressin Infusion 0.04 Unit(s)/Min IV Continuous <Continuous>    chlorhexidine 2% Cloths 1 Application(s) Topical daily  filgrastim-sndz (ZARXIO) Injectable 480 MICROGram(s) SubCutaneous daily  hydrocortisone sodium succinate Injectable 50 milliGRAM(s) IV Push every 6 hours  HYDROmorphone  Injectable 0.5 milliGRAM(s) IV Push every 4 hours PRN  lidocaine 2% Jelly 5 milliLiter(s) IntraUrethral every 12 hours PRN  meropenem  IVPB      meropenem  IVPB 500 milliGRAM(s) IV Intermittent every 12 hours  norepinephrine Infusion 0.77 MICROgram(s)/kG/Min IV Continuous <Continuous>  vasopressin Infusion 0.04 Unit(s)/Min IV Continuous <Continuous>    Drug Dosing Weight  Height (cm): 172.7 (30 Aug 2023 03:15)  Weight (kg): 95.2 (30 Aug 2023 03:15)  BMI (kg/m2): 31.9 (30 Aug 2023 03:15)  BSA (m2): 2.09 (30 Aug 2023 03:15)    PHYSICAL EXAM:  GENERAL: NAD  EYES: EOMI, PERRLA  NECK: Supple, No JVD; Normal thyroid; Trachea midline: No LAD   NERVOUS SYSTEM:  Alert & Oriented X3,  Motor Strength 5/5 B/L upper and lower extremities; DTRs 2+ intact and symmetric  CHEST/LUNG: No rales, rhonchi, wheezing, breath sounds present bilaterally  HEART: Regular rate and rhythm; No murmurs, no gallops  ABDOMEN: Soft, Nontender, Nondistended; Bowel sounds present, no pain or masses on palpation  : voiding well, Putnam in place  EXTREMITIES:  2+ Peripheral Pulses, No clubbing, cyanosis, or edema  SKIN: warm, intact, no lesions     LINES/DRAINS/DEVICES  CENTRAL LINE: [ ] YES [ ] NO  LOCATION:     PUTNAM: [ ] YES [ ] NO     A-LINE:  [ ] YES [ ] NO  LOCATION:       ICU Vital Signs Last 24 Hrs  T(C): 36.1 (31 Aug 2023 04:00), Max: 36.1 (31 Aug 2023 00:00)  T(F): 96.9 (31 Aug 2023 04:00), Max: 96.9 (31 Aug 2023 00:00)  HR: 101 (31 Aug 2023 07:15) (100 - 125)  BP: 86/70 (30 Aug 2023 08:15) (84/70 - 90/64)  BP(mean): 76 (30 Aug 2023 08:15) (73 - 76)  ABP: 105/62 (31 Aug 2023 07:15) (89/57 - 114/71)  ABP(mean): 78 (31 Aug 2023 07:15) (65 - 84)  RR: 16 (31 Aug 2023 07:15) (12 - 31)  SpO2: 100% (31 Aug 2023 07:15) (95% - 100%)        ABG - ( 30 Aug 2023 14:32 )  pH, Arterial: 7.26  pH, Blood: x     /  pCO2: 29    /  pO2: 100   / HCO3: 13    / Base Excess: -12.6 /  SaO2: 100                   08-30 @ 07:01  -  08-31 @ 07:00  --------------------------------------------------------  IN: 2610.1 mL / OUT: 865 mL / NET: 1745.1 mL              LABS:  CBC Full  -  ( 31 Aug 2023 04:18 )  WBC Count : 0.18 K/uL  RBC Count : 2.61 M/uL  Hemoglobin : 8.0 g/dL  Hematocrit : 24.3 %  Platelet Count - Automated : 9 K/uL  Mean Cell Volume : 93.1 fl  Mean Cell Hemoglobin : 30.7 pg  Mean Cell Hemoglobin Concentration : 32.9 gm/dL  Auto Neutrophil # : x  Auto Lymphocyte # : x  Auto Monocyte # : x  Auto Eosinophil # : x  Auto Basophil # : x  Auto Neutrophil % : x  Auto Lymphocyte % : x  Auto Monocyte % : x  Auto Eosinophil % : x  Auto Basophil % : x    08-31    131<L>  |  103  |  59<H>  ----------------------------<  118<H>  4.6   |  16<L>  |  4.13<H>    Ca    6.6<L>      31 Aug 2023 04:18  Phos  5.0     08-31  Mg     2.3     08-31    TPro  6.1  /  Alb  1.7<L>  /  TBili  0.9  /  DBili  x   /  AST  19  /  ALT  19  /  AlkPhos  111  08-31    PT/INR - ( 30 Aug 2023 14:21 )   PT: 15.3 sec;   INR: 1.35 ratio         PTT - ( 30 Aug 2023 14:21 )  PTT:31.0 sec  Urinalysis Basic - ( 31 Aug 2023 04:18 )    Color: x / Appearance: x / SG: x / pH: x  Gluc: 118 mg/dL / Ketone: x  / Bili: x / Urobili: x   Blood: x / Protein: x / Nitrite: x   Leuk Esterase: x / RBC: x / WBC x   Sq Epi: x / Non Sq Epi: x / Bacteria: x      Culture Results:   No growth at 24 hours (08-29 @ 21:40)  Culture Results:   No growth at 24 hours (08-29 @ 21:40)      RADIOLOGY & ADDITIONAL STUDIES REVIEWED DURING TEAM ROUNDS    [ ]GOALS OF CARE DISCUSSION WITH PATIENT/FAMILY/PROXY:    CRITICAL CARE TIME SPENT: 35 minutes   Progress note Critical care resident PGY1 discussed with ICU  Attending    INTERVAL HPI/OVERNIGHT EVENTS:   Overnight( 2: 00 AM) patient complained of severe (10/10), burning pain at Putnam catheter site- 0.5 mg DILAUDID was given, currently pain is 2-3/10.  Patient continues to complain of R sided flank pain- 4-5 /10.  Troponin trending up-(678 this morning,now 1000))  Lactate trending down-(2.2 this morning, now 1.7- normalized    Patient's wife( Ms. Garcia) states that he was unable to sleep last night- When he was asleep this morning she noticed that he continuously had laboured breathing, associated with snoring and intermittent gasping. She also endorses that patient has been confused- " Has been talking about random, unrelated topics- not answering questions with normal responses" . Baseline is AOx3  Last bowel movement was on Tuesday. Denies abdominal pain, bloating, NVD, chest pain SOB, palpitations, dizziness.  Patient was hungry, requesting for food- will advance diet to normal -po    Cancelled stool studies ivo no further episodes of diarrhea since Tuesday.    TTE (8/30/23)-  < from: TTE with Doppler (w/Cont) (08.30.23 @ 08:58) >  EF 15-20%   Severe global left ventricular systolic  dysfunction. Ultrasound LV opacification agent was used to  enhance endocardial definition. Severe global hypokinesia.  Normal left ventricular internal dimensions and wall  thicknesses.   Grade I diastolic dysfunction (Impaired  relaxation, mild).    < end of copied text >            PRESSORS: [ ] YES [ ] NO  WHICH:    ANTIBIOTICS:                  DATE STARTED:  ANTIBIOTICS:                  DATE STARTED:    Antimicrobial:  meropenem  IVPB      meropenem  IVPB 500 milliGRAM(s) IV Intermittent every 12 hours    Cardiovascular:  norepinephrine Infusion 0.77 MICROgram(s)/kG/Min IV Continuous <Continuous>    Pulmonary:    Hematalogic:    Other:  chlorhexidine 2% Cloths 1 Application(s) Topical daily  filgrastim-sndz (ZARXIO) Injectable 480 MICROGram(s) SubCutaneous daily  hydrocortisone sodium succinate Injectable 50 milliGRAM(s) IV Push every 6 hours  HYDROmorphone  Injectable 0.5 milliGRAM(s) IV Push every 4 hours PRN  lidocaine 2% Jelly 5 milliLiter(s) IntraUrethral every 12 hours PRN  vasopressin Infusion 0.04 Unit(s)/Min IV Continuous <Continuous>    chlorhexidine 2% Cloths 1 Application(s) Topical daily  filgrastim-sndz (ZARXIO) Injectable 480 MICROGram(s) SubCutaneous daily  hydrocortisone sodium succinate Injectable 50 milliGRAM(s) IV Push every 6 hours  HYDROmorphone  Injectable 0.5 milliGRAM(s) IV Push every 4 hours PRN  lidocaine 2% Jelly 5 milliLiter(s) IntraUrethral every 12 hours PRN  meropenem  IVPB      meropenem  IVPB 500 milliGRAM(s) IV Intermittent every 12 hours  norepinephrine Infusion 0.77 MICROgram(s)/kG/Min IV Continuous <Continuous>  vasopressin Infusion 0.04 Unit(s)/Min IV Continuous <Continuous>    Drug Dosing Weight  Height (cm): 172.7 (30 Aug 2023 03:15)  Weight (kg): 95.2 (30 Aug 2023 03:15)  BMI (kg/m2): 31.9 (30 Aug 2023 03:15)  BSA (m2): 2.09 (30 Aug 2023 03:15)    PHYSICAL EXAM:  GENERAL: NAD  EYES: EOMI, PERRLA  NECK: Supple, No JVD; Normal thyroid; Trachea midline: No LAD   NERVOUS SYSTEM:  Alert & Oriented X 2-3(not oriented to time),  Motor Strength 5/5 B/L upper and lower extremities; DTRs 2+ intact and symmetric  CHEST/LUNG: No rales, rhonchi, wheezing, breath sounds present bilaterally  HEART: sinus [id number] regular rhythm; No murmurs, no gallops  ABDOMEN: Soft, Nontender, Nondistended; Bowel sounds present, no pain or masses on palpation, + R CVA tenderness  :  Putnam in place- net output-3.57  EXTREMITIES:  2+ Peripheral Pulses, No clubbing, cyanosis, or edema  SKIN: warm, intact, no lesions     LINES/DRAINS/DEVICES  CENTRAL LINE: [ ] YES [ ] NO  LOCATION:     PUTNAM: [ ] YES [ ] NO     A-LINE:  [ ] YES [ ] NO  LOCATION:       ICU Vital Signs Last 24 Hrs  T(C): 36.1 (31 Aug 2023 04:00), Max: 36.1 (31 Aug 2023 00:00)  T(F): 96.9 (31 Aug 2023 04:00), Max: 96.9 (31 Aug 2023 00:00)  HR: 101 (31 Aug 2023 07:15) (100 - 125)  BP: 86/70 (30 Aug 2023 08:15) (84/70 - 90/64)  BP(mean): 76 (30 Aug 2023 08:15) (73 - 76)  ABP: 105/62 (31 Aug 2023 07:15) (89/57 - 114/71)  ABP(mean): 78 (31 Aug 2023 07:15) (65 - 84)  RR: 16 (31 Aug 2023 07:15) (12 - 31)  SpO2: 100% (31 Aug 2023 07:15) (95% - 100%)        ABG - ( 30 Aug 2023 14:32 )  pH, Arterial: 7.26  pH, Blood: x     /  pCO2: 29    /  pO2: 100   / HCO3: 13    / Base Excess: -12.6 /  SaO2: 100                   08-30 @ 07:01  -  08-31 @ 07:00  --------------------------------------------------------  IN: 2610.1 mL / OUT: 865 mL / NET: 1745.1 mL              LABS:  CBC Full  -  ( 31 Aug 2023 04:18 )  WBC Count : 0.18 K/uL  RBC Count : 2.61 M/uL  Hemoglobin : 8.0 g/dL  Hematocrit : 24.3 %  Platelet Count - Automated : 9 K/uL  Mean Cell Volume : 93.1 fl  Mean Cell Hemoglobin : 30.7 pg  Mean Cell Hemoglobin Concentration : 32.9 gm/dL  Auto Neutrophil # : x  Auto Lymphocyte # : x  Auto Monocyte # : x  Auto Eosinophil # : x  Auto Basophil # : x  Auto Neutrophil % : x  Auto Lymphocyte % : x  Auto Monocyte % : x  Auto Eosinophil % : x  Auto Basophil % : x    08-31    131<L>  |  103  |  59<H>  ----------------------------<  118<H>  4.6   |  16<L>  |  4.13<H>    Ca    6.6<L>      31 Aug 2023 04:18  Phos  5.0     08-31  Mg     2.3     08-31    TPro  6.1  /  Alb  1.7<L>  /  TBili  0.9  /  DBili  x   /  AST  19  /  ALT  19  /  AlkPhos  111  08-31    PT/INR - ( 30 Aug 2023 14:21 )   PT: 15.3 sec;   INR: 1.35 ratio         PTT - ( 30 Aug 2023 14:21 )  PTT:31.0 sec  Urinalysis Basic - ( 31 Aug 2023 04:18 )    Color: x / Appearance: x / SG: x / pH: x  Gluc: 118 mg/dL / Ketone: x  / Bili: x / Urobili: x   Blood: x / Protein: x / Nitrite: x   Leuk Esterase: x / RBC: x / WBC x   Sq Epi: x / Non Sq Epi: x / Bacteria: x      Culture Results:   No growth at 24 hours (08-29 @ 21:40)  Culture Results:   No growth at 24 hours (08-29 @ 21:40)      RADIOLOGY & ADDITIONAL STUDIES REVIEWED DURING TEAM ROUNDS    [ ]GOALS OF CARE DISCUSSION WITH PATIENT/FAMILY/PROXY:    CRITICAL CARE TIME SPENT: 35 minutes

## 2023-08-31 NOTE — PROGRESS NOTE ADULT - SUBJECTIVE AND OBJECTIVE BOX
63y Male    Meds:  meropenem  IVPB      meropenem  IVPB 500 milliGRAM(s) IV Intermittent every 12 hours  vancomycin  IVPB 1250 milliGRAM(s) IV Intermittent once    Allergies    No Known Allergies    Intolerances        VITALS:  Vital Signs Last 24 Hrs  T(C): 36.1 (31 Aug 2023 16:49), Max: 36.2 (31 Aug 2023 10:00)  T(F): 96.9 (31 Aug 2023 16:49), Max: 97.2 (31 Aug 2023 10:00)  HR: 106 (31 Aug 2023 16:30) (100 - 113)  BP: --  BP(mean): --  RR: 22 (31 Aug 2023 16:30) (12 - 32)  SpO2: 100% (31 Aug 2023 16:30) (95% - 100%)        LABS/DIAGNOSTIC TESTS:                          7.4    0.20  )-----------( 17       ( 31 Aug 2023 16:10 )             22.1     Lactate, Blood: 1.7 mmol/L (08-31 @ 08:49)  Lactate, Blood: 2.2 mmol/L (08-31 @ 04:18)  Lactate, Blood: 3.2 mmol/L (08-30 @ 20:49)      08-31    133<L>  |  104  |  67<H>  ----------------------------<  141<H>  4.6   |  16<L>  |  4.00<H>    Ca    6.6<L>      31 Aug 2023 16:10  Phos  5.2     08-31  Mg     2.4     08-31    TPro  6.0  /  Alb  1.7<L>  /  TBili  0.8  /  DBili  x   /  AST  19  /  ALT  18  /  AlkPhos  104  08-31      LIVER FUNCTIONS - ( 31 Aug 2023 16:10 )  Alb: 1.7 g/dL / Pro: 6.0 g/dL / ALK PHOS: 104 U/L / ALT: 18 U/L DA / AST: 19 U/L / GGT: x             CULTURES: .Blood Blood-Peripheral  08-29 @ 21:40   No growth at 24 hours  --  --            RADIOLOGY:      ROS:  [  ] UNABLE TO ELICIT ICU VISIT  63y Male who is still in the ICU, he is feeling better and his fevers have resolved but is now on 3 pressors. He was given another dose of Vancomycin. His cr is decreasing slowly. He has no more diarrhea, no nausea or vomiting , no abdominal pain and no urinary symptoms. He has some coughing still but no SOB or chest pain.    Meds:  meropenem  IVPB      meropenem  IVPB 500 milliGRAM(s) IV Intermittent every 12 hours  vancomycin  IVPB 1250 milliGRAM(s) IV Intermittent once    Allergies    No Known Allergies    Intolerances        VITALS:  Vital Signs Last 24 Hrs  T(C): 36.1 (31 Aug 2023 16:49), Max: 36.2 (31 Aug 2023 10:00)  T(F): 96.9 (31 Aug 2023 16:49), Max: 97.2 (31 Aug 2023 10:00)  HR: 106 (31 Aug 2023 16:30) (100 - 113)  BP: --  BP(mean): --  RR: 22 (31 Aug 2023 16:30) (12 - 32)  SpO2: 100% (31 Aug 2023 16:30) (95% - 100%)        LABS/DIAGNOSTIC TESTS:                          7.4    0.20  )-----------( 17       ( 31 Aug 2023 16:10 )             22.1     Lactate, Blood: 1.7 mmol/L (08-31 @ 08:49)  Lactate, Blood: 2.2 mmol/L (08-31 @ 04:18)  Lactate, Blood: 3.2 mmol/L (08-30 @ 20:49)      08-31    133<L>  |  104  |  67<H>  ----------------------------<  141<H>  4.6   |  16<L>  |  4.00<H>    Ca    6.6<L>      31 Aug 2023 16:10  Phos  5.2     08-31  Mg     2.4     08-31    TPro  6.0  /  Alb  1.7<L>  /  TBili  0.8  /  DBili  x   /  AST  19  /  ALT  18  /  AlkPhos  104  08-31      LIVER FUNCTIONS - ( 31 Aug 2023 16:10 )  Alb: 1.7 g/dL / Pro: 6.0 g/dL / ALK PHOS: 104 U/L / ALT: 18 U/L DA / AST: 19 U/L / GGT: x             CULTURES: .Blood Blood-Peripheral  08-29 @ 21:40   No growth at 24 hours  --  --            RADIOLOGY:      ROS:  [  ] UNABLE TO ELICIT

## 2023-08-31 NOTE — PROGRESS NOTE ADULT - SUBJECTIVE AND OBJECTIVE BOX
NEPHROLOGY MEDICAL CARE, [facility] - Dr. Shukri Fair/ Dr. Caterina Gibbons/ Dr. Papa Conley/ Dr. Lissa Rene    Date of Service: 08-31-23 @ 16:14    Patient was seen and examined at bedside.    CC: patient is okay and NAD  pt still on two pressor; increase UO.    Vital Signs Last 24 Hrs  T(C): 36.1 (31 Aug 2023 04:00), Max: 36.1 (31 Aug 2023 00:00)  T(F): 96.9 (31 Aug 2023 04:00), Max: 96.9 (31 Aug 2023 00:00)  HR: 107 (31 Aug 2023 15:55) (100 - 113)  BP: --  BP(mean): --  RR: 18 (31 Aug 2023 15:45) (12 - 32)  SpO2: 100% (31 Aug 2023 15:55) (95% - 100%)        08-30 @ 07:01 - 08-31 @ 07:00  --------------------------------------------------------  IN: 2610.1 mL / OUT: 865 mL / NET: 1745.1 mL    08-31 @ 07:01  -  08-31 @ 16:14  --------------------------------------------------------  IN: 435 mL / OUT: 150 mL / NET: 285 mL        PHYSICAL EXAM:  General: No acute respiratory distress.  Eyes: conjunctiva and sclera clear  ENMT: Atraumatic, Normocephalic, supple,   Respiratory: Bilateral poor air entry; No rales, rhonchi, wheezing  Cardiovascular: S1S2+; no m/r/g  Gastrointestinal: Soft, Non-tender, Nondistended; Bowel sounds present  : koehler's cath with clear urine.  Neuro:  Awake, Alert & Oriented X3,   Ext:  trace edema, No Cyanosis  Skin: No visible rashes      MEDICATIONS:  MEDICATIONS  (STANDING):  buMETAnide Injectable 2 milliGRAM(s) IV Push once  chlorhexidine 2% Cloths 1 Application(s) Topical daily  DOBUTamine Infusion 2.5 MICROgram(s)/kG/Min (7.14 mL/Hr) IV Continuous <Continuous>  filgrastim-sndz (ZARXIO) Injectable 480 MICROGram(s) SubCutaneous daily  hydrocortisone sodium succinate Injectable 50 milliGRAM(s) IV Push every 6 hours  melatonin 3 milliGRAM(s) Oral at bedtime  meropenem  IVPB      meropenem  IVPB 500 milliGRAM(s) IV Intermittent every 12 hours  metolazone 10 milliGRAM(s) Oral once  norepinephrine Infusion 0.77 MICROgram(s)/kG/Min (68.7 mL/Hr) IV Continuous <Continuous>  vancomycin  IVPB 1250 milliGRAM(s) IV Intermittent once  vasopressin Infusion 0.04 Unit(s)/Min (6 mL/Hr) IV Continuous <Continuous>    MEDICATIONS  (PRN):  HYDROmorphone  Injectable 0.5 milliGRAM(s) IV Push every 4 hours PRN Severe Pain (7 - 10)  lidocaine 2% Jelly 5 milliLiter(s) IntraUrethral every 12 hours PRN Pain at koehler site          LABS:                        8.0    0.18  )-----------( 9        ( 31 Aug 2023 04:18 )             24.3     08-31    131<L>  |  103  |  59<H>  ----------------------------<  118<H>  4.6   |  16<L>  |  4.13<H>    Ca    6.6<L>      31 Aug 2023 04:18  Phos  5.0     08-31  Mg     2.3     08-31    TPro  6.1  /  Alb  1.7<L>  /  TBili  0.9  /  DBili  x   /  AST  19  /  ALT  19  /  AlkPhos  111  08-31    PT/INR - ( 30 Aug 2023 14:21 )   PT: 15.3 sec;   INR: 1.35 ratio         PTT - ( 30 Aug 2023 14:21 )  PTT:31.0 sec  Urinalysis Basic - ( 31 Aug 2023 04:18 )    Color: x / Appearance: x / SG: x / pH: x  Gluc: 118 mg/dL / Ketone: x  / Bili: x / Urobili: x   Blood: x / Protein: x / Nitrite: x   Leuk Esterase: x / RBC: x / WBC x   Sq Epi: x / Non Sq Epi: x / Bacteria: x      Magnesium: 2.3 mg/dL (08-31 @ 04:18)  Phosphorus: 5.0 mg/dL (08-31 @ 04:18)    Urine studies  Sodium, Random Urine: 104 mmol/L (08-30 @ 12:55)  Osmolality, Random Urine: 294 mos/kg (08-30 @ 12:55)  Creatinine, Random Urine: 36 mg/dL (08-30 @ 12:55)    PTH and Vit D:

## 2023-08-31 NOTE — PROGRESS NOTE ADULT - ASSESSMENT
This is a 62 yo M with pmhx of prostate CA with mets to liver and bone, on Cabazitaxel q3 weeks, presents to the ED for generalized weakness and pain admitted to ICU for septic shock 2/2 neutropenic fever.    DX:  1. Septic Shock  2. Neutropenic fever  3. JOSE ANGEL  4. Lactic acidosis  5. Tachypnea  6. Sinus tachycardia  7. Schistocytes  8. Thrombocytopenia  9. Elevated troponins  =================== Neuro============================  Alert and oriented x 3 at base line       ================= Cardiovascular==========================  #Sinus Tachycardia  Likely in setting of fever and dehydration  s/p LR 100cc/hr    # Cardiogenic Shock   Troponin 156.7  trend trops  hypotension: on levophed and vasopressin  f/u TTE  ================- Pulm=================================  #Tachypnea  Likely 2/2 lactic acidosis  c/w bipap for now  ==================ID===================================  #Septic Shock  meropenam day1/  s/p 3L LR and 2L NS (total 5L)  Because patient w/ chronic steroid use, will give hydrocortisone 200 x1, and 50 q6hrs  hypotension: levophed and vasopressin  f/u ucx     #Neutropenic fever  With ANC of 25  meropenam day1/8  Will give 1 dose of Vanc  f/u vanc trough  Heme/Onc QMA consulted (8/31)Dr. Casper   ================= Nephro================================  #JOSE ANGEL  Cr 3.55   CT A/P Mild left hydroureteronephrosis extending to the collapsed bladder  f/u UA and Urine lytes    #Lactic Acidosis  Lactate 4.4  In setting of hypoperfusion  s/p 5L (3LR 2NS)  Trend until resolution  =================GI====================================  #Diarrhea  had 3 episodes of diarrhea in ED  Likely 2/2 chemotherapy use  F/u Stool cx, gi pcr, stool O&P, shiga toxin    ================ Heme==================================  #Neutropenia  As above    #Schistocytes. HUS vs TTP  thrombocytopenia (no active bleeding)  JOSE ANGEL     #Thrombocytopenia  (same as above)  f/u ADAMTS 13  =================Endocrine===============================  No issues    ================= Skin/Catheters============================  Peripheral IV's  Arterial line (8/30)  Parisi catheter    =================Prophylaxis =============================  SCD's  Will hold off chemical ppx due to thrombocytopenia    ==================GOC==================================  FULL CODE      This is a 62 yo M with pmhx of prostate CA with mets to liver and bone, on Cabazitaxel q3 weeks, presents to the ED for generalized weakness and pain admitted to ICU for septic shock 2/2 neutropenic fever and urosepsis    DX:  1. Septic Shock  2. Neutropenic fever  3. JOSE ANGEL  4. Lactic acidosis- now resolved  5. Tachypnea  6. Sinus tachycardia  7. Schistocytes  8. Thrombocytopenia  9. Elevated troponins  =================== Neuro============================  Alert and oriented x 2-3 at not base line ( A and O x 3)  Likely 2/2 sepsis      ================= Cardiovascular==========================  #Sinus Tachycardia  Likely in setting of sepsis and dehydration  Bolus LR 100cc/hr    # Cardiogenic Shock   Troponin trending up-678-->1000  EKG- NSR  trend trops  hypotension: on levophed 0.77 and vasopressin 0.04  TTE-EF 15-20%  Severe global left ventricular systolic  dysfunction, Grade I diastolic dysfunction  F/U cardiology Dr. Farah consulted for elevated trops and further vasopressor treatment  ================- Pulm=================================  #Tachypnea  Likely 2/2 lactic acidosis  c/w bipap for now  ==================ID===================================  #Septic Shock  meropenam day2/  Vancomycin day2 ( morning vanc trough WNL)  s/p 3L LR and 2L NS (total 5L)  Because patient w/ chronic steroid use, will give hydrocortisone 200 x1, and 50 q6hrs  hypotension: levophed and vasopressin  f/u ucx     #Neutropenic fever- now afebrile  With ANC of 25  WBC trending up- 0.05->0.11->0.18  On Fligrastim inj sc OD  meropenam day2/8  Will continue with Vanc  s/p vanc trough WNL  Heme/Onc QMA consulted (8/31)Dr. Casper   ================= Nephro================================  #JOSE ANGEL  Cr 3.55 on admission->4.13(8/31/23)  CT A/P Mild left hydroureteronephrosis extending to the collapsed bladder  Urine lytes- WNL  f/u UCx  Urology consulted-     #Lactic Acidosis now resolved  Lactate 4.4---> 1.7  In setting of hypoperfusion  s/p 5L (3LR 2NS)  Trend until resolution  =================GI====================================  #Diarrhea now resolved  had 3 episodes of diarrhea in ED  Likely 2/2 chemotherapy use  Cancel Stool cx, gi pcr, stool O&P, shiga toxin    ================ Heme==================================  #Neutropenia  As above    #Schistocytes. HUS vs TTP  thrombocytopenia (no active bleeding)  JOSE ANGEL     #Thrombocytopenia  Plt trending down-19-->9  Platelet transfusion, consent obtained from wife    f/u ADAMTS 13  =================Endocrine===============================  No issues    ================= Skin/Catheters============================  Peripheral IV's  Central line- R IJV  Arterial line R radial (8/30)  Parisi catheter    =================Prophylaxis =============================  SCD's  Will hold off chemical ppx due to thrombocytopenia    ==================GOC==================================  FULL CODE      This is a 62 yo M with pmhx of prostate CA with mets to liver and bone, on Cabazitaxel q3 weeks, presents to the ED for generalized weakness and pain admitted to ICU for septic shock 2/2 neutropenic fever and urosepsis    DX:  1. Septic Shock  2. Neutropenic fever  3. JOSE ANGEL  4. Lactic acidosis- now resolved  5. Tachypnea  6. Sinus tachycardia  7. Schistocytes  8. Thrombocytopenia  9. Elevated troponins  =================== Neuro============================  Alert and oriented x 2-3 at not base line ( A and O x 3)  Likely 2/2 sepsis      ================= Cardiovascular==========================  #Sinus Tachycardia  Likely in setting of sepsis and dehydration      # Cardiogenic Shock   Troponin trending up-678-->1000  EKG- NSR  trend trops  hypotension: on levophed 0.77 and vasopressin 0.04  TTE-EF 15-20%  Severe global left ventricular systolic  dysfunction, Grade I diastolic dysfunction  F/U cardiology Dr. Farah consulted for elevated trops and further vasopressor treatment  ================- Pulm=================================  #Tachypnea  Likely 2/2 lactic acidosis  c/w bipap for PRN  ==================ID===================================  #Septic Shock  meropenam day 2  s/p 3L LR and 2L NS (total 5L)  Because patient w/ chronic steroid use, will give hydrocortisone 200 x1, and 50 q6hrs  hypotension: levophed and vasopressin  f/u ucx   Infectious Dx Paul following    #Neutropenic fever- now afebrile  With ANC of 25  WBC trending up- 0.05->0.11->0.18  On Fligrastim inj sc OD  meropenam day2/8  s/p vanc trough WNL  Heme/Onc QMA consulted (8/31)Dr. Casper   ================= Nephro================================  #JOSE ANGEL  Cr 3.55 on admission->4.13(8/31/23)  CT A/P Mild left hydroureteronephrosis extending to the collapsed bladder  Urine lytes- WNL  f/u UCx  Urology consulted- Dr. Goldsmith    #Lactic Acidosis now resolved  Lactate 4.4---> 1.7  In setting of hypoperfusion  s/p 5L (3LR 2NS)    =================GI====================================  #Diarrhea now resolved  had 3 episodes of diarrhea in ED, no further episodes since ED  Likely 2/2 chemotherapy use  Cancel Stool cx, gi pcr, stool O&P, shiga toxin    ================ Heme==================================  #Neutropenia  As above    #Schistocytes. HUS vs TTP  thrombocytopenia (no active bleeding)  JOSE ANGEL     #Thrombocytopenia  Plt trending down-19-->9  Platelet transfusion, consent obtained from wife    f/u ADAMTS 13  =================Endocrine===============================  No issues    ================= Skin/Catheters============================  Peripheral IV's  Central line- R IJV (8/30)  Arterial line R radial (8/30)  Parisi catheter    =================Prophylaxis =============================  SCD's  Will hold off chemical ppx due to thrombocytopenia    ==================GOC==================================  FULL CODE - To reopen GOC discussion today

## 2023-09-01 DIAGNOSIS — R57.0 CARDIOGENIC SHOCK: ICD-10-CM

## 2023-09-01 DIAGNOSIS — Z51.5 ENCOUNTER FOR PALLIATIVE CARE: ICD-10-CM

## 2023-09-01 DIAGNOSIS — E43 UNSPECIFIED SEVERE PROTEIN-CALORIE MALNUTRITION: ICD-10-CM

## 2023-09-01 DIAGNOSIS — C61 MALIGNANT NEOPLASM OF PROSTATE: ICD-10-CM

## 2023-09-01 LAB
ANION GAP SERPL CALC-SCNC: 15 MMOL/L — SIGNIFICANT CHANGE UP (ref 5–17)
ANISOCYTOSIS BLD QL: SLIGHT — SIGNIFICANT CHANGE UP
BASOPHILS # BLD AUTO: 0 K/UL — SIGNIFICANT CHANGE UP (ref 0–0.2)
BASOPHILS NFR BLD AUTO: 0 % — SIGNIFICANT CHANGE UP (ref 0–2)
BUN SERPL-MCNC: 72 MG/DL — HIGH (ref 7–18)
CALCIUM SERPL-MCNC: 6.5 MG/DL — CRITICAL LOW (ref 8.4–10.5)
CHLORIDE SERPL-SCNC: 104 MMOL/L — SIGNIFICANT CHANGE UP (ref 96–108)
CO2 SERPL-SCNC: 15 MMOL/L — LOW (ref 22–31)
CREAT SERPL-MCNC: 3.96 MG/DL — HIGH (ref 0.5–1.3)
DACRYOCYTES BLD QL SMEAR: SLIGHT — SIGNIFICANT CHANGE UP
EGFR: 16 ML/MIN/1.73M2 — LOW
EOSINOPHIL # BLD AUTO: 0 K/UL — SIGNIFICANT CHANGE UP (ref 0–0.5)
EOSINOPHIL NFR BLD AUTO: 0 % — SIGNIFICANT CHANGE UP (ref 0–6)
GLUCOSE SERPL-MCNC: 134 MG/DL — HIGH (ref 70–99)
HCT VFR BLD CALC: 21.1 % — LOW (ref 39–50)
HGB BLD-MCNC: 7.1 G/DL — LOW (ref 13–17)
LYMPHOCYTES # BLD AUTO: 0.11 K/UL — LOW (ref 1–3.3)
LYMPHOCYTES # BLD AUTO: 40 % — SIGNIFICANT CHANGE UP (ref 13–44)
MAGNESIUM SERPL-MCNC: 2.3 MG/DL — SIGNIFICANT CHANGE UP (ref 1.6–2.6)
MANUAL SMEAR VERIFICATION: SIGNIFICANT CHANGE UP
MCHC RBC-ENTMCNC: 31.6 PG — SIGNIFICANT CHANGE UP (ref 27–34)
MCHC RBC-ENTMCNC: 33.6 GM/DL — SIGNIFICANT CHANGE UP (ref 32–36)
MCV RBC AUTO: 93.8 FL — SIGNIFICANT CHANGE UP (ref 80–100)
MONOCYTES # BLD AUTO: 0.05 K/UL — SIGNIFICANT CHANGE UP (ref 0–0.9)
MONOCYTES NFR BLD AUTO: 20 % — HIGH (ref 2–14)
NEUTROPHILS # BLD AUTO: 0.11 K/UL — LOW (ref 1.8–7.4)
NEUTROPHILS NFR BLD AUTO: 40 % — LOW (ref 43–77)
NRBC # BLD: 0 /100 — SIGNIFICANT CHANGE UP (ref 0–0)
PHOSPHATE SERPL-MCNC: 5.1 MG/DL — HIGH (ref 2.5–4.5)
PLAT MORPH BLD: NORMAL — SIGNIFICANT CHANGE UP
PLATELET # BLD AUTO: 9 K/UL — CRITICAL LOW (ref 150–400)
PLATELET COUNT - ESTIMATE: ABNORMAL
POIKILOCYTOSIS BLD QL AUTO: SLIGHT — SIGNIFICANT CHANGE UP
POTASSIUM SERPL-MCNC: 4.4 MMOL/L — SIGNIFICANT CHANGE UP (ref 3.5–5.3)
POTASSIUM SERPL-SCNC: 4.4 MMOL/L — SIGNIFICANT CHANGE UP (ref 3.5–5.3)
RBC # BLD: 2.25 M/UL — LOW (ref 4.2–5.8)
RBC # FLD: 18.8 % — HIGH (ref 10.3–14.5)
RBC BLD AUTO: ABNORMAL
SODIUM SERPL-SCNC: 134 MMOL/L — LOW (ref 135–145)
WBC # BLD: 0.27 K/UL — CRITICAL LOW (ref 3.8–10.5)
WBC # FLD AUTO: 0.27 K/UL — CRITICAL LOW (ref 3.8–10.5)

## 2023-09-01 PROCEDURE — 99231 SBSQ HOSP IP/OBS SF/LOW 25: CPT

## 2023-09-01 PROCEDURE — 99497 ADVNCD CARE PLAN 30 MIN: CPT | Mod: 25

## 2023-09-01 RX ORDER — OLANZAPINE 15 MG/1
2.5 TABLET, FILM COATED ORAL ONCE
Refills: 0 | Status: COMPLETED | OUTPATIENT
Start: 2023-09-01 | End: 2023-09-01

## 2023-09-01 RX ORDER — HYDROMORPHONE HYDROCHLORIDE 2 MG/ML
1 INJECTION INTRAMUSCULAR; INTRAVENOUS; SUBCUTANEOUS EVERY 4 HOURS
Refills: 0 | Status: DISCONTINUED | OUTPATIENT
Start: 2023-09-01 | End: 2023-09-05

## 2023-09-01 RX ORDER — NYSTATIN 500MM UNIT
500000 POWDER (EA) MISCELLANEOUS
Refills: 0 | Status: COMPLETED | OUTPATIENT
Start: 2023-09-01 | End: 2023-09-04

## 2023-09-01 RX ADMIN — Medication 50 MILLIGRAM(S): at 12:04

## 2023-09-01 RX ADMIN — Medication 50 MILLIGRAM(S): at 23:23

## 2023-09-01 RX ADMIN — CHLORHEXIDINE GLUCONATE 1 APPLICATION(S): 213 SOLUTION TOPICAL at 12:05

## 2023-09-01 RX ADMIN — HYDROMORPHONE HYDROCHLORIDE 1 MILLIGRAM(S): 2 INJECTION INTRAMUSCULAR; INTRAVENOUS; SUBCUTANEOUS at 19:22

## 2023-09-01 RX ADMIN — HYDROMORPHONE HYDROCHLORIDE 1 MILLIGRAM(S): 2 INJECTION INTRAMUSCULAR; INTRAVENOUS; SUBCUTANEOUS at 17:46

## 2023-09-01 RX ADMIN — MEROPENEM 100 MILLIGRAM(S): 1 INJECTION INTRAVENOUS at 05:21

## 2023-09-01 RX ADMIN — Medication 50 MILLIGRAM(S): at 17:28

## 2023-09-01 RX ADMIN — Medication 50 MILLIGRAM(S): at 00:14

## 2023-09-01 RX ADMIN — Medication 480 MICROGRAM(S): at 12:04

## 2023-09-01 RX ADMIN — Medication 50 MILLIGRAM(S): at 05:22

## 2023-09-01 RX ADMIN — MEROPENEM 100 MILLIGRAM(S): 1 INJECTION INTRAVENOUS at 17:46

## 2023-09-01 NOTE — CONSULT NOTE ADULT - CONVERSATION DETAILS
Met with the pt's and sisters at the bedside to discuss the GOC, pt states that he defers all GOC decisions to his wife Radha. Call placed to Radha and we reviewed the GOC at the bedside with Radha on speakerphone. Per Radha, she has discussed with the pt's oncology team at [facility] and she is aware he is no longer a candidate for cancer-directed treatments. Discussed the hospital course, he remains neutropenic and EF 15%, and the overall prognosis is poor. Discussed end of life issues and educated regarding hospice and locations of care. She feels that the pt will require increasing support, as he was fairly independent PTA and is interested in possible LTC placement. She is agreeable to hospice, this was broached by the [facility] team. Educated regarding MOLST form and pt states he defers to Radha, and MOLST was drafted with DNR/I by ICU team, they decline to complete rest of form at this time. Much support provided.

## 2023-09-01 NOTE — CONSULT NOTE ADULT - SUBJECTIVE AND OBJECTIVE BOX
Cardiology  HISTORY OF PRESENT ILLNESS: HPI:  This is a 64 yo M with pmhx of prostate CA with mets to liver and bone, on Cabazitaxel q3 weeks, presents to the ED for generalized weakness and pain. Per wife, who is at bedside, states patient had an episode of loss of consciousness and fall, with head trauma. Patient also mentions having dysuria since yesterday. patient started this regimen in march, and this is the first time he has had such symptoms. He follows up with Oncologist Dr. Nasir Gondal. He recieved his last chemo treatment 1 week ago.  (30 Aug 2023 01:34)    Mr Carrasco is a pleasant 63yoM here with low blood pressure and painful urination.  He was found to have neutropenic fever/sepsis after receiving chemo recently.  He is aware of being pancytopenic.  He is not aware of diagnosis of systolic heart failure.  He is referred to cardiology for assistance in management of shock state.  On arrival, had elevated lactate level, and poor urine output.  Was advised that his echocardiogram showed a LVEF of 15% without LV dilation.  It is unclear of his CHF chronicity, however lack of LV dilation was considered reassuring.  Gave advice by telephone to assess his RA pressure / Cardiac Output / SVR- informed he was on the FloTrack device, and to use SVR to titrate pressors +/- add inotropes.  Today, he states he feels "much better, like I'm ready to go home!".  No angina or palpitations.  A 10 pt ROS is otherwise negative.    PAST MEDICAL & SURGICAL HISTORY:  CA of prostate  Enlarged prostate with lower urinary tract symptoms (LUTS)  No significant past surgical history    MEDICATIONS  (STANDING):  chlorhexidine 2% Cloths 1 Application(s) Topical daily  DOBUTamine Infusion 1.5 MICROgram(s)/kG/Min (4.28 mL/Hr) IV Continuous <Continuous>  fentaNYL   Patch  25 MICROgram(s)/Hr 1 Patch Transdermal every 72 hours  filgrastim-sndz (ZARXIO) Injectable 480 MICROGram(s) SubCutaneous daily  hydrocortisone sodium succinate Injectable 50 milliGRAM(s) IV Push every 6 hours  melatonin 3 milliGRAM(s) Oral at bedtime  meropenem  IVPB      meropenem  IVPB 500 milliGRAM(s) IV Intermittent every 12 hours  norepinephrine Infusion 0.77 MICROgram(s)/kG/Min (68.7 mL/Hr) IV Continuous <Continuous>  vasopressin Infusion 0.04 Unit(s)/Min (6 mL/Hr) IV Continuous <Continuous>    Allergies    No Known Allergies    Intolerances    FAMILY HISTORY:  Family history of hypertension (Mother)      Non-contributary for premature coronary disease or sudden cardiac death    SOCIAL HISTORY:    [x ] Non-smoker  [ ] Smoker  [ ] Alcohol    PHYSICAL EXAM:  T(C): 36.2 (09-01-23 @ 11:00), Max: 36.4 (08-31-23 @ 21:49)  HR: 109 (09-01-23 @ 12:55) (94 - 112)  BP: --  RR: 20 (09-01-23 @ 12:55) (10 - 29)  SpO2: 99% (09-01-23 @ 12:55) (98% - 100%)  Wt(kg): --    Appearance: Normal appearing adult male in no acute distress  HEENT:   Normal oral mucosa, PERRL, EOMI	  Lymphatic: No lymphadenopathy , no edema  Cardiovascular: Normal S1 S2, No JVD, No murmurs , Peripheral pulses palpable 2+ bilaterally. Right IJ central line.  Respiratory: Lungs clear to auscultation, normal effort 	  Gastrointestinal:  Soft, mildly tender + BS	  Skin: No rashes, No ecchymoses, No cyanosis, cool to touch  Musculoskeletal: Normal range of motion, normal strength  Psychiatry:  Mood & affect appropriate      TELEMETRY: sinus tachycardia, short burst of NSVT	    ECG: sinus rhythm. 	    Echo:  DIMENSIONS:  Dimensions:     Normal Values:  LA:     3.9 cm    2.0 - 4.0 cm  Ao:     3.6 cm    2.0 - 3.8 cm  SEPTUM: 1.1 cm    0.6 - 1.2 cm  PWT:    0.8 cm    0.6 - 1.1 cm  LVIDd:  4.8 cm    3.0 - 5.6 cm  LVIDs:  4.4 cm    1.8 - 4.0 cm      Derived Variables:  LVMI: 76 g/m2  RWT: 0.33  Ejection Fraction Visual Estimate: 15-20 %  Ejection Fraction Reid: 20 %    ------------------------------------------------------------------------  OBSERVATIONS:  Mitral Valve: Normal mitral valve. Mild mitral  regurgitation.  Aortic Root: Aortic Root: 3.6 cm.    Aortic Valve: Normal trileaflet aortic valve.  Left Atrium: Normal left atrium.  LA volume index = 21  cc/m2.  Left Ventricle: Severe global left ventricular systolic  dysfunction. Ultrasound LV opacification agent was used to  enhance endocardial definition. Severe global hypokinesia.  Normal left ventricular internal dimensions and wall  thicknesses. Grade I diastolic dysfunction (Impaired  relaxation, mild).  Right Heart: Normal right atrium. Normal right ventricular  size and systolic function (TAPSE 1.9 cm). There is mild  tricuspid regurgitation. There is mild pulmonic  regurgitation.  Pericardium/PleuraNormal pericardium with no pericardial  effusion.  Hemodynamic: RV systolic pressure is normal at  33 mm Hg.    	  LABS:	 	                          7.1    0.27  )-----------( 9        ( 01 Sep 2023 04:57 )             21.1     09-01    134<L>  |  104  |  72<H>  ----------------------------<  134<H>  4.4   |  15<L>  |  3.96<H>    Ca    6.5<LL>      01 Sep 2023 04:57  Phos  5.1     09-01  Mg     2.3     09-01    TPro  6.0  /  Alb  1.7<L>  /  TBili  0.8  /  DBili  x   /  AST  19  /  ALT  18  /  AlkPhos  104  08-31    ASSESSMENT/PLAN: Mr Carrasco is a pleasant 63y Male here with neutropenic fever and mixed shock picture: septic + cardiogenic.  He has newly diagnosed congestive heart failure, with severe cardiomyopathy, EF 15-20%, LV nondilated.  He has acute on chronic CKD- his baseline Creat was 1, last year.  This admission, Creat 3.5-4.  Unknown interim values.  He has a right IJ CVL,without complications.  He is breathing room air spontaneously and looks comfortable.  FloTrack adjusted re: calibration of the CVP.  Use a level to adjust the CVP to the level of the mid-chest / mid-axillary line.  At time of evaluation, CVP 6-8, CO 7LPM, CI 3, -880.    He is stable/compensated on NorEpi ~0.2, and Dobutamine 2.5  Perhaps in 24hrs, consider decreasing Dobutamine to 1.5.  He remains a bit vasodilated, and his CO remains elevated in the setting of septic shock.  Continue broad spectrum antibiotics per ICU plan of care.  Will help adjust CHF GDMT once he is out of shock state.  Maintain K 4-4.5, Mg 2 to prevent arrhythmia.  From a CV perspective, he is high risk for invasive procedures, with new Dx of CHF.  He is stabilized on inotropes/pressors.  He has a very low platelet count, which increases the likelihood of serious bleeding during solid-organ puncture.    Long term - Denosumab can cause CHF.  Erleada and Denosumab can also accelerate/destablize coronary artery disease.  Should adjust his Onc plan accordingly.  Palliative care weighing in on prognosis and symptom management.    Carlitos Farah M.D.  Cardiac Electrophysiology    office [phone]  pager [phone]

## 2023-09-01 NOTE — PROGRESS NOTE ADULT - ASSESSMENT
62yo M with metastatic prostate cancer which is also locally advanced invading the bladder found to have mild L hydroureter in the setting of JOSE ANGEL. Patients JOSE ANGEL is likely all secondary to shock and ATN but could also have an element of post-renal given L hydroureter. Obstruction from hydroureter is likely 2/2 locally advanced disease causing malignant obstruction.    Recommendations  - Repeat RBUS -> RBUS shows persistence of hydronephrosis but as mentioned below it is unlikely to be contributing significantly to patients JOSE ANGEL and in speaking with family they would not like any invasive procedures especially if they would have minimal benefit. In agreement so do not think patient has indication for PCN at this time.   - If hydronephrosis is still evidence in L kidney would consider placing L PCN for hydronephrosis 2/2 malignant obstruction. Should have discussion with nephrology regarding how much of patients JOSE ANGEL is 2/2 hydronephrosis given that it is mild on imaging and he has more prominent reasons for having an JOSE ANGEL.   - Discussed with Dr. Goldsmith

## 2023-09-01 NOTE — CONSULT NOTE ADULT - SUBJECTIVE AND OBJECTIVE BOX
Consult to: Discuss complex medical decision making related to goals of care    [facility] Geriatric and Palliative Consult Service:  Niki Lilly DO: cell ([phone])  Sixto Moscoso MD: cell ([phone])  Imtiaz George NP: cell ([phone])   Lul Reed LMSW: cell ([phone])   Jeanne Amaral NP: via [email or web address] Teams    Can contact any Palliative Team member via [email or web address] Teams for consults and questions      HPI:  This is a 64 yo M with pmhx of prostate CA with mets to liver and bone, on Cabazitaxel q3 weeks, presents to the ED for generalized weakness and pain. Per wife, who is at bedside, states patient had an episode of loss of consciousness and fall, with head trauma. Patient also mentions having dysuria since yesterday. patient started this regimen in march, and this is the first time he has had such symptoms. He follows up with Oncologist Dr. Nasir Gondal. He recieved his last chemo treatment 1 week ago.  (30 Aug 2023 01:34)    Interval History: Seen at the bedside, A/Ox3, tolerating RA, denies pain/dyspnea.      PAST MEDICAL & SURGICAL HISTORY:  CA of prostate      Enlarged prostate with lower urinary tract symptoms (LUTS)      No significant past surgical history          SOCIAL HISTORY:    Admitted from:  home  (with HHA)           assisted living          [facility]       LT   [ none ] Substance abuse, [ none ] Tobacco hx, [ none ] Alcohol hx, [ none ] Home Opioid Hx    FAMILY HISTORY:  Family history of hypertension (Mother)     unable to obtain from patient due to poor mentation, family unable to give information, see H&P for history  Baseline ADLs (prior to admission):    Allergies    No Known Allergies    Intolerances      Present Symptoms: Mild, Moderate, Severe  Pain:             Location -                               Aggravating factors -             Quality -             Radiation -             Timing-             Severity (0-10 scale):             Minimal acceptable level (0-10 scale):  Fatigue:  Nausea:  Lack of Appetite:   SOB:  Depression:  Anxiety:  Review of Systems: [All others negative or Unable to obtain due to poor mentation]    CPOT:    https://www.[facility].org/getattachment/[email or web address]/Critical-Care-Pain-Observation-Tool-(CPOT)  PAIN AD Score:   [email or web address] (press ctrl +  left click to view)      MEDICATIONS  (STANDING):  chlorhexidine 2% Cloths 1 Application(s) Topical daily  DOBUTamine Infusion 2.5 MICROgram(s)/kG/Min (7.14 mL/Hr) IV Continuous <Continuous>  fentaNYL   Patch  25 MICROgram(s)/Hr 1 Patch Transdermal every 72 hours  filgrastim-sndz (ZARXIO) Injectable 480 MICROGram(s) SubCutaneous daily  hydrocortisone sodium succinate Injectable 50 milliGRAM(s) IV Push every 6 hours  melatonin 3 milliGRAM(s) Oral at bedtime  meropenem  IVPB      meropenem  IVPB 500 milliGRAM(s) IV Intermittent every 12 hours  norepinephrine Infusion 0.77 MICROgram(s)/kG/Min (68.7 mL/Hr) IV Continuous <Continuous>  vasopressin Infusion 0.04 Unit(s)/Min (6 mL/Hr) IV Continuous <Continuous>    MEDICATIONS  (PRN):  calamine/zinc oxide Lotion 1 Application(s) Topical two times a day PRN Rash and/or Itching  HYDROmorphone  Injectable 1 milliGRAM(s) IV Push every 4 hours PRN Pain  lidocaine 2% Jelly 5 milliLiter(s) IntraUrethral every 12 hours PRN Pain at koehler site      PHYSICAL EXAM:  Vital Signs Last 24 Hrs  T(C): 36.1 (01 Sep 2023 04:01), Max: 36.4 (31 Aug 2023 21:49)  T(F): 96.9 (01 Sep 2023 04:01), Max: 97.6 (31 Aug 2023 21:49)  HR: 102 (01 Sep 2023 09:15) (94 - 109)  BP: --  BP(mean): --  RR: 25 (01 Sep 2023 09:15) (10 - 32)  SpO2: 100% (01 Sep 2023 09:15) (98% - 100%)    Parameters below as of 31 Aug 2023 19:00  Patient On (Oxygen Delivery Method): nasal cannula  O2 Flow (L/min): 4      General: alert  oriented x 3 chronically ill appearing frail  in NARD    Palliative Performance Scale/Karnofsky Score: 40%  http://npcrc.org/files/news/palliative_performance_scale_ppsv2.pdf    HEENT: no abnormal lesion, dry mouth  +RIJ catheter  Lungs: nonlabor in RA  CV: RRR, S1S2  GI: soft non distended non tender  incontinent  :  koehler  Musculoskeletal: weakness x4 no edema x4    ambulatory with assistance     Skin: no abnormal skin lesions, poor skin turgor, pressure ulcer stage:   Neuro: no deficits  Oral intake ability:  full capability    LABS:                        7.1    0.27  )-----------( 9        ( 01 Sep 2023 04:57 )             21.1     09-01    134<L>  |  104  |  72<H>  ----------------------------<  134<H>  4.4   |  15<L>  |  3.96<H>    Ca    6.5<LL>      01 Sep 2023 04:57  Phos  5.1     09-01  Mg     2.3     09-01    TPro  6.0  /  Alb  1.7<L>  /  TBili  0.8  /  DBili  x   /  AST  19  /  ALT  18  /  AlkPhos  104  08-31    Urinalysis Basic - ( 01 Sep 2023 04:57 )    Color: x / Appearance: x / SG: x / pH: x  Gluc: 134 mg/dL / Ketone: x  / Bili: x / Urobili: x   Blood: x / Protein: x / Nitrite: x   Leuk Esterase: x / RBC: x / WBC x   Sq Epi: x / Non Sq Epi: x / Bacteria: x        RADIOLOGY & ADDITIONAL STUDIES:     Consult to: Discuss complex medical decision making related to goals of care    [facility] Geriatric and Palliative Consult Service:  Niki Lilly DO: cell ([phone])  Sixto Moscoso MD: cell ([phone])  Imtiaz George NP: cell ([phone])   Lul Reed LMSW: cell ([phone])   Jeanne Amaral NP: via [employer] Teams    Can contact any Palliative Team member via [employer] Teams for consults and questions      HPI:  This is a 62 yo M with pmhx of prostate CA with mets to liver and bone, on Cabazitaxel q3 weeks, presents to the ED for generalized weakness and pain. Per wife, who is at bedside, states patient had an episode of loss of consciousness and fall, with head trauma. Patient also mentions having dysuria since yesterday. patient started this regimen in march, and this is the first time he has had such symptoms. He follows up with Oncologist Dr. Nasir Gondal. He recieved his last chemo treatment 1 week ago.  (30 Aug 2023 01:34)    Interval History: Seen at the bedside, A/Ox3, tolerating RA, denies pain/dyspnea.      PAST MEDICAL & SURGICAL HISTORY:  CA of prostate      Enlarged prostate with lower urinary tract symptoms (LUTS)      No significant past surgical history          SOCIAL HISTORY:    Admitted from:  home  (with HHA)           assisted living          [facility]       LT   [ none ] Substance abuse, [ none ] Tobacco hx, [ none ] Alcohol hx, [ none ] Home Opioid Hx    FAMILY HISTORY:  Family history of hypertension (Mother)     unable to obtain from patient due to poor mentation, family unable to give information, see H&P for history  Baseline ADLs (prior to admission):    Allergies    No Known Allergies    Intolerances      Present Symptoms: Mild, Moderate, Severe  Pain:             Location -                               Aggravating factors -             Quality -             Radiation -             Timing-             Severity (0-10 scale):             Minimal acceptable level (0-10 scale):  Fatigue:  Nausea:  Lack of Appetite:   SOB:  Depression:  Anxiety:  Review of Systems: [All others negative or Unable to obtain due to poor mentation]    CPOT:    https://www.[facility].org/getattachment/[email or web address]/Critical-Care-Pain-Observation-Tool-(CPOT)  PAIN AD Score:   [email or web address]://[email or web address]/[email or web address].pdf (press ctrl +  left click to view)      MEDICATIONS  (STANDING):  chlorhexidine 2% Cloths 1 Application(s) Topical daily  DOBUTamine Infusion 2.5 MICROgram(s)/kG/Min (7.14 mL/Hr) IV Continuous <Continuous>  fentaNYL   Patch  25 MICROgram(s)/Hr 1 Patch Transdermal every 72 hours  filgrastim-sndz (ZARXIO) Injectable 480 MICROGram(s) SubCutaneous daily  hydrocortisone sodium succinate Injectable 50 milliGRAM(s) IV Push every 6 hours  melatonin 3 milliGRAM(s) Oral at bedtime  meropenem  IVPB      meropenem  IVPB 500 milliGRAM(s) IV Intermittent every 12 hours  norepinephrine Infusion 0.77 MICROgram(s)/kG/Min (68.7 mL/Hr) IV Continuous <Continuous>  vasopressin Infusion 0.04 Unit(s)/Min (6 mL/Hr) IV Continuous <Continuous>    MEDICATIONS  (PRN):  calamine/zinc oxide Lotion 1 Application(s) Topical two times a day PRN Rash and/or Itching  HYDROmorphone  Injectable 1 milliGRAM(s) IV Push every 4 hours PRN Pain  lidocaine 2% Jelly 5 milliLiter(s) IntraUrethral every 12 hours PRN Pain at koehler site      PHYSICAL EXAM:  Vital Signs Last 24 Hrs  T(C): 36.1 (01 Sep 2023 04:01), Max: 36.4 (31 Aug 2023 21:49)  T(F): 96.9 (01 Sep 2023 04:01), Max: 97.6 (31 Aug 2023 21:49)  HR: 102 (01 Sep 2023 09:15) (94 - 109)  BP: --  BP(mean): --  RR: 25 (01 Sep 2023 09:15) (10 - 32)  SpO2: 100% (01 Sep 2023 09:15) (98% - 100%)    Parameters below as of 31 Aug 2023 19:00  Patient On (Oxygen Delivery Method): nasal cannula  O2 Flow (L/min): 4      General: alert  oriented x 3 chronically ill appearing frail  in NARD    Palliative Performance Scale/Karnofsky Score: 40%  http://npcrc.org/files/news/palliative_performance_scale_ppsv2.pdf    HEENT: no abnormal lesion, dry mouth  +RIJ catheter  Lungs: nonlabor in RA  CV: RRR, S1S2  GI: soft non distended non tender  incontinent  :  koehler  Musculoskeletal: weakness x4 no edema x4    ambulatory with assistance     Skin: no abnormal skin lesions, poor skin turgor, pressure ulcer stage:   Neuro: no deficits  Oral intake ability:  full capability    LABS:                        7.1    0.27  )-----------( 9        ( 01 Sep 2023 04:57 )             21.1     09-01    134<L>  |  104  |  72<H>  ----------------------------<  134<H>  4.4   |  15<L>  |  3.96<H>    Ca    6.5<LL>      01 Sep 2023 04:57  Phos  5.1     09-01  Mg     2.3     09-01    TPro  6.0  /  Alb  1.7<L>  /  TBili  0.8  /  DBili  x   /  AST  19  /  ALT  18  /  AlkPhos  104  08-31    Urinalysis Basic - ( 01 Sep 2023 04:57 )    Color: x / Appearance: x / SG: x / pH: x  Gluc: 134 mg/dL / Ketone: x  / Bili: x / Urobili: x   Blood: x / Protein: x / Nitrite: x   Leuk Esterase: x / RBC: x / WBC x   Sq Epi: x / Non Sq Epi: x / Bacteria: x        RADIOLOGY & ADDITIONAL STUDIES:     Consult to: Discuss complex medical decision making related to goals of care    [facility] Geriatric and Palliative Consult Service:  Niki Carr DO: cell ([phone])  Sixto Moscoso MD: cell ([phone])  Imtiaz George NP: cell ([phone])   Lul Reed SW: cell ([phone])   Jeanne Amaral NP: via [employer] Teams    Can contact any Palliative Team member via [employer] Teams for consults and questions      HPI:  This is a 64 yo M with pmhx of prostate CA with mets to liver and bone, on Cabazitaxel q3 weeks, presents to the ED for generalized weakness and pain. Per wife, who is at bedside, states patient had an episode of loss of consciousness and fall, with head trauma. Patient also mentions having dysuria since yesterday. patient started this regimen in march, and this is the first time he has had such symptoms. He follows up with Oncologist Dr. Nasir Gondal. He recieved his last chemo treatment 1 week ago.  (30 Aug 2023 01:34)    Interval History: Seen at the bedside, A/Ox2, tolerating RA, denies pain/dyspnea.      PAST MEDICAL & SURGICAL HISTORY:  CA of prostate      Enlarged prostate with lower urinary tract symptoms (LUTS)      No significant past surgical history          SOCIAL HISTORY:    Admitted from:  home    [ none ] Substance abuse, [ none ] Tobacco hx, [ none ] Alcohol hx, [ none ] Home Opioid Hx    FAMILY HISTORY:  Family history of hypertension (Mother)     unable to obtain from patient due to poor mentation, family unable to give information, see H&P for history  Baseline ADLs (prior to admission): independent    Allergies    No Known Allergies    Intolerances      Present Symptoms: Mild, Moderate, Severe  Pain: denies             Location -                               Aggravating factors -             Quality -             Radiation -             Timing-             Severity (0-10 scale):             Minimal acceptable level (0-10 scale):  Fatigue: denies  Nausea: denies  Lack of Appetite: denies  SOB: denies  Depression: denies  Anxiety: denies  Review of Systems: All others negative    CPOT:    https://www.sccm.org/getattachment/[email or web address]/Critical-Care-Pain-Observation-Tool-(CPOT)  PAIN AD Score:   [email or web address]://[email or web address]/[email or web address] (press ctrl +  left click to view)      MEDICATIONS  (STANDING):  chlorhexidine 2% Cloths 1 Application(s) Topical daily  DOBUTamine Infusion 2.5 MICROgram(s)/kG/Min (7.14 mL/Hr) IV Continuous <Continuous>  fentaNYL   Patch  25 MICROgram(s)/Hr 1 Patch Transdermal every 72 hours  filgrastim-sndz (ZARXIO) Injectable 480 MICROGram(s) SubCutaneous daily  hydrocortisone sodium succinate Injectable 50 milliGRAM(s) IV Push every 6 hours  melatonin 3 milliGRAM(s) Oral at bedtime  meropenem  IVPB      meropenem  IVPB 500 milliGRAM(s) IV Intermittent every 12 hours  norepinephrine Infusion 0.77 MICROgram(s)/kG/Min (68.7 mL/Hr) IV Continuous <Continuous>  vasopressin Infusion 0.04 Unit(s)/Min (6 mL/Hr) IV Continuous <Continuous>    MEDICATIONS  (PRN):  calamine/zinc oxide Lotion 1 Application(s) Topical two times a day PRN Rash and/or Itching  HYDROmorphone  Injectable 1 milliGRAM(s) IV Push every 4 hours PRN Pain  lidocaine 2% Jelly 5 milliLiter(s) IntraUrethral every 12 hours PRN Pain at koehler site      PHYSICAL EXAM:  Vital Signs Last 24 Hrs  T(C): 36.1 (01 Sep 2023 04:01), Max: 36.4 (31 Aug 2023 21:49)  T(F): 96.9 (01 Sep 2023 04:01), Max: 97.6 (31 Aug 2023 21:49)  HR: 102 (01 Sep 2023 09:15) (94 - 109)  BP: --  BP(mean): --  RR: 25 (01 Sep 2023 09:15) (10 - 32)  SpO2: 100% (01 Sep 2023 09:15) (98% - 100%)    Parameters below as of 31 Aug 2023 19:00  Patient On (Oxygen Delivery Method): nasal cannula  O2 Flow (L/min): 4      General: alert  oriented x 2 with confusion chronically ill appearing frail  in NARD    Palliative Performance Scale/Karnofsky Score: 40%  http://npcrc.org/files/news/palliative_performance_scale_ppsv2.pdf  ECO    HEENT: no abnormal lesion, dry mouth  +RIJ catheter  Lungs: nonlabor in RA  CV: RRR, S1S2  GI: soft non distended non tender  incontinent  :  koehler  Musculoskeletal: weakness x4 no edema x4    ambulatory with assistance     Skin: no abnormal skin lesions, poor skin turgor  Neuro: no deficits  Oral intake ability:  full capability    LABS:                        7.1    0.27  )-----------( 9        ( 01 Sep 2023 04:57 )             21.1     09-    134<L>  |  104  |  72<H>  ----------------------------<  134<H>  4.4   |  15<L>  |  3.96<H>    Ca    6.5<LL>      01 Sep 2023 04:57  Phos  5.1       Mg     2.3     -    TPro  6.0  /  Alb  1.7<L>  /  TBili  0.8  /  DBili  x   /  AST  19  /  ALT  18  /  AlkPhos  104  08-31    Urinalysis Basic - ( 01 Sep 2023 04:57 )    Color: x / Appearance: x / SG: x / pH: x  Gluc: 134 mg/dL / Ketone: x  / Bili: x / Urobili: x   Blood: x / Protein: x / Nitrite: x   Leuk Esterase: x / RBC: x / WBC x   Sq Epi: x / Non Sq Epi: x / Bacteria: x        RADIOLOGY & ADDITIONAL STUDIES:

## 2023-09-01 NOTE — PROGRESS NOTE ADULT - SUBJECTIVE AND OBJECTIVE BOX
INTERVAL HPI/OVERNIGHT EVENTS:       PRESSORS: [ ] YES [ ] NO  WHICH:    ANTIBIOTICS:                  DATE STARTED:  ANTIBIOTICS:                  DATE STARTED:    Antimicrobial:  meropenem  IVPB      meropenem  IVPB 500 milliGRAM(s) IV Intermittent every 12 hours    Cardiovascular:  DOBUTamine Infusion 2.5 MICROgram(s)/kG/Min IV Continuous <Continuous>  norepinephrine Infusion 0.77 MICROgram(s)/kG/Min IV Continuous <Continuous>    Pulmonary:    Hematalogic:    Other:  calamine/zinc oxide Lotion 1 Application(s) Topical two times a day PRN  chlorhexidine 2% Cloths 1 Application(s) Topical daily  filgrastim-sndz (ZARXIO) Injectable 480 MICROGram(s) SubCutaneous daily  hydrocortisone sodium succinate Injectable 50 milliGRAM(s) IV Push every 6 hours  HYDROmorphone  Injectable 0.5 milliGRAM(s) IV Push every 4 hours PRN  lidocaine 2% Jelly 5 milliLiter(s) IntraUrethral every 12 hours PRN  melatonin 3 milliGRAM(s) Oral at bedtime  vasopressin Infusion 0.04 Unit(s)/Min IV Continuous <Continuous>    calamine/zinc oxide Lotion 1 Application(s) Topical two times a day PRN  chlorhexidine 2% Cloths 1 Application(s) Topical daily  DOBUTamine Infusion 2.5 MICROgram(s)/kG/Min IV Continuous <Continuous>  filgrastim-sndz (ZARXIO) Injectable 480 MICROGram(s) SubCutaneous daily  hydrocortisone sodium succinate Injectable 50 milliGRAM(s) IV Push every 6 hours  HYDROmorphone  Injectable 0.5 milliGRAM(s) IV Push every 4 hours PRN  lidocaine 2% Jelly 5 milliLiter(s) IntraUrethral every 12 hours PRN  melatonin 3 milliGRAM(s) Oral at bedtime  meropenem  IVPB      meropenem  IVPB 500 milliGRAM(s) IV Intermittent every 12 hours  norepinephrine Infusion 0.77 MICROgram(s)/kG/Min IV Continuous <Continuous>  vasopressin Infusion 0.04 Unit(s)/Min IV Continuous <Continuous>    Drug Dosing Weight  Height (cm): 172.7 (30 Aug 2023 03:15)  Weight (kg): 95.2 (30 Aug 2023 03:15)  BMI (kg/m2): 31.9 (30 Aug 2023 03:15)  BSA (m2): 2.09 (30 Aug 2023 03:15)    PHYSICAL EXAM:  GENERAL: NAD  EYES: EOMI, PERRLA  NECK: Supple, No JVD; Normal thyroid; Trachea midline: No LAD   NERVOUS SYSTEM:  Alert & Oriented X3,  Motor Strength 5/5 B/L upper and lower extremities; DTRs 2+ intact and symmetric  CHEST/LUNG: No rales, rhonchi, wheezing, breath sounds present bilaterally  HEART: Regular rate and rhythm; No murmurs, no gallops  ABDOMEN: Soft, Nontender, Nondistended; Bowel sounds present, no pain or masses on palpation  : voiding well, Putnam in place  EXTREMITIES:  2+ Peripheral Pulses, No clubbing, cyanosis, or edema  SKIN: warm, intact, no lesions     LINES/DRAINS/DEVICES  CENTRAL LINE: [ ] YES [ ] NO  LOCATION:     PUTNAM: [ ] YES [ ] NO     A-LINE:  [ ] YES [ ] NO  LOCATION:       ICU Vital Signs Last 24 Hrs  T(C): 36.1 (01 Sep 2023 04:01), Max: 36.4 (31 Aug 2023 21:49)  T(F): 96.9 (01 Sep 2023 04:01), Max: 97.6 (31 Aug 2023 21:49)  HR: 104 (01 Sep 2023 07:30) (94 - 109)  BP: --  BP(mean): --  ABP: 109/63 (01 Sep 2023 07:30) (93/66 - 135/75)  ABP(mean): 80 (01 Sep 2023 07:30) (57 - 97)  RR: 23 (01 Sep 2023 07:30) (10 - 32)  SpO2: 100% (01 Sep 2023 07:30) (98% - 100%)    O2 Parameters below as of 31 Aug 2023 19:00  Patient On (Oxygen Delivery Method): nasal cannula  O2 Flow (L/min): 4          ABG - ( 31 Aug 2023 15:43 )  pH, Arterial: 7.32  pH, Blood: x     /  pCO2: 28    /  pO2: 122   / HCO3: 14    / Base Excess: -10.2 /  SaO2: 100                   08-31 @ 07:01  -  09-01 @ 07:00  --------------------------------------------------------  IN: 1092 mL / OUT: 1200 mL / NET: -108 mL              LABS:  CBC Full  -  ( 01 Sep 2023 04:57 )  WBC Count : 0.27 K/uL  RBC Count : 2.25 M/uL  Hemoglobin : 7.1 g/dL  Hematocrit : 21.1 %  Platelet Count - Automated : 9 K/uL  Mean Cell Volume : 93.8 fl  Mean Cell Hemoglobin : 31.6 pg  Mean Cell Hemoglobin Concentration : 33.6 gm/dL  Auto Neutrophil # : 0.11 K/uL  Auto Lymphocyte # : 0.11 K/uL  Auto Monocyte # : 0.05 K/uL  Auto Eosinophil # : 0.00 K/uL  Auto Basophil # : 0.00 K/uL  Auto Neutrophil % : 40.0 %  Auto Lymphocyte % : 40.0 %  Auto Monocyte % : 20.0 %  Auto Eosinophil % : 0.0 %  Auto Basophil % : 0.0 %    09-01    134<L>  |  104  |  72<H>  ----------------------------<  134<H>  4.4   |  15<L>  |  3.96<H>    Ca    6.5<LL>      01 Sep 2023 04:57  Phos  5.1     09-01  Mg     2.3     09-01    TPro  6.0  /  Alb  1.7<L>  /  TBili  0.8  /  DBili  x   /  AST  19  /  ALT  18  /  AlkPhos  104  08-31    PT/INR - ( 30 Aug 2023 14:21 )   PT: 15.3 sec;   INR: 1.35 ratio         PTT - ( 30 Aug 2023 14:21 )  PTT:31.0 sec  Urinalysis Basic - ( 01 Sep 2023 04:57 )    Color: x / Appearance: x / SG: x / pH: x  Gluc: 134 mg/dL / Ketone: x  / Bili: x / Urobili: x   Blood: x / Protein: x / Nitrite: x   Leuk Esterase: x / RBC: x / WBC x   Sq Epi: x / Non Sq Epi: x / Bacteria: x      Culture Results:   No growth at 48 Hours (08-29 @ 21:40)  Culture Results:   No growth at 48 Hours (08-29 @ 21:40)      RADIOLOGY & ADDITIONAL STUDIES REVIEWED DURING TEAM ROUNDS    [ ]GOALS OF CARE DISCUSSION WITH PATIENT/FAMILY/PROXY:    CRITICAL CARE TIME SPENT: 35 minutes   INTERVAL HPI/OVERNIGHT EVENTS: Overnight patient had 6 episodes of nonbloody watery diarrhea. Patient seen bedside, he has no signs of active bleeding. He complains of back pain 5/10 (mets to the bone), He complains of substernal chest pain, non radiating,     PRESSORS: [ ] YES [ ] NO  WHICH:    ANTIBIOTICS:                  DATE STARTED:  ANTIBIOTICS:                  DATE STARTED:    Antimicrobial:  meropenem  IVPB      meropenem  IVPB 500 milliGRAM(s) IV Intermittent every 12 hours    Cardiovascular:  DOBUTamine Infusion 2.5 MICROgram(s)/kG/Min IV Continuous <Continuous>  norepinephrine Infusion 0.77 MICROgram(s)/kG/Min IV Continuous <Continuous>    Pulmonary:    Hematalogic:    Other:  calamine/zinc oxide Lotion 1 Application(s) Topical two times a day PRN  chlorhexidine 2% Cloths 1 Application(s) Topical daily  filgrastim-sndz (ZARXIO) Injectable 480 MICROGram(s) SubCutaneous daily  hydrocortisone sodium succinate Injectable 50 milliGRAM(s) IV Push every 6 hours  HYDROmorphone  Injectable 0.5 milliGRAM(s) IV Push every 4 hours PRN  lidocaine 2% Jelly 5 milliLiter(s) IntraUrethral every 12 hours PRN  melatonin 3 milliGRAM(s) Oral at bedtime  vasopressin Infusion 0.04 Unit(s)/Min IV Continuous <Continuous>    calamine/zinc oxide Lotion 1 Application(s) Topical two times a day PRN  chlorhexidine 2% Cloths 1 Application(s) Topical daily  DOBUTamine Infusion 2.5 MICROgram(s)/kG/Min IV Continuous <Continuous>  filgrastim-sndz (ZARXIO) Injectable 480 MICROGram(s) SubCutaneous daily  hydrocortisone sodium succinate Injectable 50 milliGRAM(s) IV Push every 6 hours  HYDROmorphone  Injectable 0.5 milliGRAM(s) IV Push every 4 hours PRN  lidocaine 2% Jelly 5 milliLiter(s) IntraUrethral every 12 hours PRN  melatonin 3 milliGRAM(s) Oral at bedtime  meropenem  IVPB      meropenem  IVPB 500 milliGRAM(s) IV Intermittent every 12 hours  norepinephrine Infusion 0.77 MICROgram(s)/kG/Min IV Continuous <Continuous>  vasopressin Infusion 0.04 Unit(s)/Min IV Continuous <Continuous>    Drug Dosing Weight  Height (cm): 172.7 (30 Aug 2023 03:15)  Weight (kg): 95.2 (30 Aug 2023 03:15)  BMI (kg/m2): 31.9 (30 Aug 2023 03:15)  BSA (m2): 2.09 (30 Aug 2023 03:15)    PHYSICAL EXAM:  GENERAL: NAD  EYES: EOMI, PERRLA  NECK: Supple, No JVD; Normal thyroid; Trachea midline: No LAD   NERVOUS SYSTEM:  Alert & Oriented X3,  Motor Strength 5/5 B/L upper and lower extremities; DTRs 2+ intact and symmetric  CHEST/LUNG: No rales, rhonchi, wheezing, breath sounds present bilaterally  HEART: Regular rate and rhythm; No murmurs, no gallops  ABDOMEN: Soft, Nontender, Nondistended; Bowel sounds present, no pain or masses on palpation  : voiding well, Putnam in place  EXTREMITIES:  2+ Peripheral Pulses, No clubbing, cyanosis, or edema  SKIN: warm, intact, no lesions     LINES/DRAINS/DEVICES  CENTRAL LINE: [ ] YES [ ] NO  LOCATION:     PUTNAM: [ ] YES [ ] NO     A-LINE:  [ ] YES [ ] NO  LOCATION:       ICU Vital Signs Last 24 Hrs  T(C): 36.1 (01 Sep 2023 04:01), Max: 36.4 (31 Aug 2023 21:49)  T(F): 96.9 (01 Sep 2023 04:01), Max: 97.6 (31 Aug 2023 21:49)  HR: 104 (01 Sep 2023 07:30) (94 - 109)  BP: --  BP(mean): --  ABP: 109/63 (01 Sep 2023 07:30) (93/66 - 135/75)  ABP(mean): 80 (01 Sep 2023 07:30) (57 - 97)  RR: 23 (01 Sep 2023 07:30) (10 - 32)  SpO2: 100% (01 Sep 2023 07:30) (98% - 100%)    O2 Parameters below as of 31 Aug 2023 19:00  Patient On (Oxygen Delivery Method): nasal cannula  O2 Flow (L/min): 4          ABG - ( 31 Aug 2023 15:43 )  pH, Arterial: 7.32  pH, Blood: x     /  pCO2: 28    /  pO2: 122   / HCO3: 14    / Base Excess: -10.2 /  SaO2: 100                   08-31 @ 07:01  -  09-01 @ 07:00  --------------------------------------------------------  IN: 1092 mL / OUT: 1200 mL / NET: -108 mL              LABS:  CBC Full  -  ( 01 Sep 2023 04:57 )  WBC Count : 0.27 K/uL  RBC Count : 2.25 M/uL  Hemoglobin : 7.1 g/dL  Hematocrit : 21.1 %  Platelet Count - Automated : 9 K/uL  Mean Cell Volume : 93.8 fl  Mean Cell Hemoglobin : 31.6 pg  Mean Cell Hemoglobin Concentration : 33.6 gm/dL  Auto Neutrophil # : 0.11 K/uL  Auto Lymphocyte # : 0.11 K/uL  Auto Monocyte # : 0.05 K/uL  Auto Eosinophil # : 0.00 K/uL  Auto Basophil # : 0.00 K/uL  Auto Neutrophil % : 40.0 %  Auto Lymphocyte % : 40.0 %  Auto Monocyte % : 20.0 %  Auto Eosinophil % : 0.0 %  Auto Basophil % : 0.0 %    09-01    134<L>  |  104  |  72<H>  ----------------------------<  134<H>  4.4   |  15<L>  |  3.96<H>    Ca    6.5<LL>      01 Sep 2023 04:57  Phos  5.1     09-01  Mg     2.3     09-01    TPro  6.0  /  Alb  1.7<L>  /  TBili  0.8  /  DBili  x   /  AST  19  /  ALT  18  /  AlkPhos  104  08-31    PT/INR - ( 30 Aug 2023 14:21 )   PT: 15.3 sec;   INR: 1.35 ratio         PTT - ( 30 Aug 2023 14:21 )  PTT:31.0 sec  Urinalysis Basic - ( 01 Sep 2023 04:57 )    Color: x / Appearance: x / SG: x / pH: x  Gluc: 134 mg/dL / Ketone: x  / Bili: x / Urobili: x   Blood: x / Protein: x / Nitrite: x   Leuk Esterase: x / RBC: x / WBC x   Sq Epi: x / Non Sq Epi: x / Bacteria: x      Culture Results:   No growth at 48 Hours (08-29 @ 21:40)  Culture Results:   No growth at 48 Hours (08-29 @ 21:40)      RADIOLOGY & ADDITIONAL STUDIES REVIEWED DURING TEAM ROUNDS    [ ]GOALS OF CARE DISCUSSION WITH PATIENT/FAMILY/PROXY:    CRITICAL CARE TIME SPENT: 35 minutes   INTERVAL HPI/OVERNIGHT EVENTS: Overnight patient had 6 episodes of nonbloody watery diarrhea. Patient seen bedside, he has no signs of active bleeding. He complains of back pain 5/10 (mets to the bone), He complains of substernal chest pain, non radiating, 3/10 pain. For patient's diarrhea, stool studies were ordered.     PRESSORS: [ ] YES [x ] NO  WHICH:      Antimicrobial:  meropenem  IVPB      meropenem  IVPB 500 milliGRAM(s) IV Intermittent every 12 hours    Cardiovascular:  DOBUTamine Infusion 2.5 MICROgram(s)/kG/Min IV Continuous <Continuous>  norepinephrine Infusion 0.77 MICROgram(s)/kG/Min IV Continuous <Continuous>    Pulmonary:    Hematalogic:    Other:  calamine/zinc oxide Lotion 1 Application(s) Topical two times a day PRN  chlorhexidine 2% Cloths 1 Application(s) Topical daily  filgrastim-sndz (ZARXIO) Injectable 480 MICROGram(s) SubCutaneous daily  hydrocortisone sodium succinate Injectable 50 milliGRAM(s) IV Push every 6 hours  HYDROmorphone  Injectable 0.5 milliGRAM(s) IV Push every 4 hours PRN  lidocaine 2% Jelly 5 milliLiter(s) IntraUrethral every 12 hours PRN  melatonin 3 milliGRAM(s) Oral at bedtime  vasopressin Infusion 0.04 Unit(s)/Min IV Continuous <Continuous>    calamine/zinc oxide Lotion 1 Application(s) Topical two times a day PRN  chlorhexidine 2% Cloths 1 Application(s) Topical daily  DOBUTamine Infusion 2.5 MICROgram(s)/kG/Min IV Continuous <Continuous>  filgrastim-sndz (ZARXIO) Injectable 480 MICROGram(s) SubCutaneous daily  hydrocortisone sodium succinate Injectable 50 milliGRAM(s) IV Push every 6 hours  HYDROmorphone  Injectable 0.5 milliGRAM(s) IV Push every 4 hours PRN  lidocaine 2% Jelly 5 milliLiter(s) IntraUrethral every 12 hours PRN  melatonin 3 milliGRAM(s) Oral at bedtime  meropenem  IVPB      meropenem  IVPB 500 milliGRAM(s) IV Intermittent every 12 hours  norepinephrine Infusion 0.77 MICROgram(s)/kG/Min IV Continuous <Continuous>  vasopressin Infusion 0.04 Unit(s)/Min IV Continuous <Continuous>    Drug Dosing Weight  Height (cm): 172.7 (30 Aug 2023 03:15)  Weight (kg): 95.2 (30 Aug 2023 03:15)  BMI (kg/m2): 31.9 (30 Aug 2023 03:15)  BSA (m2): 2.09 (30 Aug 2023 03:15)    PHYSICAL EXAM:  GENERAL: AAOx3 but delirium waxes and wanes   EYES: EOMI, PERRLA  NECK: Supple, No JVD; Normal thyroid; Trachea midline: No LAD   NERVOUS SYSTEM: Motor Strength 5/5 B/L upper and lower extremities; DTRs 2+ intact and symmetric; Tone normal; ROM full and free  CHEST/LUNG: No rales, rhonchi, wheezing, breath sounds present bilaterally  HEART: Regular rate and rhythm; No murmurs, no gallops  ABDOMEN: Soft, Nontender, distended; Bowel sounds present, no pain or masses on palpation  : Putnam in place  EXTREMITIES:  2+ Peripheral Pulses, No clubbing, cyanosis, or edema  SKIN: warm, intact, no lesions     LINES/DRAINS/DEVICES  CENTRAL LINE: [x ] YES [ ] NO  LOCATION: [facility]     PUTNAM: [x ] YES [ ] NO     A-LINE:  [ ] YES [x ] NO  LOCATION:       ICU Vital Signs Last 24 Hrs  T(C): 36.1 (01 Sep 2023 04:01), Max: 36.4 (31 Aug 2023 21:49)  T(F): 96.9 (01 Sep 2023 04:01), Max: 97.6 (31 Aug 2023 21:49)  HR: 104 (01 Sep 2023 07:30) (94 - 109)  BP: --  BP(mean): --  ABP: 109/63 (01 Sep 2023 07:30) (93/66 - 135/75)  ABP(mean): 80 (01 Sep 2023 07:30) (57 - 97)  RR: 23 (01 Sep 2023 07:30) (10 - 32)  SpO2: 100% (01 Sep 2023 07:30) (98% - 100%)    O2 Parameters below as of 31 Aug 2023 19:00  Patient On (Oxygen Delivery Method): nasal cannula  O2 Flow (L/min): 4          ABG - ( 31 Aug 2023 15:43 )  pH, Arterial: 7.32  pH, Blood: x     /  pCO2: 28    /  pO2: 122   / HCO3: 14    / Base Excess: -10.2 /  SaO2: 100                   08-31 @ 07:01  -  09-01 @ 07:00  --------------------------------------------------------  IN: 1092 mL / OUT: 1200 mL / NET: -108 mL              LABS:  CBC Full  -  ( 01 Sep 2023 04:57 )  WBC Count : 0.27 K/uL  RBC Count : 2.25 M/uL  Hemoglobin : 7.1 g/dL  Hematocrit : 21.1 %  Platelet Count - Automated : 9 K/uL  Mean Cell Volume : 93.8 fl  Mean Cell Hemoglobin : 31.6 pg  Mean Cell Hemoglobin Concentration : 33.6 gm/dL  Auto Neutrophil # : 0.11 K/uL  Auto Lymphocyte # : 0.11 K/uL  Auto Monocyte # : 0.05 K/uL  Auto Eosinophil # : 0.00 K/uL  Auto Basophil # : 0.00 K/uL  Auto Neutrophil % : 40.0 %  Auto Lymphocyte % : 40.0 %  Auto Monocyte % : 20.0 %  Auto Eosinophil % : 0.0 %  Auto Basophil % : 0.0 %    09-01    134<L>  |  104  |  72<H>  ----------------------------<  134<H>  4.4   |  15<L>  |  3.96<H>    Ca    6.5<LL>      01 Sep 2023 04:57  Phos  5.1     09-01  Mg     2.3     09-01    TPro  6.0  /  Alb  1.7<L>  /  TBili  0.8  /  DBili  x   /  AST  19  /  ALT  18  /  AlkPhos  104  08-31    PT/INR - ( 30 Aug 2023 14:21 )   PT: 15.3 sec;   INR: 1.35 ratio         PTT - ( 30 Aug 2023 14:21 )  PTT:31.0 sec  Urinalysis Basic - ( 01 Sep 2023 04:57 )    Color: x / Appearance: x / SG: x / pH: x  Gluc: 134 mg/dL / Ketone: x  / Bili: x / Urobili: x   Blood: x / Protein: x / Nitrite: x   Leuk Esterase: x / RBC: x / WBC x   Sq Epi: x / Non Sq Epi: x / Bacteria: x      Culture Results:   No growth at 48 Hours (08-29 @ 21:40)  Culture Results:   No growth at 48 Hours (08-29 @ 21:40)      RADIOLOGY & ADDITIONAL STUDIES REVIEWED DURING TEAM ROUNDS    [ ]GOALS OF CARE DISCUSSION WITH PATIENT/FAMILY/PROXY:    CRITICAL CARE TIME SPENT: 35 minutes

## 2023-09-01 NOTE — CONSULT NOTE ADULT - PROBLEM SELECTOR RECOMMENDATION 2
-TTE-EF 15-20% Severe global left ventricular systolic dysfunction, Grade I diastolic dysfunction  -continues on levophed for hypotension, s/p vasopressin  -JOSE ANGEL in the setting of shock with mild L hydroureter from locally advancing disease (urology following)  -trops downtrending  -cardiology following   -hospice appropriate

## 2023-09-01 NOTE — PROGRESS NOTE ADULT - ASSESSMENT
1. JOSE ANGEL due to ATN from septic shock   CT Scan shows left mild hydroureteronephrosis with collapsed bladder.  -Scr improving to 3.9mg/dl from 4.1mg/d with increased UO. possible renal recovery.  -FeNa >1%. spot protein to creatinine ratio elevated; rpt after infection resolved.  -Adjust meds to eGFR and avoid IV Gadolinium contrast, NSAIDs, and phosphate enema.  -Monitor I/O's daily.   -Monitor SMA daily.  2. Hypotension due to septic shock  -on two pressor, weaning off.  -monitor BP.  3. Hyponatremia possible multifactorial due to hypovolemia and high ADH state with lack of free water excretion.  -Na improving to 134.  -urine lytes noted.   -Check Seurm Na daily  bid. Monitor I/O's daily. Avoid overcorrection of NA (8-10meq/day)  4. HAGMA due to Lactic Acidosis  -abg noted  -CO2 is okay.  -monitor CO2 and abg.   5. Hyperphosphatemia:  -phos is 5.1meq/L, should improve with renal recovery.  -corrected Ca is 8.3mg/dL     Patient is critically ill. Time Spent: 35mins.  Discussed with patient in detail regarding the renal plan and care  Discussed the assessment and plan with ICU Team/Nurse   1. JOSE ANGEL due to ATN from septic shock   CT Scan shows left mild hydroureteronephrosis with collapsed bladder.  -Scr improving to 3.9mg/dl from 4.1mg/d with increased UO. possible renal recovery.  -FeNa >1%. spot protein to creatinine ratio elevated; rpt after infection resolved.  -Adjust meds to eGFR and avoid IV Gadolinium contrast, NSAIDs, and phosphate enema.  -Monitor I/O's daily.   -Monitor SMA daily.  2. Hypotension due to septic shock  -on two pressor, weaning off.  -monitor BP.  3. Hyponatremia possible multifactorial due to hypovolemia and high ADH state with lack of free water excretion.  -Na improving to 134.  -urine lytes noted.   -Check Seurm Na daily  bid. Monitor I/O's daily. Avoid overcorrection of NA (8-10meq/day)  4. HAGMA due to Lactic Acidosis  -abg noted  -CO2 is okay.  -monitor CO2 and abg.   5. Hyperphosphatemia:  -phos is 5.1meq/L, should improve with renal recovery.  -corrected Ca is 8.3mg/dL     Dr. Benjy bey cover til Tuesday (9/5)    Patient is critically ill. Time Spent: 35mins.  Discussed with patient in detail regarding the renal plan and care  Discussed the assessment and plan with ICU Team/Nurse

## 2023-09-01 NOTE — PROGRESS NOTE ADULT - ASSESSMENT
This is a 62 yo M with pmhx of prostate CA with mets to liver and bone, on Cabazitaxel q3 weeks, presents to the ED for generalized weakness and pain admitted to ICU for septic shock 2/2 neutropenic fever and urosepsis    DX:  1. Septic Shock  2. Neutropenic fever  3. JOSE ANGEL  4. Lactic acidosis- now resolved  5. Tachypnea  6. Sinus tachycardia  7. Schistocytes  8. Thrombocytopenia  9. Elevated troponins  =================== Neuro============================  Alert and oriented x 2-3 at not base line ( A and O x 3)  Likely 2/2 sepsis      ================= Cardiovascular==========================  #Sinus Tachycardia  Likely in setting of sepsis and dehydration      # Cardiogenic Shock   Troponin trending up-678-->1000  EKG- NSR  trend trops  hypotension: on levophed 0.77 and vasopressin 0.04  TTE-EF 15-20%  Severe global left ventricular systolic  dysfunction, Grade I diastolic dysfunction  F/U cardiology Dr. Farah consulted for elevated trops and further vasopressor treatment  ================- Pulm=================================  #Tachypnea  Likely 2/2 lactic acidosis  c/w bipap for PRN  ==================ID===================================  #Septic Shock  meropenam day 2  s/p 3L LR and 2L NS (total 5L)  Because patient w/ chronic steroid use, will give hydrocortisone 200 x1, and 50 q6hrs  hypotension: levophed and vasopressin  f/u ucx   Infectious Dx Paul following    #Neutropenic fever- now afebrile  With ANC of 25  WBC trending up- 0.05->0.11->0.18  On Fligrastim inj sc OD  meropenam day2/8  s/p vanc trough WNL  Heme/Onc QMA consulted (8/31)Dr. Casper   ================= Nephro================================  #JOSE ANGEL  Cr 3.55 on admission->4.13(8/31/23)  CT A/P Mild left hydroureteronephrosis extending to the collapsed bladder  Urine lytes- WNL  f/u UCx  Urology consulted- Dr. Goldsmith    #Lactic Acidosis now resolved  Lactate 4.4---> 1.7  In setting of hypoperfusion  s/p 5L (3LR 2NS)    =================GI====================================  #Diarrhea now resolved  had 3 episodes of diarrhea in ED, no further episodes since ED  Likely 2/2 chemotherapy use  Cancel Stool cx, gi pcr, stool O&P, shiga toxin    ================ Heme==================================  #Neutropenia  As above    #Schistocytes. HUS vs TTP  thrombocytopenia (no active bleeding)  JOSE ANGEL     #Thrombocytopenia  Plt trending down-19-->9  Platelet transfusion, consent obtained from wife    f/u ADAMTS 13  =================Endocrine===============================  No issues    ================= Skin/Catheters============================  Peripheral IV's  Central line- R IJV (8/30)  Arterial line R radial (8/30)  Parisi catheter    =================Prophylaxis =============================  SCD's  Will hold off chemical ppx due to thrombocytopenia    ==================GOC==================================  FULL CODE - To reopen GOC discussion today      This is a 64 yo M with pmhx of prostate CA with mets to liver and bone, on Cabazitaxel q3 weeks, presents to the ED for generalized weakness and pain admitted to ICU for septic shock 2/2 neutropenic fever and urosepsis    DX:  1. Septic Shock  2. Neutropenic fever  3. JOSE ANGEL  4. Lactic acidosis- now resolved  5. Tachypnea  6. Sinus tachycardia  7. Schistocytes  8. Thrombocytopenia  9. Elevated troponins  10. Back pain  =================== Neuro============================  Alert and oriented x 2-3 at not base line ( A and O x 3)  Likely 2/2 sepsis      ================= Cardiovascular==========================  #Sinus Tachycardia  Likely in setting of sepsis and dehydration    # Cardiogenic Shock   Troponin trending down 2094>1891  EKG- NSR  hypotension: on levophed 0.19. d/c  vasopressin  TTE-EF 15-20% Severe global left ventricular systolic dysfunction, Grade I diastolic dysfunction  F/U cardiology Dr. Farah consulted for elevated trops and further vasopressor treatment  ================- Pulm=================================  #Tachypnea  Likely 2/2 lactic acidosis  c/w bipap for PRN  f/u chest xray  ==================ID===================================  #Septic Shock  meropenam day 2  s/p 3L LR and 2L NS (total 5L)  patient w/ chronic steroid use, will give hydrocortisone 200 x1, and 50 q6hrs  hypotension: levophed and dobutamine. d/c vasopressin  f/u ucx   Infectious Dx Paul following    #Neutropenic fever- now afebrile  With ANC of 25  WBC trending up- 0.05->0.20  On Fligrastim inj sc OD  meropenam day2/8  s/p vanc trough WNL  Heme/Onc QMA consulted (8/31)Dr. Casper   ================= Nephro================================  #JOSE ANGEL  Cr 3.55 on admission->4.13(8/31/23)  CT A/P Mild left hydroureteronephrosis extending to the collapsed bladder  Urine lytes- WNL  f/u UCx  Urology consulted- Dr. Goldsmith    #Lactic Acidosis now resolved  Lactate 4.4---> 1.7  In setting of hypoperfusion  s/p 5L (3LR 2NS)    =================GI====================================  #Diarrhea  had 3 episodes of diarrhea in ED, 6episodes (9/1)  Likely 2/2 chemotherapy use  ordered Stool cx, gi pcr, stool O&P    ================ Heme==================================  #Neutropenia  As above    #Schistocytes. HUS vs TTP  thrombocytopenia (no active bleeding)  JOSE ANGEL     #Thrombocytopenia  Plt trending down-19-->9  s/p 1 unit Platelet transfusion (8/31)  to transfuse 2u platelets (9/1)  consent obtained from wife  f/u ADAMTS 13    #anemia  to transfuse 2u prbc  =================Endocrine===============================  No issues  ================= MSK============================  #Back pain 2/2 bone mets  -consulted pain management  - dilaudid 0.4 qh6 prn    ================= Skin/Catheters============================  Peripheral IV's  Central line- R IJV (8/30)  Arterial line R radial (8/30)  Parisi catheter  =================Prophylaxis =============================  SCD's  Will hold off chemical ppx due to thrombocytopenia    ==================GOC==================================  FULL CODE - Rediscuss on monday (9/4)  Last discussed 9/1: Family would like to de-escalated care and thinking hospice is a possible option, but plan is to discuss again on 9/4

## 2023-09-01 NOTE — CONSULT NOTE ADULT - PROBLEM SELECTOR RECOMMENDATION 3
Clinical evidence indicates that the patient has Severe protein calorie malnutrition/ 3rd degree    In context of     Acute Illness/Injury (>7days)    vs    Chronic Illness (>1 month)    Energy/Food intake <50% of estimated energy requirement >5 days  Weight loss: Moderate - severe (lbs lost recently)  Body Fat loss: Severe   (Cachexia, temporal wasting, contracted, muscle atrophy)  Muscle mass loss: Severe  (Skin failure/pressure ulcers)  Fluid Accumulation: Severe (Fluid overload, ascites, pleural effusions)   Strength: weakened severe (bedbound)    Recommend:   diet as tolerated, nutritional supplements and dietary consultation PRN

## 2023-09-01 NOTE — CONSULT NOTE ADULT - PROBLEM SELECTOR RECOMMENDATION 4
-GOC are comfort focused, goal to optimize pt and DC to home vs LTC with hospice services  -MOLST with DNR/I in place    Discussed with primary team.

## 2023-09-01 NOTE — PROGRESS NOTE ADULT - SUBJECTIVE AND OBJECTIVE BOX
Subjective  Patient continues to be in a critical state on three pressors. patient had adequate UOP overnight with nearly 1700cc. JOSE ANGEL slowly improving with Cr of 3.96 today from 4.00 yesterday down from peak of 4.29.    Objective    Vital signs  T(F): , Max: 97.6 (08-31-23 @ 21:49)  HR: 102 (09-01-23 @ 09:15)  BP: --  SpO2: 100% (09-01-23 @ 09:15)  Wt(kg): --    Output     OUT:    Indwelling Catheter - Urethral (mL): 2500 mL  Total OUT: 2500 mL    Total NET: -2500 mL      OUT:    Indwelling Catheter - Urethral (mL): 200 mL  Total OUT: 200 mL    Total NET: -200 mL          Gen: NAD  Abd: soft, nontender, nondistended  : koehler secured in place, draining CYU. Patient with no bilateral flank pain.    Labs      09-01 @ 04:57    WBC 0.27  / Hct 21.1  / SCr 3.96     08-31 @ 16:10    WBC 0.20  / Hct 22.1  / SCr 4.00         Culture - Urine (collected 08-30-23 @ 12:55)  Source: Clean Catch Clean Catch (Midstream)  Preliminary Report (09-01-23 @ 09:41):    10,000 - 49,000 CFU/mL Escherichia coli    Culture - Blood (collected 08-29-23 @ 21:40)  Source: .Blood Blood-Peripheral  Preliminary Report (09-01-23 @ 02:03):    No growth at 48 Hours    Culture - Blood (collected 08-29-23 @ 21:40)  Source: .Blood Blood-Peripheral  Preliminary Report (09-01-23 @ 02:03):    No growth at 48 Hours        Urine Cx: E. Coli  Blood Cx: NGTD    Imaging

## 2023-09-02 DIAGNOSIS — R07.9 CHEST PAIN, UNSPECIFIED: ICD-10-CM

## 2023-09-02 LAB
-  AMIKACIN: SIGNIFICANT CHANGE UP
-  AMOXICILLIN/CLAVULANIC ACID: SIGNIFICANT CHANGE UP
-  AMPICILLIN/SULBACTAM: SIGNIFICANT CHANGE UP
-  AMPICILLIN: SIGNIFICANT CHANGE UP
-  AZTREONAM: SIGNIFICANT CHANGE UP
-  CEFAZOLIN: SIGNIFICANT CHANGE UP
-  CEFEPIME: SIGNIFICANT CHANGE UP
-  CEFOXITIN: SIGNIFICANT CHANGE UP
-  CEFTRIAXONE: SIGNIFICANT CHANGE UP
-  CEFUROXIME: SIGNIFICANT CHANGE UP
-  CIPROFLOXACIN: SIGNIFICANT CHANGE UP
-  ERTAPENEM: SIGNIFICANT CHANGE UP
-  GENTAMICIN: SIGNIFICANT CHANGE UP
-  IMIPENEM: SIGNIFICANT CHANGE UP
-  LEVOFLOXACIN: SIGNIFICANT CHANGE UP
-  MEROPENEM: SIGNIFICANT CHANGE UP
-  NITROFURANTOIN: SIGNIFICANT CHANGE UP
-  PIPERACILLIN/TAZOBACTAM: SIGNIFICANT CHANGE UP
-  TOBRAMYCIN: SIGNIFICANT CHANGE UP
-  TRIMETHOPRIM/SULFAMETHOXAZOLE: SIGNIFICANT CHANGE UP
ALBUMIN SERPL ELPH-MCNC: 1.8 G/DL — LOW (ref 3.5–5)
ALP SERPL-CCNC: 145 U/L — HIGH (ref 40–120)
ALT FLD-CCNC: 19 U/L DA — SIGNIFICANT CHANGE UP (ref 10–60)
ANION GAP SERPL CALC-SCNC: 12 MMOL/L — SIGNIFICANT CHANGE UP (ref 5–17)
AST SERPL-CCNC: 16 U/L — SIGNIFICANT CHANGE UP (ref 10–40)
BILIRUB SERPL-MCNC: 0.9 MG/DL — SIGNIFICANT CHANGE UP (ref 0.2–1.2)
BLD GP AB SCN SERPL QL: SIGNIFICANT CHANGE UP
BUN SERPL-MCNC: 84 MG/DL — HIGH (ref 7–18)
CALCIUM SERPL-MCNC: 6.5 MG/DL — CRITICAL LOW (ref 8.4–10.5)
CHLORIDE SERPL-SCNC: 105 MMOL/L — SIGNIFICANT CHANGE UP (ref 96–108)
CO2 SERPL-SCNC: 16 MMOL/L — LOW (ref 22–31)
CREAT SERPL-MCNC: 3.89 MG/DL — HIGH (ref 0.5–1.3)
CULTURE RESULTS: SIGNIFICANT CHANGE UP
EGFR: 16 ML/MIN/1.73M2 — LOW
GLUCOSE SERPL-MCNC: 107 MG/DL — HIGH (ref 70–99)
HCT VFR BLD CALC: 24.8 % — LOW (ref 39–50)
HGB BLD-MCNC: 8.5 G/DL — LOW (ref 13–17)
MAGNESIUM SERPL-MCNC: 2.3 MG/DL — SIGNIFICANT CHANGE UP (ref 1.6–2.6)
MCHC RBC-ENTMCNC: 30.8 PG — SIGNIFICANT CHANGE UP (ref 27–34)
MCHC RBC-ENTMCNC: 34.3 GM/DL — SIGNIFICANT CHANGE UP (ref 32–36)
MCV RBC AUTO: 89.9 FL — SIGNIFICANT CHANGE UP (ref 80–100)
METHOD TYPE: SIGNIFICANT CHANGE UP
NRBC # BLD: 0 /100 WBCS — SIGNIFICANT CHANGE UP (ref 0–0)
ORGANISM # SPEC MICROSCOPIC CNT: SIGNIFICANT CHANGE UP
ORGANISM # SPEC MICROSCOPIC CNT: SIGNIFICANT CHANGE UP
PHOSPHATE SERPL-MCNC: 5.4 MG/DL — HIGH (ref 2.5–4.5)
PLATELET # BLD AUTO: 25 K/UL — LOW (ref 150–400)
POTASSIUM SERPL-MCNC: 3.9 MMOL/L — SIGNIFICANT CHANGE UP (ref 3.5–5.3)
POTASSIUM SERPL-SCNC: 3.9 MMOL/L — SIGNIFICANT CHANGE UP (ref 3.5–5.3)
PROT SERPL-MCNC: 6.1 G/DL — SIGNIFICANT CHANGE UP (ref 6–8.3)
RBC # BLD: 2.76 M/UL — LOW (ref 4.2–5.8)
RBC # FLD: 18.3 % — HIGH (ref 10.3–14.5)
SODIUM SERPL-SCNC: 133 MMOL/L — LOW (ref 135–145)
SPECIMEN SOURCE: SIGNIFICANT CHANGE UP
VANCOMYCIN TROUGH SERPL-MCNC: 9 UG/ML — LOW (ref 10–20)
WBC # BLD: 0.94 K/UL — CRITICAL LOW (ref 3.8–10.5)
WBC # FLD AUTO: 0.94 K/UL — CRITICAL LOW (ref 3.8–10.5)

## 2023-09-02 RX ORDER — ACETAMINOPHEN 500 MG
1000 TABLET ORAL EVERY 8 HOURS
Refills: 0 | Status: COMPLETED | OUTPATIENT
Start: 2023-09-02 | End: 2023-09-03

## 2023-09-02 RX ORDER — CALCIUM GLUCONATE 100 MG/ML
1 VIAL (ML) INTRAVENOUS ONCE
Refills: 0 | Status: COMPLETED | OUTPATIENT
Start: 2023-09-02 | End: 2023-09-02

## 2023-09-02 RX ORDER — CEFTRIAXONE 500 MG/1
1000 INJECTION, POWDER, FOR SOLUTION INTRAMUSCULAR; INTRAVENOUS EVERY 24 HOURS
Refills: 0 | Status: DISCONTINUED | OUTPATIENT
Start: 2023-09-02 | End: 2023-09-06

## 2023-09-02 RX ORDER — POLYETHYLENE GLYCOL 3350 17 G/17G
17 POWDER, FOR SOLUTION ORAL DAILY
Refills: 0 | Status: DISCONTINUED | OUTPATIENT
Start: 2023-09-02 | End: 2023-09-13

## 2023-09-02 RX ORDER — SENNA PLUS 8.6 MG/1
2 TABLET ORAL AT BEDTIME
Refills: 0 | Status: DISCONTINUED | OUTPATIENT
Start: 2023-09-02 | End: 2023-09-15

## 2023-09-02 RX ADMIN — Medication 1000 MILLIGRAM(S): at 22:26

## 2023-09-02 RX ADMIN — HYDROMORPHONE HYDROCHLORIDE 1 MILLIGRAM(S): 2 INJECTION INTRAMUSCULAR; INTRAVENOUS; SUBCUTANEOUS at 01:37

## 2023-09-02 RX ADMIN — Medication 500000 UNIT(S): at 05:13

## 2023-09-02 RX ADMIN — SENNA PLUS 2 TABLET(S): 8.6 TABLET ORAL at 21:57

## 2023-09-02 RX ADMIN — CEFTRIAXONE 100 MILLIGRAM(S): 500 INJECTION, POWDER, FOR SOLUTION INTRAMUSCULAR; INTRAVENOUS at 17:35

## 2023-09-02 RX ADMIN — Medication 500000 UNIT(S): at 00:14

## 2023-09-02 RX ADMIN — Medication 3 MILLIGRAM(S): at 21:57

## 2023-09-02 RX ADMIN — Medication 50 MILLIGRAM(S): at 17:35

## 2023-09-02 RX ADMIN — Medication 1000 MILLIGRAM(S): at 21:56

## 2023-09-02 RX ADMIN — MEROPENEM 100 MILLIGRAM(S): 1 INJECTION INTRAVENOUS at 05:12

## 2023-09-02 RX ADMIN — Medication 50 MILLIGRAM(S): at 11:38

## 2023-09-02 RX ADMIN — Medication 500000 UNIT(S): at 11:38

## 2023-09-02 RX ADMIN — Medication 4.28 MICROGRAM(S)/KG/MIN: at 01:48

## 2023-09-02 RX ADMIN — Medication 50 MILLIGRAM(S): at 05:13

## 2023-09-02 RX ADMIN — CHLORHEXIDINE GLUCONATE 1 APPLICATION(S): 213 SOLUTION TOPICAL at 13:31

## 2023-09-02 RX ADMIN — HYDROMORPHONE HYDROCHLORIDE 1 MILLIGRAM(S): 2 INJECTION INTRAMUSCULAR; INTRAVENOUS; SUBCUTANEOUS at 02:07

## 2023-09-02 RX ADMIN — Medication 100 GRAM(S): at 05:58

## 2023-09-02 RX ADMIN — Medication 500000 UNIT(S): at 17:35

## 2023-09-02 RX ADMIN — Medication 480 MICROGRAM(S): at 13:35

## 2023-09-02 NOTE — PROGRESS NOTE ADULT - SUBJECTIVE AND OBJECTIVE BOX
ICU VISIT  63y Male    Meds:  meropenem  IVPB      meropenem  IVPB 500 milliGRAM(s) IV Intermittent every 12 hours  nystatin    Suspension [id number] Unit(s) Swish and Swallow four times a day    Allergies    No Known Allergies    Intolerances        VITALS:  Vital Signs Last 24 Hrs  T(C): 36 (02 Sep 2023 16:06), Max: 36.8 (01 Sep 2023 19:00)  T(F): 96.8 (02 Sep 2023 16:06), Max: 98.2 (01 Sep 2023 19:00)  HR: 86 (02 Sep 2023 15:00) (84 - 113)  BP: --  BP(mean): --  RR: 11 (02 Sep 2023 15:00) (9 - 23)  SpO2: 99% (02 Sep 2023 15:00) (97% - 100%)    Parameters below as of 02 Sep 2023 15:00  Patient On (Oxygen Delivery Method): room air        LABS/DIAGNOSTIC TESTS:                          8.5    0.94  )-----------( 25       ( 02 Sep 2023 03:59 )             24.8         09-02    133<L>  |  105  |  84<H>  ----------------------------<  107<H>  3.9   |  16<L>  |  3.89<H>    Ca    6.5<LL>      02 Sep 2023 03:59  Phos  5.4     09-02  Mg     2.3     09-02    TPro  6.1  /  Alb  1.8<L>  /  TBili  0.9  /  DBili  x   /  AST  16  /  ALT  19  /  AlkPhos  145<H>  09-02      LIVER FUNCTIONS - ( 02 Sep 2023 03:59 )  Alb: 1.8 g/dL / Pro: 6.1 g/dL / ALK PHOS: 145 U/L / ALT: 19 U/L DA / AST: 16 U/L / GGT: x             CULTURES: Clean Catch Clean Catch (Midstream)  08-30 @ 12:55   10,000 - 49,000 CFU/mL Escherichia coli  --  Escherichia coli      .Blood Blood-Peripheral  08-29 @ 21:40   No growth at 72 Hours  --  --            RADIOLOGY:      ROS:  [  ] UNABLE TO ELICIT ICU VISIT  63y Male who is still in the ICU but is doing better overall , he has no new complaints, he has no fevers or chills, no diarrhea, his E. Coli in urine is pansensitive and so will switch him to Rocephin. His cr is decreasing slowly.     Meds:  meropenem  IVPB      meropenem  IVPB 500 milliGRAM(s) IV Intermittent every 12 hours  nystatin    Suspension [id number] Unit(s) Swish and Swallow four times a day    Allergies    No Known Allergies    Intolerances        VITALS:  Vital Signs Last 24 Hrs  T(C): 36 (02 Sep 2023 16:06), Max: 36.8 (01 Sep 2023 19:00)  T(F): 96.8 (02 Sep 2023 16:06), Max: 98.2 (01 Sep 2023 19:00)  HR: 86 (02 Sep 2023 15:00) (84 - 113)  BP: --  BP(mean): --  RR: 11 (02 Sep 2023 15:00) (9 - 23)  SpO2: 99% (02 Sep 2023 15:00) (97% - 100%)    Parameters below as of 02 Sep 2023 15:00  Patient On (Oxygen Delivery Method): room air        LABS/DIAGNOSTIC TESTS:                          8.5    0.94  )-----------( 25       ( 02 Sep 2023 03:59 )             24.8         09-02    133<L>  |  105  |  84<H>  ----------------------------<  107<H>  3.9   |  16<L>  |  3.89<H>    Ca    6.5<LL>      02 Sep 2023 03:59  Phos  5.4     09-02  Mg     2.3     09-02    TPro  6.1  /  Alb  1.8<L>  /  TBili  0.9  /  DBili  x   /  AST  16  /  ALT  19  /  AlkPhos  145<H>  09-02      LIVER FUNCTIONS - ( 02 Sep 2023 03:59 )  Alb: 1.8 g/dL / Pro: 6.1 g/dL / ALK PHOS: 145 U/L / ALT: 19 U/L DA / AST: 16 U/L / GGT: x             CULTURES: Clean Catch Clean Catch (Midstream)  08-30 @ 12:55   10,000 - 49,000 CFU/mL Escherichia coli  --  Escherichia coli      .Blood Blood-Peripheral  08-29 @ 21:40   No growth at 72 Hours  --  --            RADIOLOGY:      ROS:  [  ] UNABLE TO ELICIT

## 2023-09-02 NOTE — PROGRESS NOTE ADULT - SUBJECTIVE AND OBJECTIVE BOX
DHAVAL BIRD  63y  Patient is a 63y old  Male who presents with a chief complaint of Septic Shock 2/2 neutropenic fever (02 Sep 2023 12:23)    HPI:  Seen and examined. Followed for JOSE ANGEL in the setting of sepsis and septic shock.      HEALTH ISSUES - PROBLEM Dx:  Suspected pulmonary embolism    Prostate cancer metastatic to multiple sites    Cardiogenic shock    Severe protein-calorie malnutrition    Encounter for palliative care    Chest pain          MEDICATIONS  (STANDING):  acetaminophen     Tablet .. 1000 milliGRAM(s) Oral every 8 hours  chlorhexidine 2% Cloths 1 Application(s) Topical daily  fentaNYL   Patch  25 MICROgram(s)/Hr 1 Patch Transdermal every 72 hours  filgrastim-sndz (ZARXIO) Injectable 480 MICROGram(s) SubCutaneous daily  hydrocortisone sodium succinate Injectable 50 milliGRAM(s) IV Push every 6 hours  melatonin 3 milliGRAM(s) Oral at bedtime  meropenem  IVPB      meropenem  IVPB 500 milliGRAM(s) IV Intermittent every 12 hours  nystatin    Suspension [id number] Unit(s) Swish and Swallow four times a day  polyethylene glycol 3350 17 Gram(s) Oral daily  senna 2 Tablet(s) Oral at bedtime    MEDICATIONS  (PRN):  calamine/zinc oxide Lotion 1 Application(s) Topical two times a day PRN Rash and/or Itching  HYDROmorphone  Injectable 1 milliGRAM(s) IV Push every 4 hours PRN Pain  lidocaine 2% Jelly 5 milliLiter(s) IntraUrethral every 12 hours PRN Pain at koehler site    Vital Signs Last 24 Hrs  T(C): 35.9 (02 Sep 2023 12:00), Max: 36.8 (01 Sep 2023 19:00)  T(F): 96.7 (02 Sep 2023 12:00), Max: 98.2 (01 Sep 2023 19:00)  HR: 86 (02 Sep 2023 15:00) (84 - 113)  BP: --  BP(mean): --  RR: 11 (02 Sep 2023 15:00) (9 - 24)  SpO2: 99% (02 Sep 2023 15:00) (97% - 100%)    Parameters below as of 02 Sep 2023 15:00  Patient On (Oxygen Delivery Method): room air      Daily     Daily Weight in k.2 (02 Sep 2023 08:00)    PHYSICAL EXAM:  Constitutional: He appears comfortable and not distressed. Not diaphoretic.    Neck:  The thyroid is normal. Trachea is midline.     Breasts: Normal examination.    Respiratory: The lungs are clear to auscultation. No dullness and expansion is normal.    Cardiovascular: S1 and S2 are normal. No mummurs, rubs or gallops are present.    Gastrointestinal: The abdomen is soft. No tenderness is present. No masses are present. Bowel sounds are normal.    Genitourinary: The bladder is not distended. No CVA tenderness is present.    Extremities: No edema is noted. No deformities are present.    Neurological:  Tone, power and sensation are normal.     Skin: No leasions are seen  or palpated.    Lymph Nodes: No lymphadenopathy is present.    Psychiatric: Mood is appropriate. No hallucinations or flight of ideas are noted.                              8.5    0.94  )-----------( 25       ( 02 Sep 2023 03:59 )             24.8     09-    133<L>  |  105  |  84<H>  ----------------------------<  107<H>  3.9   |  16<L>  |  3.89<H>    Ca    6.5<LL>      02 Sep 2023 03:59  Phos  5.4     -  Mg     2.3     -    TPro  6.1  /  Alb  1.8<L>  /  TBili  0.9  /  DBili  x   /  AST  16  /  ALT  19  /  AlkPhos  145<H>      < from: US Renal (23 @ 14:16) >  There is bilateral increased renal cortical echogenicity.    Right kidney measures 12.7 cm in length. No hydronephrosis or shadowing   stone.    Left kidney measures 11.9 cm in length. There is mild hydronephrosis.    Urinary bladder is incompletely collapsed around a Koehler catheter.    < end of copied text >  Total Protein, Random Urine: 805 mg/dL (23 @ 12:55)   Creatinine, Random Urine: 36 mg/dL (23 @ 12:55)   UPC=22.

## 2023-09-02 NOTE — PROGRESS NOTE ADULT - ASSESSMENT
Septic shock - resolved  UTI  Neutropenia       Plan - DC Meropenem   Start Rocephin 1gm iv q24hrs  Time spent - 30 mins

## 2023-09-02 NOTE — CONSULT NOTE ADULT - CONSULT REASON
JOSE ANGEL
KARLIE joyce hydro
Septic shock/ Febrile neutropenia
CHF
Hx of prostate cancer  Metastatic  On Cabazitaxel
Chest and throat pain
goals of care, complex medical decision-making

## 2023-09-02 NOTE — CONSULT NOTE ADULT - SUBJECTIVE AND OBJECTIVE BOX
Source of information: DHAVAL BIRD, Chart review, spouse and daughter  Patient language: English  : n/a    HPI:  This is a 62 yo M with pmhx of prostate CA with mets to liver and bone, on Cabazitaxel q3 weeks, presents to the ED for generalized weakness and pain. Per wife, who is at bedside, states patient had an episode of loss of consciousness and fall, with head trauma. Patient also mentions having dysuria since yesterday. patient started this regimen in march, and this is the first time he has had such symptoms. He follows up with Oncologist Dr. Nasir Gondal. He received his last chemo treatment 1 week ago.  (30 Aug 2023 01:34)      This is a Patient is a 63y old  Male who presents with a chief complaint of Septic Shock 2/2 neutropenic fever (02 Sep 2023 12:23)    Patient is currently in the ICU and Pain service consulted for chest and throat pain.  Pt seen and examined at bedside with wife and daughter present.  Pt is awake, speech is clear but noncoherent at times.  Pt reports that he has occasional chest pain (pointed to mid sternum) but did not offer a score when  asked.  He also reports throat pain with coughs.  Per ISTOP pt was on Fentanyl patch 75 mcg/hr at home and Percocet.  He reports that he is comfortable with the current pain regimen and family reports that he is less agitated today in comparison to yesterday.   Pt is tolerating PO diet. Pt denies SOB, nausea, vomiting and constipation. Patient stated goal for pain control: to be able to take deep breaths, get out of bed to chair and ambulate with tolerable pain control.     PAST MEDICAL & SURGICAL HISTORY:    CA of prostate    Enlarged prostate with lower urinary tract symptoms (LUTS)    No significant past surgical history    FAMILY HISTORY:    Family history of hypertension (Mother)    Social History:   [X]Denies ETOH use, illicit drug use, and smoking     Allergies    No Known Allergies    MEDICATIONS  (STANDING):  chlorhexidine 2% Cloths 1 Application(s) Topical daily  fentaNYL   Patch  25 MICROgram(s)/Hr 1 Patch Transdermal every 72 hours  filgrastim-sndz (ZARXIO) Injectable 480 MICROGram(s) SubCutaneous daily  hydrocortisone sodium succinate Injectable 50 milliGRAM(s) IV Push every 6 hours  melatonin 3 milliGRAM(s) Oral at bedtime  meropenem  IVPB      meropenem  IVPB 500 milliGRAM(s) IV Intermittent every 12 hours  nystatin    Suspension [id number] Unit(s) Swish and Swallow four times a day    MEDICATIONS  (PRN):  calamine/zinc oxide Lotion 1 Application(s) Topical two times a day PRN Rash and/or Itching  HYDROmorphone  Injectable 1 milliGRAM(s) IV Push every 4 hours PRN Pain  lidocaine 2% Jelly 5 milliLiter(s) IntraUrethral every 12 hours PRN Pain at koehler site      Vital Signs Last 24 Hrs  T(C): 35.9 (02 Sep 2023 12:00), Max: 36.8 (01 Sep 2023 19:00)  T(F): 96.7 (02 Sep 2023 12:00), Max: 98.2 (01 Sep 2023 19:00)  HR: 97 (02 Sep 2023 13:00) (84 - 113)  BP: --  BP(mean): --  RR: 17 (02 Sep 2023 13:00) (9 - 24)  SpO2: 98% (02 Sep 2023 13:00) (97% - 100%)    Parameters below as of 02 Sep 2023 13:00  Patient On (Oxygen Delivery Method): room air      SARS-CoV-2: NotDetec (30 Aug 2023 09:30)    LABS: Reviewed                          8.5    0.94  )-----------( 25       ( 02 Sep 2023 03:59 )             24.8     09-02    133<L>  |  105  |  84<H>  ----------------------------<  107<H>  3.9   |  16<L>  |  3.89<H>    Ca    6.5<LL>      02 Sep 2023 03:59  Phos  5.4     09-02  Mg     2.3     09-02    TPro  6.1  /  Alb  1.8<L>  /  TBili  0.9  /  DBili  x   /  AST  16  /  ALT  19  /  AlkPhos  145<H>  09-02      LIVER FUNCTIONS - ( 02 Sep 2023 03:59 )  Alb: 1.8 g/dL / Pro: 6.1 g/dL / ALK PHOS: 145 U/L / ALT: 19 U/L DA / AST: 16 U/L / GGT: x           Urinalysis Basic - ( 02 Sep 2023 03:59 )    Color: x / Appearance: x / SG: x / pH: x  Gluc: 107 mg/dL / Ketone: x  / Bili: x / Urobili: x   Blood: x / Protein: x / Nitrite: x   Leuk Esterase: x / RBC: x / WBC x   Sq Epi: x / Non Sq Epi: x / Bacteria: x    CAPILLARY BLOOD GLUCOSE    SARS-CoV-2: NotDetec (30 Aug 2023 09:30)    Radiology: Reviewed  < from: CT Cervical Spine No Cont (08.29.23 @ 18:05) >    ACC: [id number] EXAM:  CT CERVICAL SPINE   ORDERED BY: DENNY BRITO     ACC: [id number] EXAM:  CT BRAIN   ORDERED BY: DENNY BRITO     PROCEDURE DATE:  08/29/2023      INTERPRETATION:  CLINICAL INDICATION: Fall with head trauma, prostate   cancer    Brain CT:    5mm axial sections of the brain were obtained from base to vertex,   without the intravenous administration of contrast material. Coronal and   sagittal computer generated reconstructed views are available.    No prior brain imaging is available for comparison.    The fourth, third and lateral ventricles are normal size and position.   There is no hemorrhage, mass or shift of the midline structures. There is   normal gray white matter differentiation. Bone window examination is   remarkable for a foci of increased density within the frontal calvarium   bilaterally suggestive of sclerotic calvarial metastases.    Cervical spine CT:    Serial thin sections on a multi slice scanner were obtained through the   cervical spine from the C1 to the level T2 in a stacked axial fashion   reformatted at 1.25 mm sections with sagittal and coronal computer   generated reconstructed views.    There is normal alignment of the vertebral bodies and facet joints.    The vertebral bodies are normal in height. There are multiple foci of   increased density within the vertebral bodies in the C3, C4, C7 and T1   and T2 vertebral bodies and are consistent with sclerotic metastasis. No   paraspinal masses are identified. Mild degenerative uncal vertebral joint   and facet changes are noted. There is mild reversal the normal   straightening of the normal cervical lordosis. No acute fractures or   dislocations are identified.    IMPRESSION:     Brain CT: No intracranial hemorrhage or mass. Calvarial metastases.    Cervical spine CT: No acute fractures or dislocations. Degenerative   changes. Osseous metastases.    --- End of Report ---      KIRT PAK MD; Attending Radiologist  This document has been electronically signed. Aug29 2023  6:10PM    < end of copied text >      ORT Score -   Family Hx of substance abuse	Female	      Male  Alcohol 	                                           1                     3  Illegal drugs	                                   2                     3  Rx drugs                                           4 	                  4  Personal Hx of substance abuse		  Alcohol 	                                          3	                  3  Illegal drugs                                     4	                  4  Rx drugs                                            5 	                  5  Age between 16- 45 years	           1                     1  hx preadolescent sexual abuse	   3 	                  0  Psychological disease		  ADD, OCD, bipolar, schizophrenia   2	          2  Depression                                           1 	          1  Total: 0    a score of 3 or lower indicates low risk for opioid abuse		  a score of 4-7 indicates moderate risk for opioid abuse		  a score of 8 or higher indicates high risk for opioid abuse    REVIEW OF SYSTEMS:  CONSTITUTIONAL: + fatigue, Alert with periods of confusion  HEENT:  No difficulty hearing, no change in vision  NECK: No pain or stiffness  RESPIRATORY: + coughs, no shortness of breath  CARDIOVASCULAR: + chest pain, no palpitations, dizziness  GASTROINTESTINAL: + loss of appetite,  No abdominal or epigastric pain. No nausea, vomiting; No diarrhea or constipation.   GENITOURINARY: Koehler catheter intact   MUSCULOSKELETAL: No joint pain or swelling; No muscle, back, or extremity pain, no upper or lower motor strength weakness, no saddle anesthesia, bowel/bladder incontinence, no falls   NEURO: No headaches, No numbness/tingling b/l LE, No weakness      PHYSICAL EXAM:  GENERAL:  Alert & Oriented X4, cooperative, NAD, Good concentration. Speech is clear.   RESPIRATORY: Respirations even and unlabored. Clear to auscultation bilaterally; No rales, rhonchi, wheezing, or rubs  CARDIOVASCULAR: Normal S1/S2, regular rate and rhythm; No murmurs, rubs, or gallops. No JVD.   GASTROINTESTINAL:  Soft, Nontender, Nondistended; Bowel sounds present  GENITOURINARY: Koehler catheter present  PERIPHERAL VASCULAR:  Extremities warm without edema. 2+ Peripheral Pulses, No cyanosis, No calf tenderness, Right neck TLC  MUSCULOSKELETAL: Motor Strength 5/5 B/L upper and lower extremities; moves all extremities equally against gravity; ROM intact; negative SLR; No tenderness on palpation of all joints.   SKIN: Warm, dry, intact. No rashes, lesions, scars or wounds.     Risk factors associated with adverse outcomes related to opioid treatment  [ ]  Concurrent benzodiazepine use  [ ]  History/ Active substance use or alcohol use disorder  [ ] Psychiatric co-morbidity  [ ] Sleep apnea  [ ] COPD  [ ] BMI> 35  [ ] Liver dysfunction  [X] Renal dysfunction  [ ] CHF  [ ] Smoker  [X]  Age > 60 years    [X]  [place]  Reviewed and Copied to Chart. See below.    Plan of care and goal oriented pain management treatment options were discussed with patient and /or primary care giver; all questions and concerns were addressed and care was aligned with patient's wishes.    Educated patient on goal oriented pain management treatment options     09-02-23 @ 14:18

## 2023-09-02 NOTE — PROGRESS NOTE ADULT - ASSESSMENT
1. JOSE ANGEL due to ATN from septic shock   CT Scan shows left mild hydroureteronephrosis with collapsed bladder.  -Scr improving to 3.9mg/dl from 4.1mg/d with increased UO. possible renal recovery.  -FeNa >1%. spot protein to creatinine ratio elevated; rpt after infection resolved.  -Adjust meds to eGFR and avoid IV Gadolinium contrast, NSAIDs, and phosphate enema.  -Monitor I/O's daily.   -Monitor SMA daily.    2. Hypotension due to septic shock  -on two pressor, weaning off.  -monitor BP.    3. Hyponatremia possible multifactorial due to hypovolemia and high ADH state with lack of free water excretion.  -Na improving to 134.  -urine lytes noted.   -Check Seurm Na daily  bid. Monitor I/O's daily. Avoid overcorrection of NA (8-10meq/day).    4. HAGMA due to Lactic Acidosis  -abg noted  -CO2 is okay.  -monitor CO2 and abg.     5. Hyperphosphatemia:  -phos is 5.1meq/L, should improve with renal recovery.  -corrected Ca is 8.3mg/dL   Discussed with hiss daughter in detail regarding the renal plan and care

## 2023-09-02 NOTE — CONSULT NOTE ADULT - PROBLEM SELECTOR RECOMMENDATION 9
62 yo M with history of prostate CA with mets to liver and bone, on Cabazitaxel q3 weeks. patient presented to the ED after a witnessed episode of loss of consciousness and fall, with head trauma. Ct head demonstrated no intracranial hemorrhage or mass. Calvarial metastases. He follows up with Oncologist Dr. Nasir Gondal. Per ISTOP pt was previously on Fentanyl patch 75 mcg/hr and Percocet.  He is currently on Fentanyl patch 25 mcg/hr and Dilaudid 1 mg IVP Q 4 hrs PRN.  Pt states that he is comfortable and pain is controlled adequately.  Pt with pain which is somatic and neuropathic in nature due to metastatic prostate cancer.    Opioid pain recommendations:  - Continue Fentanyl patch 25 mcg/hr. Monitor for sedation/ respiratory depression.   - Continue Dilaudid 1 mg IVP Q 4 hrs PRN severe pain - monitor for sedation, respiratory depression  Non-opioid pain recommendations   - Avoid NSAIDS due to renal dysfunction and the setting of increased bleeding risks  - Tylenol 1 gm po Q 8 hrs x 1 day  - monitor liver function  Bowel Regimen  - Miralax 17G PO daily  - Senna 2 tablets at bedtime for constipation  - Nystatin s/s per primary team  Mild pain   - Non-pharmacological pain treatment recommendations  - Warm/ Cool packs PRN   - Repositioning extremity, elevation, imagery, relaxation, distraction.  - Physical therapy OOB if no contraindications   Recommendations discussed with primary team and RN
-hx metastatic prostate cancer to liver and bone which is also locally advanced invading the bladder found to have mild L hydroureter in the setting of JOSE ANGEL  -presented with neutropenic fever   -ECOG now 2, was following at [facility] but per wife no further treatment being offered   -now agreeable for hospice services at home vs LTC (Pending he stabilizes and able to be downgraded from ICU)

## 2023-09-02 NOTE — PROGRESS NOTE ADULT - ASSESSMENT
This is a 62 yo M with pmhx of prostate CA with mets to liver and bone, on Cabazitaxel q3 weeks, presents to the ED for generalized weakness and pain admitted to ICU for septic shock 2/2 neutropenic fever and urosepsis    DX:  1. Septic Shock  2. Neutropenic fever  3. JOSE ANGEL  4. Lactic acidosis- now resolved  5. Tachypnea  6. Sinus tachycardia  7. Schistocytes  8. Thrombocytopenia  9. Elevated troponins  10. Back pain  =================== Neuro============================  Alert and oriented x 2-3 at not base line ( A and O x 3)  Likely 2/2 sepsis      ================= Cardiovascular==========================  #Sinus Tachycardia  Likely in setting of sepsis and dehydration    # Cardiogenic Shock   Troponin trending down 2094>1891  EKG- NSR  hypotension: on levophed 0.19. d/c  vasopressin  TTE-EF 15-20% Severe global left ventricular systolic dysfunction, Grade I diastolic dysfunction  F/U cardiology Dr. Farah consulted for elevated trops and further vasopressor treatment  ================- Pulm=================================  #Tachypnea  Likely 2/2 lactic acidosis  c/w bipap for PRN  f/u chest xray  ==================ID===================================  #Septic Shock  meropenam day 2  s/p 3L LR and 2L NS (total 5L)  patient w/ chronic steroid use, will give hydrocortisone 200 x1, and 50 q6hrs  hypotension: levophed and dobutamine. d/c vasopressin  f/u ucx   Infectious Dx Paul following    #Neutropenic fever- now afebrile  With ANC of 25  WBC trending up- 0.05->0.20  On Fligrastim inj sc OD  meropenam day2/8  s/p vanc trough WNL  Heme/Onc QMA consulted (8/31)Dr. Casper   ================= Nephro================================  #JOSE ANGEL  Cr 3.55 on admission->4.13(8/31/23)  CT A/P Mild left hydroureteronephrosis extending to the collapsed bladder  Urine lytes- WNL  f/u UCx  Urology consulted- Dr. Goldsmith    #Lactic Acidosis now resolved  Lactate 4.4---> 1.7  In setting of hypoperfusion  s/p 5L (3LR 2NS)    =================GI====================================  #Diarrhea  had 3 episodes of diarrhea in ED, 6episodes (9/1)  Likely 2/2 chemotherapy use  ordered Stool cx, gi pcr, stool O&P    ================ Heme==================================  #Neutropenia  As above    #Schistocytes. HUS vs TTP  thrombocytopenia (no active bleeding)  JOSE ANGEL     #Thrombocytopenia  Plt trending down-19-->9  s/p 1 unit Platelet transfusion (8/31)  to transfuse 2u platelets (9/1)  consent obtained from wife  f/u ADAMTS 13    #anemia  to transfuse 2u prbc  =================Endocrine===============================  No issues  ================= MSK============================  #Back pain 2/2 bone mets  -consulted pain management  - dilaudid 0.4 qh6 prn    ================= Skin/Catheters============================  Peripheral IV's  Central line- R IJV (8/30)  Arterial line R radial (8/30)  Parisi catheter  =================Prophylaxis =============================  SCD's  Will hold off chemical ppx due to thrombocytopenia    ==================GOC==================================  FULL CODE - Rediscuss on monday (9/4)  Last discussed 9/1: Family would like to de-escalated care and thinking hospice is a possible option, but plan is to discuss again on 9/4       This is a 62 yo M with pmhx of prostate CA with mets to liver and bone, on Cabazitaxel q3 weeks, presents to the ED for generalized weakness and pain admitted to ICU for septic shock 2/2 neutropenic fever and urosepsis    DX:  1. Septic Shock  2. Neutropenic fever  3. JOSE ANGEL  4. Lactic acidosis- now resolved  5. Tachypnea  6. Sinus tachycardia  7. Schistocytes  8. Thrombocytopenia  9. Elevated troponins  10. Back pain  =================== Neuro============================  Alert and oriented x 2-3 at not base line ( A and O x 3)  Likely 2/2 sepsis      ================= Cardiovascular==========================  #Sinus Tachycardia  Likely in setting of sepsis and dehydration    # Cardiogenic Shock   Troponin trending down 2094>1891  EKG- NSR  hypotension resolved d/c levophed, dobutamine and vasopressin  TTE-EF 15-20% Severe global left ventricular systolic dysfunction, Grade I diastolic dysfunction  F/U cardiology Dr. Farah consulted for elevated trops and further vasopressor treatment  ================- Pulm=================================  #Tachypnea  Likely 2/2 lactic acidosis  c/w bipap for PRN  ==================ID===================================  #Septic Shock  meropenam day 3  s/p 3L LR and 2L NS (total 5L)  patient w/ chronic steroid use, will give hydrocortisone 200 x1, and 50 q6hrs  hypotension: levophed and dobutamine. d/c vasopressin  bcx: no growth  ucx: e.coli 10k-50k  Infectious Dx Paul following    #Neutropenic fever- now afebrile  With ANC of 25  WBC trending up- 0.05->0.20  On Fligrastim inj sc OD  meropenam day3/8  s/p vanc trough WNL  Heme/Onc QMA consulted (8/31)Dr. Casper   ================= Nephro================================  #JOSE ANGEL  Cr 3.55 on admission->4.13(8/31/23)  CT A/P Mild left hydroureteronephrosis extending to the collapsed bladder  Urine lytes- WNL  Urology consulted- Dr. Goldsmith    #Lactic Acidosis now resolved  Lactate 4.4---> 1.7  In setting of hypoperfusion  s/p 5L (3LR 2NS)    =================GI====================================  #Diarrhea  had 3 episodes of diarrhea in ED, 6episodes (9/1) none today  Likely 2/2 chemotherapy use  f/u Stool cx, gi pcr, stool O&P    ================ Heme==================================  #Neutropenia  As above    #Schistocytes. HUS vs TTP  thrombocytopenia (no active bleeding)  JOSE ANGEL     #Thrombocytopenia  Plt trending down-19-->9  s/p 3platelets to date 1u Platelet transfusion (8/31) and 2u platelets (9/1)  consent obtained from wife  f/u ADAMTS 13    #anemia  s/p 2u prbc  =================Endocrine===============================  No issues  ================= MSK============================  #Back pain 2/2 bone mets  -consulted pain management  - dilaudid 0.4 qh6 prn  - fentanyl patch    ================= Skin/Catheters============================  Peripheral IV's  Central line- R IJV (8/30)  Arterial line R radial (8/30)  Parisi catheter  =================Prophylaxis =============================  SCD's  Will hold off chemical ppx due to thrombocytopenia    ==================GOC==================================  FULL CODE - Rediscuss on monday (9/4)  Last discussed 9/1: Family would like to de-escalated care and thinking hospice is a possible option, but plan is to discuss again on 9/4       This is a 64 yo M with pmhx of prostate CA with mets to liver and bone, on Cabazitaxel q3 weeks, presents to the ED for generalized weakness and pain admitted to ICU for septic shock 2/2 neutropenic fever and urosepsis    DX:  1. Septic Shock  2. Neutropenic fever  3. JOSE ANGEL  4. Lactic acidosis- now resolved  5. Tachypnea  6. Sinus tachycardia  7. Schistocytes  8. Thrombocytopenia  9. Elevated troponins  10. Back pain  =================== Neuro============================  Alert and oriented x 2-3 at not base line ( A and O x 3)  Likely 2/2 sepsis      ================= Cardiovascular==========================  #Sinus Tachycardia  Likely in setting of sepsis and dehydration    # Cardiogenic Shock   Troponin trending down 2094>1891  EKG- NSR  hypotension resolved d/c levophed, dobutamine and vasopressin  TTE-EF 15-20% Severe global left ventricular systolic dysfunction, Grade I diastolic dysfunction  F/U cardiology Dr. Farah consulted for elevated trops and further vasopressor treatment  ================- Pulm=================================  #Tachypnea  Likely 2/2 lactic acidosis  c/w bipap for PRN  ==================ID===================================  #Septic Shock  meropenam day 3  s/p 3L LR and 2L NS (total 5L)  patient w/ chronic steroid use, will give hydrocortisone 200 x1, and 50 q6hrs  hypotension: levophed and dobutamine. d/c vasopressin  bcx: no growth  ucx: e.coli 10k-50k  dr. ventura advised no need for vancomycin  Infectious Dx Paul following    #Neutropenic fever- now afebrile  With ANC of 25  WBC trending up- 0.05->0.20  On Fligrastim inj sc OD  meropenam day3/8  dr. ventura advised no need for vancomycin  Heme/Onc QMA consulted (8/31)Dr. Casper   ================= Nephro================================  #JOSE ANGEL  Cr 3.55 on admission->4.13(8/31/23)  CT A/P Mild left hydroureteronephrosis extending to the collapsed bladder  Urine lytes- WNL  Urology consulted- Dr. Goldsmith    #Lactic Acidosis now resolved  Lactate 4.4---> 1.7  In setting of hypoperfusion  s/p 5L (3LR 2NS)    =================GI====================================  #Diarrhea  had 3 episodes of diarrhea in ED, 6episodes (9/1) none today  Likely 2/2 chemotherapy use  f/u Stool cx, gi pcr, stool O&P    ================ Heme==================================  #Neutropenia  As above    #Schistocytes. HUS vs TTP  thrombocytopenia (no active bleeding)  JOSE ANGEL     #Thrombocytopenia  Plt trending down-19-->9  s/p 3platelets to date 1u Platelet transfusion (8/31) and 2u platelets (9/1)  consent obtained from wife  f/u ADAMTS 13    #anemia  s/p 2u prbc  =================Endocrine===============================  No issues  ================= MSK============================  #Back pain 2/2 bone mets  -consulted pain management  - dilaudid 0.4 qh6 prn  - fentanyl patch    ================= Skin/Catheters============================  Peripheral IV's  Central line- R IJV (8/30)  Arterial line R radial (8/30)  Parisi catheter  =================Prophylaxis =============================  SCD's  Will hold off chemical ppx due to thrombocytopenia    ==================GOC==================================  FULL CODE - Rediscuss on monday (9/4)  Last discussed 9/1: Family would like to de-escalated care and thinking hospice is a possible option, but plan is to discuss again on 9/4       This is a 62 yo M with pmhx of prostate CA with mets to liver and bone, on Cabazitaxel q3 weeks, presents to the ED for generalized weakness and pain admitted to ICU for septic shock 2/2 neutropenic fever and urosepsis    DX:  1. Septic Shock  2. Neutropenic fever  3. JOSE ANGEL  4. Lactic acidosis- now resolved  5. Tachypnea  6. Sinus tachycardia  7. Schistocytes  8. Thrombocytopenia  9. Elevated troponins  10. Back pain  =================== Neuro============================  Alert and oriented x 2-3 at not base line ( A and O x 3)  Likely 2/2 sepsis      ================= Cardiovascular==========================  #Sinus Tachycardia  Likely in setting of sepsis and dehydration    # Cardiogenic Shock   Troponin trending down 2094>1891  EKG- NSR  hypotension resolved d/c levophed, dobutamine and vasopressin  TTE-EF 15-20% Severe global left ventricular systolic dysfunction, Grade I diastolic dysfunction  F/U cardiology Dr. Farah consulted for elevated trops and further vasopressor treatment  ================- Pulm=================================  #Tachypnea  Likely 2/2 lactic acidosis  c/w bipap for PRN  ==================ID===================================  #Septic Shock  started Rocephin 1g qd day 1/7  s/p 3L LR and 2L NS (total 5L)  patient w/ chronic steroid use, will give hydrocortisone 200 x1, and 50 q6hrs  hypotension: levophed s/p dobutamine and vasopressin  bcx: no growth  ucx: e.coli 10k-50k  s/p meropenam day 3  dr. ventura advised no need for vancomycin  Infectious Dx Paul following    #Neutropenic fever- now afebrile  With ANC of 25  WBC trending up- 0.05->0.20  On Fligrastim inj sc OD  Heme/Onc QMA consulted (8/31)Dr. Casper   ================= Nephro================================  #JOSE ANGEL  Cr 3.55 on admission->4.13(8/31/23)  CT A/P Mild left hydroureteronephrosis extending to the collapsed bladder  Urine lytes- WNL  Urology consulted- Dr. Goldsmith    #Lactic Acidosis now resolved  Lactate 4.4---> 1.7  In setting of hypoperfusion  s/p 5L (3LR 2NS)    =================GI====================================  #Diarrhea  had 3 episodes of diarrhea in ED, 6episodes (9/1) none today  Likely 2/2 chemotherapy use  f/u Stool cx, gi pcr, stool O&P    ================ Heme==================================  #Neutropenia  As above    #Schistocytes. HUS vs TTP  thrombocytopenia (no active bleeding)  JOSE ANGEL     #Thrombocytopenia  Plt trending down-19-->9  s/p 3platelets to date 1u Platelet transfusion (8/31) and 2u platelets (9/1)  consent obtained from wife  f/u ADAMTS 13    #anemia  s/p 2u prbc  =================Endocrine===============================  No issues  ================= MSK============================  #Back pain 2/2 bone mets  -consulted pain management  - dilaudid 0.4 qh6 prn  - fentanyl patch    ================= Skin/Catheters============================  Peripheral IV's  Central line- R IJV (8/30)  Arterial line R radial (8/30)  Parisi catheter  =================Prophylaxis =============================  SCD's  Will hold off chemical ppx due to thrombocytopenia    ==================GOC==================================  FULL CODE - Rediscuss on monday (9/4)  Last discussed 9/1: Family would like to de-escalated care and thinking hospice is a possible option, but plan is to discuss again on 9/4

## 2023-09-02 NOTE — CONSULT NOTE ADULT - REASON FOR ADMISSION
Septic Shock 2/2 neutropenic fever

## 2023-09-02 NOTE — PROGRESS NOTE ADULT - ATTENDING COMMENTS
This is a 63 yr old man with  prostate CA with mets to liver and bone, on Cabazitaxel q3 weeks, presents to the ED for generalized weakness and pain admitted to ICU for septic shock 2/2 neutropenic fever.           ASSESSMENT     -  Septic Shock  -  Neutropenic fever  -  Pancytopenia   -  JOSE ANGEL  -  Lactic acidosis  - Tachypnea  - Sinus tachycardia  - New onset afib       Plan     - O2 supp as needed   - Fentenyl IVP   - Continue Meropenem   - Cultures neg for now   - Diarrhea  resolved   - Diet   - Neupogen , WBC improving   - Heme / Onc on case  - Hemodynamic support   - Off dobutamine and pressors   - Cards following  noted   - Titrate vasopressors to maintain MAP>65  - S/P > 4.5 L IVF  - Steroids for refractory shock   - OOB to chair   - PT
This is a 63 yr old man with  prostate CA with mets to liver and bone, on Cabazitaxel q3 weeks, presents to the ED for generalized weakness and pain admitted to ICU for septic shock 2/2 neutropenic fever.           ASSESSMENT     -  Septic Shock  -  Neutropenic fever  -  Pancytopenia   -  JOSE ANGEL  -  Lactic acidosis  - Tachypnea  - Sinus tachycardia  - New onset afib       Plan     - O2 supp as needed   - Fentenyl IVP   - Continue Meropenem and vanco iv  - Cultures neg for now   - Diarrhea  resolved   - Diet   - Neupogen   - Heme / Onc on case  - Hemodynamic support   - Dobutamin started   - EKG  - TSH   - Titrate vasopressors to maintain MAP>65  - S/P > 4.5 L IVF  - Steroids for refractory shock   - Lactate elevated ..monitor   - Transfuse PLT
This is a 63 yr old man with  prostate CA with mets to liver and bone, on Cabazitaxel q3 weeks, presents to the ED for generalized weakness and pain admitted to ICU for septic shock 2/2 neutropenic fever.           ASSESSMENT     -  Septic Shock  -  Neutropenic fever  -  Pancytopenia   -  JOSE ANGEL  -  Lactic acidosis  - Tachypnea  - Sinus tachycardia      Plan     - O2 supp as needed   - Fentenyl IVP   - Change antibx to Meropenem   - S/P I dose iv vanco   - Cultures   - Diarrhea .. check for C diff  - Check vanco level tomorr , JOSE ANGEL   - NPO   - Neupogen   - Heme / Onc eval   - Hemodynamic support   - Titrate vasopressors to maintain MAP>65  - S/P > 4.5 L IVF  - Steroids for refractory shock   - Lactate elevated ..monitor   - Bicarb for metabolic acidosis .. IVP for conservation of volume

## 2023-09-02 NOTE — CONSULT NOTE ADULT - ASSESSMENT
1. JOSE ANGEL due to ATN from septic shock   CT Scan shows left mild hydroureteronephrosis with collapsed bladder.  -s/p 2L RL given in and started on pressor. Off IVFs because thrid spacing. If scr continues to worsen and anuric then may need to consider HD  -recommend: urinalysis; FeNa >1%. spot protein to creatinine ratio elevated; rpt after infection resolved.  -Adjust meds to eGFR and avoid IV Gadolinium contrast, NSAIDs, and phosphate enema.  -Monitor I/O's daily.   -Monitor SMA daily.  2. Hypotension due to septic shock  -on two pressor  -monitor BP.  3. Hyponatremia possible multifactorial due to hypovolemia and high ADH state with lack of free water excretion.  -Na around 128.   -check uric acid level. urine lytes noted.   -Check Seurm Na bid. Monitor I/O's daily. Avoid overcorrection of NA (8-10meq/day)  4. HAGMA due to Lactic Acidosis  -abg noted  -if no improvement then consider bicarbonate drip (3amps)  -monitor CO2 and abg.     Patient is critically ill. Time Spent: 35mins.  Discussed with patient and family at bedside in detail regarding the renal plan and care  Discussed the assessment and plan with ICU Team/Nurse      
Confidential Drug Utilization Report  Search Terms: ESTEBAN BIRD, 1960 Search Date: 09/02/2023 12:22:58 PM  The Drug Utilization Report below displays all of the controlled substance prescriptions, if any, that your patient has filled in the last twelve months. The information displayed on this report is compiled from pharmacy submissions to the Department, and accurately reflects the information as submitted by the pharmacies.    This report was requested by: Eveline Rose | Reference #: [id number]    You have not added a MARIA INES number. Keeping your MARIA INES number(s) up to date on the My MARIA INES # tab will enable the separation of your prescriptions from others in the search results.    Practitioner Count: 0  Pharmacy Count: 0  Current Opioid Prescriptions: 0  Current Benzodiazepine Prescriptions: 0  Current Stimulant Prescriptions: 0      Patient Demographic Information (PDI)       PDI	First Name	Last Name	Birth Date	Gender	Street Address	[facility] Code  A	Esteban Bird	1960	Male	[address]	[place]	[id number]    Prescription Information      PDI Filter:    PDI	Current Rx	Drug Type	Rx Written	Rx Dispensed	Drug	Quantity	Days Supply	Prescriber Name	Prescriber MARIA INES #	Payment Method	Dispenser  A	N	O	03/10/2023	03/28/2023	fentanyl 75 mcg/hr patch	10	30	Dick Jack	[id number]	Insurance	[id number]  A	N	O	02/13/2023	02/13/2023	oxycodone-acetaminophen  mg tab	120	30	GondalJuancho MD	[id number]	Insurance	[id number]  A	N	O	11/21/2022	11/21/2022	tramadol hcl 50 mg tablet	20	6	GondalJuancho MD	[id number]	Insurance	[id number]  A	N	O	10/31/2022	10/31/2022	oxycodone-acetaminophen  mg tab	120	30	GondalJuancho MD	[id number]	Insurance	[id number]  A	N	O	09/06/2022	09/06/2022	oxycodone-acetaminophen  mg tab	120	30	GondalJuancho MD	[id number]	Insurance	[id number]    * - Details of Drug Type : O = Opioid, B = Benzodiazepine, S = Stimulant    * - Drugs marked with an asterisk are compound drugs. If the compound drug is made up of more than one controlled substance, then each controlled substance will be a separate row in the table.    
64yo M with metastatic prostate cancer which is also locally advanced invading the bladder found to have mild L hydroureter in the setting of JOSE ANGEL. Patients JOSE ANGEL is likely all secondary to shock and ATN but could also have an element of post-renal given L hydroureter. Obstruction from hydroureter is likely 2/2 locally advanced disease causing malignant obstruction.    Recommendations  - Repeat RBUS  - If hydronephrosis is still evidence in L kidney would consider placing L PCN for hydronephrosis 2/2 malignant obstruction. Should have discussion with nephrology regarding how much of patients JOSE ANGEL is 2/2 hydronephrosis given that it is mild on imaging and he has more prominent reasons for having an JOSE ANGEL.   - Discussed with Dr. Goldsmith
Pancytopenia- Sepsis  Undoubtedly due side  effects of chemotherapy- Cabaxitaxel    Currently on pressors, and also S/P PRBC transfusions  Wife and patient also report urethral  bleeding  Clear urine noted in bag- Need Urologist F/U  Continue GCSF  F/U platelets  If they fall below current level transfuse platelets  Continue antibiotics
Septic shock  UTI  Neutropenia  ? Pneumonia - seems less likely      Plan - Cont Meropenem 500mgs iv q12hrs for now  Await culture results

## 2023-09-02 NOTE — CONSULT NOTE ADULT - CONSULT REQUESTED DATE/TIME
31-Aug-2023 14:22
01-Sep-2023 13:38
02-Sep-2023 12:23
30-Aug-2023 16:09
30-Aug-2023 18:31
01-Sep-2023 10:33
30-Aug-2023

## 2023-09-02 NOTE — PROGRESS NOTE ADULT - SUBJECTIVE AND OBJECTIVE BOX
pt on 2 l  N/c . On 1.5 mcg of    weaned off pressor     calamine/zinc oxide Lotion 1 Application(s) Topical two times a day PRN  chlorhexidine 2% Cloths 1 Application(s) Topical daily  DOBUTamine Infusion 1.5 MICROgram(s)/kG/Min IV Continuous <Continuous>  fentaNYL   Patch  25 MICROgram(s)/Hr 1 Patch Transdermal every 72 hours  filgrastim-sndz (ZARXIO) Injectable 480 MICROGram(s) SubCutaneous daily  hydrocortisone sodium succinate Injectable 50 milliGRAM(s) IV Push every 6 hours  HYDROmorphone  Injectable 1 milliGRAM(s) IV Push every 4 hours PRN  lidocaine 2% Jelly 5 milliLiter(s) IntraUrethral every 12 hours PRN  melatonin 3 milliGRAM(s) Oral at bedtime  meropenem  IVPB      meropenem  IVPB 500 milliGRAM(s) IV Intermittent every 12 hours  nystatin    Suspension 444137 Unit(s) Swish and Swallow four times a day                            8.5    0.94  )-----------(        ( 02 Sep 2023 03:59 )             24.8       Hemoglobin: 8.5 g/dL ( @ 03:59)  Hemoglobin: 7.1 g/dL ( @ 04:57)  Hemoglobin: 7.4 g/dL ( @ 16:10)  Hemoglobin: 8.0 g/dL ( @ 04:18)  Hemoglobin: 8.5 g/dL ( @ 14:21)          133<L>  |  105  |  84<H>  ----------------------------<  107<H>  3.9   |  16<L>  |  3.89<H>    Ca    6.5<LL>      02 Sep 2023 03:59  Phos  5.4       Mg     2.3         TPro  6.1  /  Alb  1.8<L>  /  TBili  0.9  /  DBili  x   /  AST  16  /  ALT  19  /  AlkPhos  145<H>      Creatinine Trend: 3.89<--, 3.96<--, 4.00<--, 4.13<--, 4.29<--, 4.19<--    COAGS:     CARDIAC MARKERS ( 31 Aug 2023 14:02 )  x     / x     / 87 U/L / x     / 12.0 ng/mL  CARDIAC MARKERS ( 30 Aug 2023 14:21 )  x     / x     / 25 U/L / x     / <1.0 ng/mL        T(C): 36.2 (23 @ 04:00), Max: 36.8 (23 @ 19:00)  HR: 90 (23 @ 07:00) (90 - 113)  BP: --  RR: 11 (23 @ 07:00) (9 - 25)  SpO2: 99% (23 @ 07:00) (98% - 100%)  Wt(kg): --    I&O's Summary    01 Sep 2023 07:01  -  02 Sep 2023 07:00  --------------------------------------------------------  IN: 1554 mL / OUT: 1700 mL / NET: -146 mL      Appearance: Normal appearing adult male in no acute distress  HEENT:   Normal oral mucosa, PERRL, EOMI	  Lymphatic: No lymphadenopathy , no edema  Cardiovascular: Normal S1 S2, No JVD, No murmurs , Peripheral pulses palpable 2+ bilaterally. Right IJ central line.  Respiratory: Lungs clear to auscultation, normal effort 	  Gastrointestinal:  Soft, mildly tender + BS	  Skin: No rashes, No ecchymoses, No cyanosis, cool to touch  Musculoskeletal: Normal range of motion, normal strength  Psychiatry:  Mood & affect appropriate      TELEMETRY: sinus tachycardia, short burst of NSVT	    ECG: sinus rhythm. 	    Echo:  DIMENSIONS:  Dimensions:     Normal Values:  LA:     3.9 cm    2.0 - 4.0 cm  Ao:     3.6 cm    2.0 - 3.8 cm  SEPTUM: 1.1 cm    0.6 - 1.2 cm  PWT:    0.8 cm    0.6 - 1.1 cm  LVIDd:  4.8 cm    3.0 - 5.6 cm  LVIDs:  4.4 cm    1.8 - 4.0 cm      Derived Variables:  LVMI: 76 g/m2  RWT: 0.33  Ejection Fraction Visual Estimate: 15-20 %  Ejection Fraction Reid: 20 %    ------------------------------------------------------------------------  OBSERVATIONS:  Mitral Valve: Normal mitral valve. Mild mitral  regurgitation.  Aortic Root: Aortic Root: 3.6 cm.    Aortic Valve: Normal trileaflet aortic valve.  Left Atrium: Normal left atrium.  LA volume index = 21  cc/m2.  Left Ventricle: Severe global left ventricular systolic  dysfunction. Ultrasound LV opacification agent was used to  enhance endocardial definition. Severe global hypokinesia.  Normal left ventricular internal dimensions and wall  thicknesses. Grade I diastolic dysfunction (Impaired  relaxation, mild).  Right Heart: Normal right atrium. Normal right ventricular  size and systolic function (TAPSE 1.9 cm). There is mild  tricuspid regurgitation. There is mild pulmonic  regurgitation.  Pericardium/PleuraNormal pericardium with no pericardial  effusion.  Hemodynamic: RV systolic pressure is normal at  33 mm Hg.    	  LABS:	 	                          7.1    0.27  )-----------( 9        ( 01 Sep 2023 04:57 )             21.1         134<L>  |  104  |  72<H>  ----------------------------<  134<H>  4.4   |  15<L>  |  3.96<H>    Ca    6.5<LL>      01 Sep 2023 04:57  Phos  5.1       Mg     2.3         TPro  6.0  /  Alb  1.7<L>  /  TBili  0.8  /  DBili  x   /  AST  19  /  ALT  18  /  AlkPhos  104      ASSESSMENT/PLAN: Mr Carrasco is a pleasant 63y Male here with neutropenic fever and mixed shock picture: septic + cardiogenic.  He has newly diagnosed congestive heart failure, with severe cardiomyopathy, EF 15-20%, LV nondilated.  He has acute on chronic CKD- his baseline Creat was 1, last year.  This admission, Creat 3.5-4.  Unknown interim values.  He has a right IJ CVL,without complications.  He is breathing room air spontaneously and looks comfortable.  FloTrack adjusted re: calibration of the CVP.  Use a level to adjust the CVP to the level of the mid-chest / mid-axillary line.  At time of evaluation, CVP 6-8, CO 7LPM, CI 3, -880.    cont low dose Inotropic support     He remains a bit vasodilated, and his CO remains elevated in the setting of septic shock.  Continue broad spectrum antibiotics per ICU plan of care.  Will help adjust CHF GDMT once he is out of shock state.  Maintain K 4-4.5, Mg 2 to prevent arrhythmia.  From a CV perspective, he is high risk for invasive procedures, with new Dx of CHF.  He is stabilized on inotropes/pressors.  He has a very low platelet count, which increases the likelihood of serious bleeding during solid-organ puncture.    Long term - Denosumab can cause CHF.  Erleada and Denosumab can also accelerate/destablize coronary artery disease.  Should adjust his Onc plan accordingly.  Palliative care weighing in on prognosis and symptom management.   pt on 2 l  N/c . On 1.5 mcg of    weaned off pressor     calamine/zinc oxide Lotion 1 Application(s) Topical two times a day PRN  chlorhexidine 2% Cloths 1 Application(s) Topical daily  DOBUTamine Infusion 1.5 MICROgram(s)/kG/Min IV Continuous <Continuous>  fentaNYL   Patch  25 MICROgram(s)/Hr 1 Patch Transdermal every 72 hours  filgrastim-sndz (ZARXIO) Injectable 480 MICROGram(s) SubCutaneous daily  hydrocortisone sodium succinate Injectable 50 milliGRAM(s) IV Push every 6 hours  HYDROmorphone  Injectable 1 milliGRAM(s) IV Push every 4 hours PRN  lidocaine 2% Jelly 5 milliLiter(s) IntraUrethral every 12 hours PRN  melatonin 3 milliGRAM(s) Oral at bedtime  meropenem  IVPB      meropenem  IVPB 500 milliGRAM(s) IV Intermittent every 12 hours  nystatin    Suspension [id number] Unit(s) Swish and Swallow four times a day                            8.5    0.94  )-----------(        ( 02 Sep 2023 03:59 )             24.8       Hemoglobin: 8.5 g/dL ( @ 03:59)  Hemoglobin: 7.1 g/dL ( @ 04:57)  Hemoglobin: 7.4 g/dL ( @ 16:10)  Hemoglobin: 8.0 g/dL ( @ 04:18)  Hemoglobin: 8.5 g/dL ( @ 14:21)          133<L>  |  105  |  84<H>  ----------------------------<  107<H>  3.9   |  16<L>  |  3.89<H>    Ca    6.5<LL>      02 Sep 2023 03:59  Phos  5.4       Mg     2.3         TPro  6.1  /  Alb  1.8<L>  /  TBili  0.9  /  DBili  x   /  AST  16  /  ALT  19  /  AlkPhos  145<H>      Creatinine Trend: 3.89<--, 3.96<--, 4.00<--, 4.13<--, 4.29<--, 4.19<--    COAGS:     CARDIAC MARKERS ( 31 Aug 2023 14:02 )  x     / x     / 87 U/L / x     / 12.0 ng/mL  CARDIAC MARKERS ( 30 Aug 2023 14:21 )  x     / x     / 25 U/L / x     / <1.0 ng/mL        T(C): 36.2 (23 @ 04:00), Max: 36.8 (23 @ 19:00)  HR: 90 (23 @ 07:00) (90 - 113)  BP: --  RR: 11 (23 @ 07:00) (9 - 25)  SpO2: 99% (23 @ 07:00) (98% - 100%)  Wt(kg): --    I&O's Summary    01 Sep 2023 07:01  -  02 Sep 2023 07:00  --------------------------------------------------------  IN: 1554 mL / OUT: 1700 mL / NET: -146 mL      Appearance: Normal appearing adult male in no acute distress  HEENT:   Normal oral mucosa, PERRL, EOMI	  Lymphatic: No lymphadenopathy , no edema  Cardiovascular: Normal S1 S2, No JVD, No murmurs , Peripheral pulses palpable 2+ bilaterally. Right IJ central line.  Respiratory: Lungs clear to auscultation, normal effort 	  Gastrointestinal:  Soft, mildly tender + BS	  Skin: No rashes, No ecchymoses, No cyanosis, cool to touch  Musculoskeletal: Normal range of motion, normal strength  Psychiatry:  Mood & affect appropriate      TELEMETRY: sinus tachycardia, short burst of NSVT	    ECG: sinus rhythm. 	    Echo:  DIMENSIONS:  Dimensions:     Normal Values:  LA:     3.9 cm    2.0 - 4.0 cm  Ao:     3.6 cm    2.0 - 3.8 cm  SEPTUM: 1.1 cm    0.6 - 1.2 cm  PWT:    0.8 cm    0.6 - 1.1 cm  LVIDd:  4.8 cm    3.0 - 5.6 cm  LVIDs:  4.4 cm    1.8 - 4.0 cm      Derived Variables:  LVMI: 76 g/m2  RWT: 0.33  Ejection Fraction Visual Estimate: 15-20 %  Ejection Fraction Reid: 20 %    ------------------------------------------------------------------------  OBSERVATIONS:  Mitral Valve: Normal mitral valve. Mild mitral  regurgitation.  Aortic Root: Aortic Root: 3.6 cm.    Aortic Valve: Normal trileaflet aortic valve.  Left Atrium: Normal left atrium.  LA volume index = 21  cc/m2.  Left Ventricle: Severe global left ventricular systolic  dysfunction. Ultrasound LV opacification agent was used to  enhance endocardial definition. Severe global hypokinesia.  Normal left ventricular internal dimensions and wall  thicknesses. Grade I diastolic dysfunction (Impaired  relaxation, mild).  Right Heart: Normal right atrium. Normal right ventricular  size and systolic function (TAPSE 1.9 cm). There is mild  tricuspid regurgitation. There is mild pulmonic  regurgitation.  Pericardium/PleuraNormal pericardium with no pericardial  effusion.  Hemodynamic: RV systolic pressure is normal at  33 mm Hg.      ASSESSMENT/PLAN: Mr Carrasco is a pleasant 63y Male here with neutropenic fever and mixed shock picture: septic + cardiogenic.  He has newly diagnosed congestive heart failure, with severe cardiomyopathy, EF 15-20%, LV nondilated.  He has acute on chronic CKD- his baseline Creat was 1, last year.  This admission, Creat 3.5-4.  Unknown interim values.  He has a right IJ CVL,without complications.    Neck veins are flat. OK w/ no add'l diuretics.  Off of FloTrack system.  On Dobutamine, limbs are quite warm (suggests vasodilation).  As he is stable on a LOW DOSE of , it may be a moot point of whether he would do better on an inotrope or pressor.  Can wean this as tolerated.    Long term - Denosumab can cause CHF.  Erleada and Denosumab can also accelerate/destablize coronary artery disease.  Should adjust his Onc plan accordingly.  Palliative care weighing in on prognosis and symptom management.

## 2023-09-02 NOTE — CHART NOTE - NSCHARTNOTEFT_GEN_A_CORE
This is a 62 yo M with pmhx of prostate CA with mets to liver and bone, on Cabazitaxel q3 weeks, presents to the ED for generalized weakness and pain. Per wife patient had an episode of loss of consciousness and fall, with head trauma. Patient also mentions having dysuria since before admission. He started this chemo regimen in march, and this is the first time he has had such symptoms. He follows up with Oncologist Dr. Nasir Gondal. He received his last chemo treatment 1 week ago. He was admitted to ICU for septic shock 2/2  urosepsis vs cardiogenic shock, febrile neutropenia.  In the ICU, RIJ central line and arterial line was placed, for his hypotension. During his time in the ICU he was on 3 pressor Levophed, Vasopressin and Dobutamine and has been titrated off of all 3 on 9/2.   His blood cultures show no growth to date, urine culture revealed e.coli 10,000-49,000 in count. He started on meropenam as of 9/2 day3/8. Today (9/2) Dr. Miller advised to switch meropenam to rocephin 1g qd for 7 days to cover uti.  Patient complained of substernal chest pain for a day non radiating. 2x trops were elevated, EKG NSR, repeat troponin shows downgrading trops. PERCY shows EF: 15-20% this likely due chemotherapy side effects. Chest pain is likely due to demand on the heart from severe anemia and it has resolved. Patient is hemodynamically stable.   Patient has been afebrile, his leukocyte count has been slowly increasing with filgastrim, he received 2 units prbc and 3 units platelets, both still low but increasing.   Patient had multiple episodes of diarrhea on alternative nights during his time in the ICU, stool study was sent and is pending results. Patient's JOSE ANGEL is slowly resolving possibly due to volume loss secondary to the multiple episodes of diarrhea. GOC has been discussed with family they are leaning towards hospice, discussion to be revisited on monday (9/4).    Cardiology- Dr. Farah-uptrending trops- pressors  Hemonc-- c/w filgastrim  Infx disease- - ABx  Urology- Dr. Goldsmith- [facility]    Follow up:  - monitor cbc  - revisit goal of care  - c/w rocephin This is a 64 yo M with pmhx of prostate CA with mets to liver and bone, on Cabazitaxel q3 weeks, presents to the ED for generalized weakness and pain. Per wife patient had an episode of loss of consciousness and fall, with head trauma. Patient also mentions having dysuria since before admission. He started this chemo regimen in march, and this is the first time he has had such symptoms. He follows up with Oncologist Dr. Nasir Gondal. He received his last chemo treatment 1 week ago. He was admitted to ICU for septic shock 2/2  urosepsis vs cardiogenic shock, febrile neutropenia.  In the ICU, RIJ central line and arterial line was placed, for his hypotension. During his time in the ICU he was on 3 pressor Levophed, Vasopressin and Dobutamine and has been titrated off of all 3 on 9/2.   His blood cultures show no growth to date, urine culture revealed e.coli 10,000-49,000 in count. He started on meropenam as of 9/2 day3/8. Today (9/2) Dr. Miller advised to switch meropenam to rocephin 1g qd for 7 days to cover uti.  Patient complained of substernal chest pain for a day non radiating. 2x trops were elevated, EKG NSR, repeat troponin shows downgrading trops. PERCY shows EF: 15-20% this likely due chemotherapy side effects. Chest pain is likely due to demand on the heart from severe anemia and it has resolved. Patient is hemodynamically stable.   Patient has been afebrile, his leukocyte count has been slowly increasing with filgastrim, he received 2 units prbc and 3 units platelets, both still low but increasing.   Patient had multiple episodes of diarrhea on alternative nights during his time in the ICU, stool study was sent and is pending results. Patient's JOSE ANGEL is slowly resolving possibly due to volume loss secondary to the multiple episodes of diarrhea. GOC has been discussed with family they are leaning towards hospice, discussion to be revisited on monday (9/4).    Cardiology- Dr. Farah-uptrending trops- pressors  Hemonc-- c/w filgastrim  Infx disease- - ABx  Urology- Dr. Goldsmith- [facility]    Patient medical stable for downgrade per ICU attending.  Patient signed out to medical attending Dr. Douglass and ABHISHEK Wang.    Follow up:  - monitor cbc  - revisit goal of care  - c/w rocephin

## 2023-09-02 NOTE — PROGRESS NOTE ADULT - SUBJECTIVE AND OBJECTIVE BOX
INTERVAL HPI/OVERNIGHT EVENTS:  Seen and examined at bedside.    PRESSORS: [  ] YES [ X ] NO  WHICH:    Antimicrobial:  meropenem  IVPB 500 milliGRAM(s) IV Intermittent every 12 hours  meropenem  IVPB      nystatin    Suspension [id number] Unit(s) Swish and Swallow four times a day    Cardiovascular:  DOBUTamine Infusion 1.5 MICROgram(s)/kG/Min IV Continuous <Continuous>  norepinephrine Infusion 0.77 MICROgram(s)/kG/Min IV Continuous <Continuous>    Pulmonary:    Hematalogic:    Other:  calamine/zinc oxide Lotion 1 Application(s) Topical two times a day PRN  chlorhexidine 2% Cloths 1 Application(s) Topical daily  fentaNYL   Patch  25 MICROgram(s)/Hr 1 Patch Transdermal every 72 hours  filgrastim-sndz (ZARXIO) Injectable 480 MICROGram(s) SubCutaneous daily  hydrocortisone sodium succinate Injectable 50 milliGRAM(s) IV Push every 6 hours  HYDROmorphone  Injectable 1 milliGRAM(s) IV Push every 4 hours PRN  lidocaine 2% Jelly 5 milliLiter(s) IntraUrethral every 12 hours PRN  melatonin 3 milliGRAM(s) Oral at bedtime    calamine/zinc oxide Lotion 1 Application(s) Topical two times a day PRN  chlorhexidine 2% Cloths 1 Application(s) Topical daily  DOBUTamine Infusion 1.5 MICROgram(s)/kG/Min IV Continuous <Continuous>  fentaNYL   Patch  25 MICROgram(s)/Hr 1 Patch Transdermal every 72 hours  filgrastim-sndz (ZARXIO) Injectable 480 MICROGram(s) SubCutaneous daily  hydrocortisone sodium succinate Injectable 50 milliGRAM(s) IV Push every 6 hours  HYDROmorphone  Injectable 1 milliGRAM(s) IV Push every 4 hours PRN  lidocaine 2% Jelly 5 milliLiter(s) IntraUrethral every 12 hours PRN  melatonin 3 milliGRAM(s) Oral at bedtime  meropenem  IVPB      meropenem  IVPB 500 milliGRAM(s) IV Intermittent every 12 hours  norepinephrine Infusion 0.77 MICROgram(s)/kG/Min IV Continuous <Continuous>  nystatin    Suspension [id number] Unit(s) Swish and Swallow four times a day    Drug Dosing Weight  Height (cm): 172.7 (30 Aug 2023 03:15)  Weight (kg): 95.2 (01 Sep 2023 14:00)  BMI (kg/m2): 31.9 (01 Sep 2023 14:00)  BSA (m2): 2.09 (01 Sep 2023 14:00)    CENTRAL LINE: [ ] YES [ ] NO  LOCATION:   DATE INSERTED:  REMOVE: [ ] YES [ ] NO  EXPLAIN:    PUTNAM: [ ] YES [ ] NO    DATE INSERTED:  REMOVE:  [ ] YES [ ] NO  EXPLAIN:    A-LINE:  [ ] YES [ ] NO  LOCATION:   DATE INSERTED:  REMOVE:  [ ] YES [ ] NO  EXPLAIN:    PMH -reviewed admission note, no change since admission  PAST MEDICAL & SURGICAL HISTORY:  CA of prostate      Enlarged prostate with lower urinary tract symptoms (LUTS)      No significant past surgical history          ICU Vital Signs Last 24 Hrs  T(C): 35.9 (01 Sep 2023 23:24), Max: 36.8 (01 Sep 2023 19:00)  T(F): 96.6 (01 Sep 2023 23:24), Max: 98.2 (01 Sep 2023 19:00)  HR: 94 (02 Sep 2023 02:00) (94 - 113)  BP: --  BP(mean): --  ABP: 120/58 (02 Sep 2023 02:00) (93/66 - 142/78)  ABP(mean): 77 (02 Sep 2023 02:00) (57 - 99)  RR: 10 (02 Sep 2023 02:00) (10 - 25)  SpO2: 98% (02 Sep 2023 02:00) (98% - 100%)        ABG - ( 31 Aug 2023 15:43 )  pH, Arterial: 7.32  pH, Blood: x     /  pCO2: 28    /  pO2: 122   / HCO3: 14    / Base Excess: -10.2 /  SaO2: 100                   08-31 @ 07:01  -  09-01 @ 07:00  --------------------------------------------------------  IN: 1122.1 mL / OUT: 2500 mL / NET: -1377.9 mL      PHYSICAL EXAM:      PHYSICAL EXAM:  GENERAL: AAOx3 but delirium waxes and wanes   EYES: EOMI, PERRLA  NECK: Supple, No JVD; Normal thyroid; Trachea midline: No LAD   NERVOUS SYSTEM: Motor Strength 5/5 B/L upper and lower extremities; DTRs 2+ intact and symmetric; Tone normal; ROM full and free  CHEST/LUNG: No rales, rhonchi, wheezing, breath sounds present bilaterally  HEART: Regular rate and rhythm; No murmurs, no gallops  ABDOMEN: Soft, Nontender, distended; Bowel sounds present, no pain or masses on palpation  : Putnam in place  EXTREMITIES:  2+ Peripheral Pulses, No clubbing, cyanosis, or edema  SKIN: warm, intact, no lesions         LABS:  CBC Full  -  ( 01 Sep 2023 04:57 )  WBC Count : 0.27 K/uL  RBC Count : 2.25 M/uL  Hemoglobin : 7.1 g/dL  Hematocrit : 21.1 %  Platelet Count - Automated : 9 K/uL  Mean Cell Volume : 93.8 fl  Mean Cell Hemoglobin : 31.6 pg  Mean Cell Hemoglobin Concentration : 33.6 gm/dL  Auto Neutrophil # : 0.11 K/uL  Auto Lymphocyte # : 0.11 K/uL  Auto Monocyte # : 0.05 K/uL  Auto Eosinophil # : 0.00 K/uL  Auto Basophil # : 0.00 K/uL  Auto Neutrophil % : 40.0 %  Auto Lymphocyte % : 40.0 %  Auto Monocyte % : 20.0 %  Auto Eosinophil % : 0.0 %  Auto Basophil % : 0.0 %    09-01    134<L>  |  104  |  72<H>  ----------------------------<  134<H>  4.4   |  15<L>  |  3.96<H>    Ca    6.5<LL>      01 Sep 2023 04:57  Phos  5.1     09-01  Mg     2.3     09-01    TPro  6.0  /  Alb  1.7<L>  /  TBili  0.8  /  DBili  x   /  AST  19  /  ALT  18  /  AlkPhos  104  08-31      Urinalysis Basic - ( 01 Sep 2023 04:57 )    Color: x / Appearance: x / SG: x / pH: x  Gluc: 134 mg/dL / Ketone: x  / Bili: x / Urobili: x   Blood: x / Protein: x / Nitrite: x   Leuk Esterase: x / RBC: x / WBC x   Sq Epi: x / Non Sq Epi: x / Bacteria: x      Culture Results:   10,000 - 49,000 CFU/mL Escherichia coli (08-30 @ 12:55)      RADIOLOGY & ADDITIONAL STUDIES REVIEWED:  ***    [ ]GOALS OF CARE DISCUSSION WITH PATIENT/FAMILY/PROXY:    CRITICAL CARE TIME SPENT: 35 minutes INTERVAL HPI/OVERNIGHT EVENTS:  Seen and examined at bedside. Patient is AAOx3 but slightly delirious. Patient mentions feeling better today, no diarrhea. Patient's last bowel movement was yesterday morning. Waiting for stool studies.     PRESSORS: [  ] YES [ X ] NO  WHICH:    Antimicrobial:  meropenem  IVPB 500 milliGRAM(s) IV Intermittent every 12 hours  meropenem  IVPB      nystatin    Suspension [id number] Unit(s) Swish and Swallow four times a day    Cardiovascular:  DOBUTamine Infusion 1.5 MICROgram(s)/kG/Min IV Continuous <Continuous>  norepinephrine Infusion 0.77 MICROgram(s)/kG/Min IV Continuous <Continuous>    Pulmonary:    Hematalogic:    Other:  calamine/zinc oxide Lotion 1 Application(s) Topical two times a day PRN  chlorhexidine 2% Cloths 1 Application(s) Topical daily  fentaNYL   Patch  25 MICROgram(s)/Hr 1 Patch Transdermal every 72 hours  filgrastim-sndz (ZARXIO) Injectable 480 MICROGram(s) SubCutaneous daily  hydrocortisone sodium succinate Injectable 50 milliGRAM(s) IV Push every 6 hours  HYDROmorphone  Injectable 1 milliGRAM(s) IV Push every 4 hours PRN  lidocaine 2% Jelly 5 milliLiter(s) IntraUrethral every 12 hours PRN  melatonin 3 milliGRAM(s) Oral at bedtime    calamine/zinc oxide Lotion 1 Application(s) Topical two times a day PRN  chlorhexidine 2% Cloths 1 Application(s) Topical daily  DOBUTamine Infusion 1.5 MICROgram(s)/kG/Min IV Continuous <Continuous>  fentaNYL   Patch  25 MICROgram(s)/Hr 1 Patch Transdermal every 72 hours  filgrastim-sndz (ZARXIO) Injectable 480 MICROGram(s) SubCutaneous daily  hydrocortisone sodium succinate Injectable 50 milliGRAM(s) IV Push every 6 hours  HYDROmorphone  Injectable 1 milliGRAM(s) IV Push every 4 hours PRN  lidocaine 2% Jelly 5 milliLiter(s) IntraUrethral every 12 hours PRN  melatonin 3 milliGRAM(s) Oral at bedtime  meropenem  IVPB      meropenem  IVPB 500 milliGRAM(s) IV Intermittent every 12 hours  norepinephrine Infusion 0.77 MICROgram(s)/kG/Min IV Continuous <Continuous>  nystatin    Suspension [id number] Unit(s) Swish and Swallow four times a day    Drug Dosing Weight  Height (cm): 172.7 (30 Aug 2023 03:15)  Weight (kg): 95.2 (01 Sep 2023 14:00)  BMI (kg/m2): 31.9 (01 Sep 2023 14:00)  BSA (m2): 2.09 (01 Sep 2023 14:00)    CENTRAL LINE: [x ] YES [ ] NO  LOCATION: [facility]  DATE: 8/30  REMOVE: [ ] YES [ ] NO  EXPLAIN:    PUTNAM: [ x] YES [ ] NO    DATE INSERTED:  REMOVE:  [ ] YES [ ] NO  EXPLAIN:    A-LINE:  [x ] YES [ ] NO  LOCATION: 8/30  REMOVE:  [ ] YES [ ] NO  EXPLAIN:    PMH -reviewed admission note, no change since admission  PAST MEDICAL & SURGICAL HISTORY:  CA of prostate      Enlarged prostate with lower urinary tract symptoms (LUTS)      No significant past surgical history          ICU Vital Signs Last 24 Hrs  T(C): 35.9 (01 Sep 2023 23:24), Max: 36.8 (01 Sep 2023 19:00)  T(F): 96.6 (01 Sep 2023 23:24), Max: 98.2 (01 Sep 2023 19:00)  HR: 94 (02 Sep 2023 02:00) (94 - 113)  BP: --  BP(mean): --  ABP: 120/58 (02 Sep 2023 02:00) (93/66 - 142/78)  ABP(mean): 77 (02 Sep 2023 02:00) (57 - 99)  RR: 10 (02 Sep 2023 02:00) (10 - 25)  SpO2: 98% (02 Sep 2023 02:00) (98% - 100%)        ABG - ( 31 Aug 2023 15:43 )  pH, Arterial: 7.32  pH, Blood: x     /  pCO2: 28    /  pO2: 122   / HCO3: 14    / Base Excess: -10.2 /  SaO2: 100                   08-31 @ 07:01  -  09-01 @ 07:00  --------------------------------------------------------  IN: 1122.1 mL / OUT: 2500 mL / NET: -1377.9 mL      PHYSICAL EXAM:      PHYSICAL EXAM:  GENERAL: AAOx3 but delirium waxes and wanes   EYES: EOMI, PERRLA  NECK: Supple, No JVD; Normal thyroid; Trachea midline: No LAD   NERVOUS SYSTEM: Motor Strength 5/5 B/L upper and lower extremities; DTRs 2+ intact and symmetric; Tone normal; ROM full and free  CHEST/LUNG: No rales, rhonchi, wheezing, breath sounds present bilaterally  HEART: Regular rate and rhythm; No murmurs, no gallops  ABDOMEN: Soft, Nontender, distended; Bowel sounds present, no pain or masses on palpation  : Putnam in place, voiding well  EXTREMITIES:  2+ Peripheral Pulses, No clubbing, cyanosis, or edema  SKIN: warm, intact, no lesions         LABS:  CBC Full  -  ( 01 Sep 2023 04:57 )  WBC Count : 0.27 K/uL  RBC Count : 2.25 M/uL  Hemoglobin : 7.1 g/dL  Hematocrit : 21.1 %  Platelet Count - Automated : 9 K/uL  Mean Cell Volume : 93.8 fl  Mean Cell Hemoglobin : 31.6 pg  Mean Cell Hemoglobin Concentration : 33.6 gm/dL  Auto Neutrophil # : 0.11 K/uL  Auto Lymphocyte # : 0.11 K/uL  Auto Monocyte # : 0.05 K/uL  Auto Eosinophil # : 0.00 K/uL  Auto Basophil # : 0.00 K/uL  Auto Neutrophil % : 40.0 %  Auto Lymphocyte % : 40.0 %  Auto Monocyte % : 20.0 %  Auto Eosinophil % : 0.0 %  Auto Basophil % : 0.0 %    09-01    134<L>  |  104  |  72<H>  ----------------------------<  134<H>  4.4   |  15<L>  |  3.96<H>    Ca    6.5<LL>      01 Sep 2023 04:57  Phos  5.1     09-01  Mg     2.3     09-01    TPro  6.0  /  Alb  1.7<L>  /  TBili  0.8  /  DBili  x   /  AST  19  /  ALT  18  /  AlkPhos  104  08-31      Urinalysis Basic - ( 01 Sep 2023 04:57 )    Color: x / Appearance: x / SG: x / pH: x  Gluc: 134 mg/dL / Ketone: x  / Bili: x / Urobili: x   Blood: x / Protein: x / Nitrite: x   Leuk Esterase: x / RBC: x / WBC x   Sq Epi: x / Non Sq Epi: x / Bacteria: x      Culture Results:   10,000 - 49,000 CFU/mL Escherichia coli (08-30 @ 12:55)      RADIOLOGY & ADDITIONAL STUDIES REVIEWED:  ***    [ ]GOALS OF CARE DISCUSSION WITH PATIENT/FAMILY/PROXY:    CRITICAL CARE TIME SPENT: 35 minutes INTERVAL HPI/OVERNIGHT EVENTS:  Seen and examined at bedside. Patient is AAOx3 but slightly delirious. Patient mentions feeling better today, no diarrhea. Patient's last bowel movement was yesterday morning. Waiting for stool studies. Dr. Miller advised against starting vancomycin.     PRESSORS: [  ] YES [ X ] NO  WHICH:    Antimicrobial:  meropenem  IVPB 500 milliGRAM(s) IV Intermittent every 12 hours  meropenem  IVPB      nystatin    Suspension [id number] Unit(s) Swish and Swallow four times a day    Cardiovascular:  DOBUTamine Infusion 1.5 MICROgram(s)/kG/Min IV Continuous <Continuous>  norepinephrine Infusion 0.77 MICROgram(s)/kG/Min IV Continuous <Continuous>    Pulmonary:    Hematalogic:    Other:  calamine/zinc oxide Lotion 1 Application(s) Topical two times a day PRN  chlorhexidine 2% Cloths 1 Application(s) Topical daily  fentaNYL   Patch  25 MICROgram(s)/Hr 1 Patch Transdermal every 72 hours  filgrastim-sndz (ZARXIO) Injectable 480 MICROGram(s) SubCutaneous daily  hydrocortisone sodium succinate Injectable 50 milliGRAM(s) IV Push every 6 hours  HYDROmorphone  Injectable 1 milliGRAM(s) IV Push every 4 hours PRN  lidocaine 2% Jelly 5 milliLiter(s) IntraUrethral every 12 hours PRN  melatonin 3 milliGRAM(s) Oral at bedtime    calamine/zinc oxide Lotion 1 Application(s) Topical two times a day PRN  chlorhexidine 2% Cloths 1 Application(s) Topical daily  DOBUTamine Infusion 1.5 MICROgram(s)/kG/Min IV Continuous <Continuous>  fentaNYL   Patch  25 MICROgram(s)/Hr 1 Patch Transdermal every 72 hours  filgrastim-sndz (ZARXIO) Injectable 480 MICROGram(s) SubCutaneous daily  hydrocortisone sodium succinate Injectable 50 milliGRAM(s) IV Push every 6 hours  HYDROmorphone  Injectable 1 milliGRAM(s) IV Push every 4 hours PRN  lidocaine 2% Jelly 5 milliLiter(s) IntraUrethral every 12 hours PRN  melatonin 3 milliGRAM(s) Oral at bedtime  meropenem  IVPB      meropenem  IVPB 500 milliGRAM(s) IV Intermittent every 12 hours  norepinephrine Infusion 0.77 MICROgram(s)/kG/Min IV Continuous <Continuous>  nystatin    Suspension [id number] Unit(s) Swish and Swallow four times a day    Drug Dosing Weight  Height (cm): 172.7 (30 Aug 2023 03:15)  Weight (kg): 95.2 (01 Sep 2023 14:00)  BMI (kg/m2): 31.9 (01 Sep 2023 14:00)  BSA (m2): 2.09 (01 Sep 2023 14:00)    CENTRAL LINE: [x ] YES [ ] NO  LOCATION: [facility]  DATE: 8/30  REMOVE: [ ] YES [ ] NO  EXPLAIN:    PUTNAM: [ x] YES [ ] NO    DATE INSERTED:  REMOVE:  [ ] YES [ ] NO  EXPLAIN:    A-LINE:  [x ] YES [ ] NO  LOCATION: 8/30  REMOVE:  [ ] YES [ ] NO  EXPLAIN:    PMH -reviewed admission note, no change since admission  PAST MEDICAL & SURGICAL HISTORY:  CA of prostate      Enlarged prostate with lower urinary tract symptoms (LUTS)      No significant past surgical history          ICU Vital Signs Last 24 Hrs  T(C): 35.9 (01 Sep 2023 23:24), Max: 36.8 (01 Sep 2023 19:00)  T(F): 96.6 (01 Sep 2023 23:24), Max: 98.2 (01 Sep 2023 19:00)  HR: 94 (02 Sep 2023 02:00) (94 - 113)  BP: --  BP(mean): --  ABP: 120/58 (02 Sep 2023 02:00) (93/66 - 142/78)  ABP(mean): 77 (02 Sep 2023 02:00) (57 - 99)  RR: 10 (02 Sep 2023 02:00) (10 - 25)  SpO2: 98% (02 Sep 2023 02:00) (98% - 100%)        ABG - ( 31 Aug 2023 15:43 )  pH, Arterial: 7.32  pH, Blood: x     /  pCO2: 28    /  pO2: 122   / HCO3: 14    / Base Excess: -10.2 /  SaO2: 100                   08-31 @ 07:01  -  09-01 @ 07:00  --------------------------------------------------------  IN: 1122.1 mL / OUT: 2500 mL / NET: -1377.9 mL      PHYSICAL EXAM:      PHYSICAL EXAM:  GENERAL: AAOx3 but delirium waxes and wanes   EYES: EOMI, PERRLA  NECK: Supple, No JVD; Normal thyroid; Trachea midline: No LAD   NERVOUS SYSTEM: Motor Strength 5/5 B/L upper and lower extremities; DTRs 2+ intact and symmetric; Tone normal; ROM full and free  CHEST/LUNG: No rales, rhonchi, wheezing, breath sounds present bilaterally  HEART: Regular rate and rhythm; No murmurs, no gallops  ABDOMEN: Soft, Nontender, distended; Bowel sounds present, no pain or masses on palpation  : Put[place] in place, voiding well  EXTREMITIES:  2+ Peripheral Pulses, No clubbing, cyanosis, or edema  SKIN: warm, intact, no lesions         LABS:  CBC Full  -  ( 01 Sep 2023 04:57 )  WBC Count : 0.27 K/uL  RBC Count : 2.25 M/uL  Hemoglobin : 7.1 g/dL  Hematocrit : 21.1 %  Platelet Count - Automated : 9 K/uL  Mean Cell Volume : 93.8 fl  Mean Cell Hemoglobin : 31.6 pg  Mean Cell Hemoglobin Concentration : 33.6 gm/dL  Auto Neutrophil # : 0.11 K/uL  Auto Lymphocyte # : 0.11 K/uL  Auto Monocyte # : 0.05 K/uL  Auto Eosinophil # : 0.00 K/uL  Auto Basophil # : 0.00 K/uL  Auto Neutrophil % : 40.0 %  Auto Lymphocyte % : 40.0 %  Auto Monocyte % : 20.0 %  Auto Eosinophil % : 0.0 %  Auto Basophil % : 0.0 %    09-01    134<L>  |  104  |  72<H>  ----------------------------<  134<H>  4.4   |  15<L>  |  3.96<H>    Ca    6.5<LL>      01 Sep 2023 04:57  Phos  5.1     09-01  Mg     2.3     09-01    TPro  6.0  /  Alb  1.7<L>  /  TBili  0.8  /  DBili  x   /  AST  19  /  ALT  18  /  AlkPhos  104  08-31      Urinalysis Basic - ( 01 Sep 2023 04:57 )    Color: x / Appearance: x / SG: x / pH: x  Gluc: 134 mg/dL / Ketone: x  / Bili: x / Urobili: x   Blood: x / Protein: x / Nitrite: x   Leuk Esterase: x / RBC: x / WBC x   Sq Epi: x / Non Sq Epi: x / Bacteria: x      Culture Results:   10,000 - 49,000 CFU/mL Escherichia coli (08-30 @ 12:55)      RADIOLOGY & ADDITIONAL STUDIES REVIEWED:  ***    [ ]GOALS OF CARE DISCUSSION WITH PATIENT/FAMILY/PROXY:    CRITICAL CARE TIME SPENT: 35 minutes INTERVAL HPI/OVERNIGHT EVENTS:  Seen and examined at bedside. Patient is AAOx3 but slightly delirious. Patient mentions feeling better today, no diarrhea. Patient's last bowel movement was yesterday morning. Waiting for stool studies. Dr. Miller advised against starting vancomycin and replaced meropenam with rocephin 1g q24.     PRESSORS: [  ] YES [ X ] NO  WHICH:    Antimicrobial:  meropenem  IVPB 500 milliGRAM(s) IV Intermittent every 12 hours  meropenem  IVPB      nystatin    Suspension [id number] Unit(s) Swish and Swallow four times a day    Cardiovascular:  DOBUTamine Infusion 1.5 MICROgram(s)/kG/Min IV Continuous <Continuous>  norepinephrine Infusion 0.77 MICROgram(s)/kG/Min IV Continuous <Continuous>    Pulmonary:    Hematalogic:    Other:  calamine/zinc oxide Lotion 1 Application(s) Topical two times a day PRN  chlorhexidine 2% Cloths 1 Application(s) Topical daily  fentaNYL   Patch  25 MICROgram(s)/Hr 1 Patch Transdermal every 72 hours  filgrastim-sndz (ZARXIO) Injectable 480 MICROGram(s) SubCutaneous daily  hydrocortisone sodium succinate Injectable 50 milliGRAM(s) IV Push every 6 hours  HYDROmorphone  Injectable 1 milliGRAM(s) IV Push every 4 hours PRN  lidocaine 2% Jelly 5 milliLiter(s) IntraUrethral every 12 hours PRN  melatonin 3 milliGRAM(s) Oral at bedtime    calamine/zinc oxide Lotion 1 Application(s) Topical two times a day PRN  chlorhexidine 2% Cloths 1 Application(s) Topical daily  DOBUTamine Infusion 1.5 MICROgram(s)/kG/Min IV Continuous <Continuous>  fentaNYL   Patch  25 MICROgram(s)/Hr 1 Patch Transdermal every 72 hours  filgrastim-sndz (ZARXIO) Injectable 480 MICROGram(s) SubCutaneous daily  hydrocortisone sodium succinate Injectable 50 milliGRAM(s) IV Push every 6 hours  HYDROmorphone  Injectable 1 milliGRAM(s) IV Push every 4 hours PRN  lidocaine 2% Jelly 5 milliLiter(s) IntraUrethral every 12 hours PRN  melatonin 3 milliGRAM(s) Oral at bedtime  meropenem  IVPB      meropenem  IVPB 500 milliGRAM(s) IV Intermittent every 12 hours  norepinephrine Infusion 0.77 MICROgram(s)/kG/Min IV Continuous <Continuous>  nystatin    Suspension [id number] Unit(s) Swish and Swallow four times a day    Drug Dosing Weight  Height (cm): 172.7 (30 Aug 2023 03:15)  Weight (kg): 95.2 (01 Sep 2023 14:00)  BMI (kg/m2): 31.9 (01 Sep 2023 14:00)  BSA (m2): 2.09 (01 Sep 2023 14:00)    CENTRAL LINE: [x ] YES [ ] NO  LOCATION: [facility]  DATE: 8/30  REMOVE: [ ] YES [ ] NO  EXPLAIN:    PUTNAM: [ x] YES [ ] NO    DATE INSERTED:  REMOVE:  [ ] YES [ ] NO  EXPLAIN:    A-LINE:  [x ] YES [ ] NO  LOCATION: 8/30  REMOVE:  [ ] YES [ ] NO  EXPLAIN:    PMH -reviewed admission note, no change since admission  PAST MEDICAL & SURGICAL HISTORY:  CA of prostate      Enlarged prostate with lower urinary tract symptoms (LUTS)      No significant past surgical history          ICU Vital Signs Last 24 Hrs  T(C): 35.9 (01 Sep 2023 23:24), Max: 36.8 (01 Sep 2023 19:00)  T(F): 96.6 (01 Sep 2023 23:24), Max: 98.2 (01 Sep 2023 19:00)  HR: 94 (02 Sep 2023 02:00) (94 - 113)  BP: --  BP(mean): --  ABP: 120/58 (02 Sep 2023 02:00) (93/66 - 142/78)  ABP(mean): 77 (02 Sep 2023 02:00) (57 - 99)  RR: 10 (02 Sep 2023 02:00) (10 - 25)  SpO2: 98% (02 Sep 2023 02:00) (98% - 100%)        ABG - ( 31 Aug 2023 15:43 )  pH, Arterial: 7.32  pH, Blood: x     /  pCO2: 28    /  pO2: 122   / HCO3: 14    / Base Excess: -10.2 /  SaO2: 100                   08-31 @ 07:01  -  09-01 @ 07:00  --------------------------------------------------------  IN: 1122.1 mL / OUT: 2500 mL / NET: -1377.9 mL      PHYSICAL EXAM:      PHYSICAL EXAM:  GENERAL: AAOx3 but delirium waxes and wanes   EYES: EOMI, PERRLA  NECK: Supple, No JVD; Normal thyroid; Trachea midline: No LAD   NERVOUS SYSTEM: Motor Strength 5/5 B/L upper and lower extremities; DTRs 2+ intact and symmetric; Tone normal; ROM full and free  CHEST/LUNG: No rales, rhonchi, wheezing, breath sounds present bilaterally  HEART: Regular rate and rhythm; No murmurs, no gallops  ABDOMEN: Soft, Nontender, distended; Bowel sounds present, no pain or masses on palpation  : Put[place] in place, voiding well  EXTREMITIES:  2+ Peripheral Pulses, No clubbing, cyanosis, or edema  SKIN: warm, intact, no lesions         LABS:  CBC Full  -  ( 01 Sep 2023 04:57 )  WBC Count : 0.27 K/uL  RBC Count : 2.25 M/uL  Hemoglobin : 7.1 g/dL  Hematocrit : 21.1 %  Platelet Count - Automated : 9 K/uL  Mean Cell Volume : 93.8 fl  Mean Cell Hemoglobin : 31.6 pg  Mean Cell Hemoglobin Concentration : 33.6 gm/dL  Auto Neutrophil # : 0.11 K/uL  Auto Lymphocyte # : 0.11 K/uL  Auto Monocyte # : 0.05 K/uL  Auto Eosinophil # : 0.00 K/uL  Auto Basophil # : 0.00 K/uL  Auto Neutrophil % : 40.0 %  Auto Lymphocyte % : 40.0 %  Auto Monocyte % : 20.0 %  Auto Eosinophil % : 0.0 %  Auto Basophil % : 0.0 %    09-01    134<L>  |  104  |  72<H>  ----------------------------<  134<H>  4.4   |  15<L>  |  3.96<H>    Ca    6.5<LL>      01 Sep 2023 04:57  Phos  5.1     09-01  Mg     2.3     09-01    TPro  6.0  /  Alb  1.7<L>  /  TBili  0.8  /  DBili  x   /  AST  19  /  ALT  18  /  AlkPhos  104  08-31      Urinalysis Basic - ( 01 Sep 2023 04:57 )    Color: x / Appearance: x / SG: x / pH: x  Gluc: 134 mg/dL / Ketone: x  / Bili: x / Urobili: x   Blood: x / Protein: x / Nitrite: x   Leuk Esterase: x / RBC: x / WBC x   Sq Epi: x / Non Sq Epi: x / Bacteria: x      Culture Results:   10,000 - 49,000 CFU/mL Escherichia coli (08-30 @ 12:55)      RADIOLOGY & ADDITIONAL STUDIES REVIEWED:  ***    [ ]GOALS OF CARE DISCUSSION WITH PATIENT/FAMILY/PROXY:    CRITICAL CARE TIME SPENT: 35 minutes

## 2023-09-03 LAB
ADAMTS13 ACTIVITY: 44.2 % — LOW
ADAMTS13-COMMENT: SIGNIFICANT CHANGE UP
ALBUMIN SERPL ELPH-MCNC: 1.8 G/DL — LOW (ref 3.5–5)
ALP SERPL-CCNC: 139 U/L — HIGH (ref 40–120)
ALT FLD-CCNC: 14 U/L DA — SIGNIFICANT CHANGE UP (ref 10–60)
ANION GAP SERPL CALC-SCNC: 12 MMOL/L — SIGNIFICANT CHANGE UP (ref 5–17)
AST SERPL-CCNC: 10 U/L — SIGNIFICANT CHANGE UP (ref 10–40)
BILIRUB SERPL-MCNC: 0.6 MG/DL — SIGNIFICANT CHANGE UP (ref 0.2–1.2)
BUN SERPL-MCNC: 109 MG/DL — HIGH (ref 7–18)
CALCIUM SERPL-MCNC: 5.9 MG/DL — CRITICAL LOW (ref 8.4–10.5)
CHLORIDE SERPL-SCNC: 105 MMOL/L — SIGNIFICANT CHANGE UP (ref 96–108)
CO2 SERPL-SCNC: 17 MMOL/L — LOW (ref 22–31)
CREAT SERPL-MCNC: 4.24 MG/DL — HIGH (ref 0.5–1.3)
EGFR: 15 ML/MIN/1.73M2 — LOW
GLUCOSE SERPL-MCNC: 122 MG/DL — HIGH (ref 70–99)
HCT VFR BLD CALC: 24.9 % — LOW (ref 39–50)
HCT VFR BLD CALC: 25 % — LOW (ref 39–50)
HGB BLD-MCNC: 8.7 G/DL — LOW (ref 13–17)
HGB BLD-MCNC: 8.7 G/DL — LOW (ref 13–17)
MAGNESIUM SERPL-MCNC: 2.4 MG/DL — SIGNIFICANT CHANGE UP (ref 1.6–2.6)
MCHC RBC-ENTMCNC: 31.1 PG — SIGNIFICANT CHANGE UP (ref 27–34)
MCHC RBC-ENTMCNC: 31.2 PG — SIGNIFICANT CHANGE UP (ref 27–34)
MCHC RBC-ENTMCNC: 34.8 GM/DL — SIGNIFICANT CHANGE UP (ref 32–36)
MCHC RBC-ENTMCNC: 34.9 GM/DL — SIGNIFICANT CHANGE UP (ref 32–36)
MCV RBC AUTO: 89.2 FL — SIGNIFICANT CHANGE UP (ref 80–100)
MCV RBC AUTO: 89.3 FL — SIGNIFICANT CHANGE UP (ref 80–100)
NRBC # BLD: 0 /100 WBCS — SIGNIFICANT CHANGE UP (ref 0–0)
NRBC # BLD: 0 /100 WBCS — SIGNIFICANT CHANGE UP (ref 0–0)
PHOSPHATE SERPL-MCNC: 5.5 MG/DL — HIGH (ref 2.5–4.5)
PLATELET # BLD AUTO: 21 K/UL — LOW (ref 150–400)
PLATELET # BLD AUTO: 8 K/UL — CRITICAL LOW (ref 150–400)
POTASSIUM SERPL-MCNC: 3.9 MMOL/L — SIGNIFICANT CHANGE UP (ref 3.5–5.3)
POTASSIUM SERPL-SCNC: 3.9 MMOL/L — SIGNIFICANT CHANGE UP (ref 3.5–5.3)
PROT SERPL-MCNC: 5.6 G/DL — LOW (ref 6–8.3)
RBC # BLD: 2.79 M/UL — LOW (ref 4.2–5.8)
RBC # BLD: 2.8 M/UL — LOW (ref 4.2–5.8)
RBC # FLD: 19 % — HIGH (ref 10.3–14.5)
RBC # FLD: 19.3 % — HIGH (ref 10.3–14.5)
SODIUM SERPL-SCNC: 134 MMOL/L — LOW (ref 135–145)
WBC # BLD: 2.77 K/UL — LOW (ref 3.8–10.5)
WBC # BLD: 3.51 K/UL — LOW (ref 3.8–10.5)
WBC # FLD AUTO: 2.77 K/UL — LOW (ref 3.8–10.5)
WBC # FLD AUTO: 3.51 K/UL — LOW (ref 3.8–10.5)

## 2023-09-03 RX ORDER — LACTULOSE 10 G/15ML
15 SOLUTION ORAL
Refills: 0 | Status: COMPLETED | OUTPATIENT
Start: 2023-09-03 | End: 2023-09-04

## 2023-09-03 RX ADMIN — LACTULOSE 15 GRAM(S): 10 SOLUTION ORAL at 17:40

## 2023-09-03 RX ADMIN — CEFTRIAXONE 100 MILLIGRAM(S): 500 INJECTION, POWDER, FOR SOLUTION INTRAMUSCULAR; INTRAVENOUS at 17:41

## 2023-09-03 RX ADMIN — Medication 50 MILLIGRAM(S): at 00:05

## 2023-09-03 RX ADMIN — Medication 50 MILLIGRAM(S): at 13:38

## 2023-09-03 RX ADMIN — Medication 480 MICROGRAM(S): at 15:28

## 2023-09-03 RX ADMIN — Medication 50 MILLIGRAM(S): at 06:13

## 2023-09-03 RX ADMIN — Medication 500000 UNIT(S): at 06:13

## 2023-09-03 RX ADMIN — LACTULOSE 15 GRAM(S): 10 SOLUTION ORAL at 15:28

## 2023-09-03 RX ADMIN — Medication 1000 MILLIGRAM(S): at 06:42

## 2023-09-03 RX ADMIN — Medication 500000 UNIT(S): at 13:36

## 2023-09-03 RX ADMIN — SENNA PLUS 2 TABLET(S): 8.6 TABLET ORAL at 22:07

## 2023-09-03 RX ADMIN — Medication 1000 MILLIGRAM(S): at 14:12

## 2023-09-03 RX ADMIN — Medication 1000 MILLIGRAM(S): at 06:12

## 2023-09-03 RX ADMIN — Medication 500000 UNIT(S): at 17:40

## 2023-09-03 RX ADMIN — Medication 500000 UNIT(S): at 00:06

## 2023-09-03 RX ADMIN — POLYETHYLENE GLYCOL 3350 17 GRAM(S): 17 POWDER, FOR SOLUTION ORAL at 13:35

## 2023-09-03 RX ADMIN — Medication 1000 MILLIGRAM(S): at 13:42

## 2023-09-03 RX ADMIN — Medication 3 MILLIGRAM(S): at 22:04

## 2023-09-03 RX ADMIN — Medication 50 MILLIGRAM(S): at 17:40

## 2023-09-03 NOTE — DISCHARGE NOTE PROVIDER - CARE PROVIDER_API CALL
Nasir, Gondal  [address] [facility]  Phone: [phone]  Fax: (   )    -  Follow Up Time: 1 week    Carlitos Farah  Cardiac Electrophysiology  [address] [place] [address], [address] [address]  Phone: [phone]  Fax: [phone]  Follow Up Time: 1 week    Shukri Fair  Internal Medicine  [address] [place] [address]  Phone: [phone]  Fax: [phone]  Follow Up Time: 1 week    Diogenes Goldsmith  Urology  [address] [place] [address]  Phone: [phone]  Fax: [phone]  Follow Up Time: 1 week

## 2023-09-03 NOTE — PROGRESS NOTE ADULT - SUBJECTIVE AND OBJECTIVE BOX
pt seen and examined  9/3/23      Antimicrobial:  meropenem  IVPB 500 milliGRAM(s) IV Intermittent every 12 hours  meropenem  IVPB      nystatin    Suspension [id number] Unit(s) Swish and Swallow four times a day    Cardiovascular:  DOBUTamine Infusion 1.5 MICROgram(s)/kG/Min IV Continuous <Continuous>  norepinephrine Infusion 0.77 MICROgram(s)/kG/Min IV Continuous <Continuous>    Pulmonary:    Hematalogic:    Other:  calamine/zinc oxide Lotion 1 Application(s) Topical two times a day PRN  chlorhexidine 2% Cloths 1 Application(s) Topical daily  fentaNYL   Patch  25 MICROgram(s)/Hr 1 Patch Transdermal every 72 hours  filgrastim-sndz (ZARXIO) Injectable 480 MICROGram(s) SubCutaneous daily  hydrocortisone sodium succinate Injectable 50 milliGRAM(s) IV Push every 6 hours  HYDROmorphone  Injectable 1 milliGRAM(s) IV Push every 4 hours PRN  lidocaine 2% Jelly 5 milliLiter(s) IntraUrethral every 12 hours PRN  melatonin 3 milliGRAM(s) Oral at bedtime    calamine/zinc oxide Lotion 1 Application(s) Topical two times a day PRN  chlorhexidine 2% Cloths 1 Application(s) Topical daily  DOBUTamine Infusion 1.5 MICROgram(s)/kG/Min IV Continuous <Continuous>  fentaNYL   Patch  25 MICROgram(s)/Hr 1 Patch Transdermal every 72 hours  filgrastim-sndz (ZARXIO) Injectable 480 MICROGram(s) SubCutaneous daily  hydrocortisone sodium succinate Injectable 50 milliGRAM(s) IV Push every 6 hours  HYDROmorphone  Injectable 1 milliGRAM(s) IV Push every 4 hours PRN  lidocaine 2% Jelly 5 milliLiter(s) IntraUrethral every 12 hours PRN  melatonin 3 milliGRAM(s) Oral at bedtime  meropenem  IVPB      meropenem  IVPB 500 milliGRAM(s) IV Intermittent every 12 hours  norepinephrine Infusion 0.77 MICROgram(s)/kG/Min IV Continuous <Continuous>  nystatin    Suspension [id number] Unit(s) Swish and Swallow four times a day    Drug Dosing Weight  Height (cm): 172.7 (30 Aug 2023 03:15)  Weight (kg): 95.2 (01 Sep 2023 14:00)  BMI (kg/m2): 31.9 (01 Sep 2023 14:00)  BSA (m2): 2.09 (01 Sep 2023 14:00)    CENTRAL LINE: [x ] YES [ ] NO  LOCATION: [facility]  DATE: 8/30  REMOVE: [ ] YES [ ] NO  EXPLAIN:    PUTNAM: [ x] YES [ ] NO    DATE INSERTED:  REMOVE:  [ ] YES [ ] NO  EXPLAIN:    A-LINE:  [x ] YES [ ] NO  LOCATION: 8/30  REMOVE:  [ ] YES [ ] NO  EXPLAIN:    PMH -reviewed admission note, no change since admission  PAST MEDICAL & SURGICAL HISTORY:  CA of prostate      Enlarged prostate with lower urinary tract symptoms (LUTS)      No significant past surgical history          ICU Vital Signs Last 24 Hrs  T(C): 35.9 (01 Sep 2023 23:24), Max: 36.8 (01 Sep 2023 19:00)  T(F): 96.6 (01 Sep 2023 23:24), Max: 98.2 (01 Sep 2023 19:00)  HR: 94 (02 Sep 2023 02:00) (94 - 113)  BP: --  BP(mean): --  ABP: 120/58 (02 Sep 2023 02:00) (93/66 - 142/78)  ABP(mean): 77 (02 Sep 2023 02:00) (57 - 99)  RR: 10 (02 Sep 2023 02:00) (10 - 25)  SpO2: 98% (02 Sep 2023 02:00) (98% - 100%)        ABG - ( 31 Aug 2023 15:43 )  pH, Arterial: 7.32  pH, Blood: x     /  pCO2: 28    /  pO2: 122   / HCO3: 14    / Base Excess: -10.2 /  SaO2: 100                   08-31 @ 07:01  -  09-01 @ 07:00  --------------------------------------------------------  IN: 1122.1 mL / OUT: 2500 mL / NET: -1377.9 mL      PHYSICAL EXAM:      PHYSICAL EXAM:  GENERAL: AAOx3 but delirium waxes and wanes   EYES: EOMI, PERRLA  NECK: Supple, No JVD; Normal thyroid; Trachea midline: No LAD   NERVOUS SYSTEM: Motor Strength 5/5 B/L upper and lower extremities; DTRs 2+ intact and symmetric; Tone normal; ROM full and free  CHEST/LUNG: dec breath sounds at bases  HEART: Regular rate and rhythm; No murmurs, no gallops  ABDOMEN: Soft, Nontender, distended; Bowel sounds present, no pain or masses on palpation  : Putnam in place, voiding well  EXTREMITIES:  2+ Peripheral Pulses, No clubbing, cyanosis, or edema  SKIN: warm, intact, no lesions         LABS:  CBC Full  -  ( 01 Sep 2023 04:57 )  WBC Count : 0.27 K/uL  RBC Count : 2.25 M/uL  Hemoglobin : 7.1 g/dL  Hematocrit : 21.1 %  Platelet Count - Automated : 9 K/uL  Mean Cell Volume : 93.8 fl  Mean Cell Hemoglobin : 31.6 pg  Mean Cell Hemoglobin Concentration : 33.6 gm/dL  Auto Neutrophil # : 0.11 K/uL  Auto Lymphocyte # : 0.11 K/uL  Auto Monocyte # : 0.05 K/uL  Auto Eosinophil # : 0.00 K/uL  Auto Basophil # : 0.00 K/uL  Auto Neutrophil % : 40.0 %  Auto Lymphocyte % : 40.0 %  Auto Monocyte % : 20.0 %  Auto Eosinophil % : 0.0 %  Auto Basophil % : 0.0 %    09-01    134<L>  |  104  |  72<H>  ----------------------------<  134<H>  4.4   |  15<L>  |  3.96<H>    Ca    6.5<LL>      01 Sep 2023 04:57  Phos  5.1     09-01  Mg     2.3     09-01    TPro  6.0  /  Alb  1.7<L>  /  TBili  0.8  /  DBili  x   /  AST  19  /  ALT  18  /  AlkPhos  104  08-31      Urinalysis Basic - ( 01 Sep 2023 04:57 )    Color: x / Appearance: x / SG: x / pH: x  Gluc: 134 mg/dL / Ketone: x  / Bili: x / Urobili: x   Blood: x / Protein: x / Nitrite: x   Leuk Esterase: x / RBC: x / WBC x   Sq Epi: x / Non Sq Epi: x / Bacteria: x      Culture Results:   10,000 - 49,000 CFU/mL Escherichia coli (08-30 @ 12:55)      RADIOLOGY & ADDITIONAL STUDIES REVIEWED:  ***    [ ]GOALS OF CARE DISCUSSION WITH PATIENT/FAMILY/PROXY:    CRITICAL CARE TIME SPENT: 35 minutes

## 2023-09-03 NOTE — DISCHARGE NOTE PROVIDER - DETAILS OF MALNUTRITION DIAGNOSIS/DIAGNOSES
This patient has been assessed with a concern for Malnutrition and was treated during this hospitalization for the following Nutrition diagnosis/diagnoses:     -  09/06/2023: Severe protein-calorie malnutrition

## 2023-09-03 NOTE — DISCHARGE NOTE PROVIDER - NSDCCPCAREPLAN_GEN_ALL_CORE_FT
PRINCIPAL DISCHARGE DIAGNOSIS  Diagnosis: Prostate cancer metastatic to multiple sites  Assessment and Plan of Treatment: You have a history of prostate cancer. You were followed by heme onc. At this time, your chemotherapy is on hold.  Follow further recommendations by heme onc.      SECONDARY DISCHARGE DIAGNOSES  Diagnosis: Cardiogenic shock  Assessment and Plan of Treatment: You had elevated troponin levels, now its downtending. [facility] Dr. Farah was following you. TTE showed you had EF 15-20% Severe global left ventricular systolic dysfunction, Grade I diastolic dysfunction.  Denosumab can cause CHF. All chemotherapy drugs are on hold for now.       Diagnosis: JOSE ANGEL (acute kidney injury)  Assessment and Plan of Treatment: You creatinine was elevated due to mild left hydroureteronephrosis extending to the collapsed bladder  Avoid taking (NSAIDs) - (ex: Ibuprofen, Advil, Celebrex, Naprosyn)  Avoid taking any nephrotoxic agents (can harm kidneys) - Intravenous contrast for diagnostic testing, combination cold medications.  Have all medications adjusted for your renal function by your Health Care Provider.  Blood pressure control is important.  Take all medication as prescribed.    Diagnosis: Pancytopenia due to chemotherapy  Assessment and Plan of Treatment: Pancytopenia is a medical condition characterized by a decrease in all three major types of blood cells. Red blood cells, white blood cells, and platelets. Pancytopenia often leads to symptoms leading to fatigue and easy bruisin. You were given multiple transfusions throughout your hospital stay. Please follow up with your hemeonc within one week

## 2023-09-03 NOTE — PROGRESS NOTE ADULT - SUBJECTIVE AND OBJECTIVE BOX
pt resting in bed       acetaminophen     Tablet .. 1000 milliGRAM(s) Oral every 8 hours  calamine/zinc oxide Lotion 1 Application(s) Topical two times a day PRN  cefTRIAXone   IVPB 1000 milliGRAM(s) IV Intermittent every 24 hours  fentaNYL   Patch  25 MICROgram(s)/Hr 1 Patch Transdermal every 72 hours  filgrastim-sndz (ZARXIO) Injectable 480 MICROGram(s) SubCutaneous daily  hydrocortisone sodium succinate Injectable 50 milliGRAM(s) IV Push every 6 hours  HYDROmorphone  Injectable 1 milliGRAM(s) IV Push every 4 hours PRN  lidocaine 2% Jelly 5 milliLiter(s) IntraUrethral every 12 hours PRN  melatonin 3 milliGRAM(s) Oral at bedtime  nystatin    Suspension [id number] Unit(s) Swish and Swallow four times a day  polyethylene glycol 3350 17 Gram(s) Oral daily  senna 2 Tablet(s) Oral at bedtime                            8.7    2.77  )-----------( 8        ( 03 Sep 2023 05:20 )             24.9       Hemoglobin: 8.7 g/dL (09-03 @ 05:20)  Hemoglobin: 8.5 g/dL (09-02 @ 03:59)  Hemoglobin: 7.1 g/dL (09-01 @ 04:57)  Hemoglobin: 7.4 g/dL (08-31 @ 16:10)  Hemoglobin: 8.0 g/dL (08-31 @ 04:18)      09-02    133<L>  |  105  |  84<H>  ----------------------------<  107<H>  3.9   |  16<L>  |  3.89<H>    Ca    6.5<LL>      02 Sep 2023 03:59  Phos  5.4     09-02  Mg     2.3     09-02    TPro  6.1  /  Alb  1.8<L>  /  TBili  0.9  /  DBili  x   /  AST  16  /  ALT  19  /  AlkPhos  145<H>  09-02    Creatinine Trend: 3.89<--, 3.96<--, 4.00<--, 4.13<--, 4.29<--, 4.19<--    COAGS:     CARDIAC MARKERS ( 31 Aug 2023 14:02 )  x     / x     / 87 U/L / x     / 12.0 ng/mL        T(C): 36.4 (09-03-23 @ 05:15), Max: 36.4 (09-03-23 @ 05:15)  HR: 77 (09-03-23 @ 05:15) (77 - 97)  BP: 99/59 (09-03-23 @ 05:15) (99/59 - 108/72)  RR: 16 (09-03-23 @ 05:15) (10 - 21)  SpO2: 96% (09-03-23 @ 05:15) (96% - 99%)  Wt(kg): --    I&O's Summary    01 Sep 2023 07:01  -  02 Sep 2023 07:00  --------------------------------------------------------  IN: 1554 mL / OUT: 1700 mL / NET: -146 mL    02 Sep 2023 07:01  -  03 Sep 2023 06:12  --------------------------------------------------------  IN: 368 mL / OUT: 1010 mL / NET: -642 mL          Appearance: Normal appearing adult male in no acute distress  HEENT:   Normal oral mucosa, PERRL, EOMI	  Lymphatic: No lymphadenopathy , no edema  Cardiovascular: Normal S1 S2, No JVD, No murmurs , Peripheral pulses palpable 2+ bilaterally. Right IJ central line.  Respiratory: Lungs clear to auscultation, normal effort 	  Gastrointestinal:  Soft, mildly tender + BS	  Skin: No rashes, No ecchymoses, No cyanosis, cool to touch  Musculoskeletal: Normal range of motion, normal strength  Psychiatry:  Mood & affect appropriate      TELEMETRY: sinus tachycardia, short burst of NSVT	    ECG: sinus rhythm. 	    Echo:  DIMENSIONS:  Dimensions:     Normal Values:  LA:     3.9 cm    2.0 - 4.0 cm  Ao:     3.6 cm    2.0 - 3.8 cm  SEPTUM: 1.1 cm    0.6 - 1.2 cm  PWT:    0.8 cm    0.6 - 1.1 cm  LVIDd:  4.8 cm    3.0 - 5.6 cm  LVIDs:  4.4 cm    1.8 - 4.0 cm      Derived Variables:  LVMI: 76 g/m2  RWT: 0.33  Ejection Fraction Visual Estimate: 15-20 %  Ejection Fraction Reid: 20 %    ------------------------------------------------------------------------  OBSERVATIONS:  Mitral Valve: Normal mitral valve. Mild mitral  regurgitation.  Aortic Root: Aortic Root: 3.6 cm.    Aortic Valve: Normal trileaflet aortic valve.  Left Atrium: Normal left atrium.  LA volume index = 21  cc/m2.  Left Ventricle: Severe global left ventricular systolic  dysfunction. Ultrasound LV opacification agent was used to  enhance endocardial definition. Severe global hypokinesia.  Normal left ventricular internal dimensions and wall  thicknesses. Grade I diastolic dysfunction (Impaired  relaxation, mild).  Right Heart: Normal right atrium. Normal right ventricular  size and systolic function (TAPSE 1.9 cm). There is mild  tricuspid regurgitation. There is mild pulmonic  regurgitation.  Pericardium/PleuraNormal pericardium with no pericardial  effusion.  Hemodynamic: RV systolic pressure is normal at  33 mm Hg.      ASSESSMENT/PLAN: Mr Carrasco is a pleasant 63y Male here with neutropenic fever and mixed shock picture: septic + cardiogenic.  He has newly diagnosed congestive heart failure, with severe cardiomyopathy, EF 15-20%, LV nondilated.  He has acute on chronic CKD- his baseline Creat was 1, last year.  This admission, Creat 3.5-4.  Unknown interim values.  ABX per ID     Long term - Denosumab can cause CHF.  Erleada and Denosumab can also accelerate/destablize coronary artery disease.  Should adjust his Onc plan accordingly.  Palliative care weighing in on prognosis and symptom management.   Cardiology  Date of service 9/3  pt resting in bed, no new complaints, states he feels well       acetaminophen     Tablet .. 1000 milliGRAM(s) Oral every 8 hours  calamine/zinc oxide Lotion 1 Application(s) Topical two times a day PRN  cefTRIAXone   IVPB 1000 milliGRAM(s) IV Intermittent every 24 hours  fentaNYL   Patch  25 MICROgram(s)/Hr 1 Patch Transdermal every 72 hours  filgrastim-sndz (ZARXIO) Injectable 480 MICROGram(s) SubCutaneous daily  hydrocortisone sodium succinate Injectable 50 milliGRAM(s) IV Push every 6 hours  HYDROmorphone  Injectable 1 milliGRAM(s) IV Push every 4 hours PRN  lidocaine 2% Jelly 5 milliLiter(s) IntraUrethral every 12 hours PRN  melatonin 3 milliGRAM(s) Oral at bedtime  nystatin    Suspension [id number] Unit(s) Swish and Swallow four times a day  polyethylene glycol 3350 17 Gram(s) Oral daily  senna 2 Tablet(s) Oral at bedtime                            8.7    2.77  )-----------( 8        ( 03 Sep 2023 05:20 )             24.9       Hemoglobin: 8.7 g/dL (09-03 @ 05:20)  Hemoglobin: 8.5 g/dL (09-02 @ 03:59)  Hemoglobin: 7.1 g/dL (09-01 @ 04:57)  Hemoglobin: 7.4 g/dL (08-31 @ 16:10)  Hemoglobin: 8.0 g/dL (08-31 @ 04:18)      09-02    133<L>  |  105  |  84<H>  ----------------------------<  107<H>  3.9   |  16<L>  |  3.89<H>    Ca    6.5<LL>      02 Sep 2023 03:59  Phos  5.4     09-02  Mg     2.3     09-02    TPro  6.1  /  Alb  1.8<L>  /  TBili  0.9  /  DBili  x   /  AST  16  /  ALT  19  /  AlkPhos  145<H>  09-02    Creatinine Trend: 3.89<--, 3.96<--, 4.00<--, 4.13<--, 4.29<--, 4.19<--    COAGS:     CARDIAC MARKERS ( 31 Aug 2023 14:02 )  x     / x     / 87 U/L / x     / 12.0 ng/mL        T(C): 36.4 (09-03-23 @ 05:15), Max: 36.4 (09-03-23 @ 05:15)  HR: 77 (09-03-23 @ 05:15) (77 - 97)  BP: 99/59 (09-03-23 @ 05:15) (99/59 - 108/72)  RR: 16 (09-03-23 @ 05:15) (10 - 21)  SpO2: 96% (09-03-23 @ 05:15) (96% - 99%)  Wt(kg): --    I&O's Summary    01 Sep 2023 07:01  -  02 Sep 2023 07:00  --------------------------------------------------------  IN: 1554 mL / OUT: 1700 mL / NET: -146 mL    02 Sep 2023 07:01  -  03 Sep 2023 06:12  --------------------------------------------------------  IN: 368 mL / OUT: 1010 mL / NET: -642 mL          Appearance: Normal appearing adult male in no acute distress  HEENT:   Normal oral mucosa, PERRL, EOMI	  Lymphatic: No lymphadenopathy , no edema  Cardiovascular: Normal S1 S2, No JVD, No murmurs , Peripheral pulses palpable 2+ bilaterally. Right IJ central line.  Respiratory: Lungs clear to auscultation, normal effort 	  Gastrointestinal:  Soft, mildly tender + BS	  Skin: No rashes, No ecchymoses, No cyanosis, cool to touch  Musculoskeletal: Normal range of motion, normal strength  Psychiatry:  Mood & affect appropriate      TELEMETRY: sinus tachycardia, short burst of NSVT	    ECG: sinus rhythm. 	    Echo:  DIMENSIONS:  Dimensions:     Normal Values:  LA:     3.9 cm    2.0 - 4.0 cm  Ao:     3.6 cm    2.0 - 3.8 cm  SEPTUM: 1.1 cm    0.6 - 1.2 cm  PWT:    0.8 cm    0.6 - 1.1 cm  LVIDd:  4.8 cm    3.0 - 5.6 cm  LVIDs:  4.4 cm    1.8 - 4.0 cm      Derived Variables:  LVMI: 76 g/m2  RWT: 0.33  Ejection Fraction Visual Estimate: 15-20 %  Ejection Fraction Reid: 20 %    ------------------------------------------------------------------------  OBSERVATIONS:  Mitral Valve: Normal mitral valve. Mild mitral  regurgitation.  Aortic Root: Aortic Root: 3.6 cm.    Aortic Valve: Normal trileaflet aortic valve.  Left Atrium: Normal left atrium.  LA volume index = 21  cc/m2.  Left Ventricle: Severe global left ventricular systolic  dysfunction. Ultrasound LV opacification agent was used to  enhance endocardial definition. Severe global hypokinesia.  Normal left ventricular internal dimensions and wall  thicknesses. Grade I diastolic dysfunction (Impaired  relaxation, mild).  Right Heart: Normal right atrium. Normal right ventricular  size and systolic function (TAPSE 1.9 cm). There is mild  tricuspid regurgitation. There is mild pulmonic  regurgitation.  Pericardium/PleuraNormal pericardium with no pericardial  effusion.  Hemodynamic: RV systolic pressure is normal at  33 mm Hg.      ASSESSMENT/PLAN: Mr Carrasco is a pleasant 63y Male here with neutropenic fever and mixed shock picture: septic + cardiogenic.  He has newly diagnosed congestive heart failure, with severe cardiomyopathy, EF 15-20%, LV nondilated.  He has acute on chronic CKD- his baseline Creat was 1, last year.  This admission, Creat 3.5-4.  Unknown interim values.  ABX per ID   Managing residual vasodilatory state from sepsis with hydrocortisone injections. No longer requiring titratable vasopressors/inotropes.  Once off of all BP-support Rx, recommend to start low dose CHF beta blockers (Toprol XL 25mg daily).    Prior to discharge we may consider starting pre-load/after-load reducing drugs.    Long term - Denosumab can cause CHF.  Erleada and Denosumab can also accelerate/destablize coronary artery disease.  Should adjust his Onc plan accordingly.  Palliative care weighing in on prognosis and symptom management.

## 2023-09-03 NOTE — PROGRESS NOTE ADULT - ASSESSMENT
1. JOSE ANGEL due to ATN from septic shock   CT Scan shows left mild hydroureteronephrosis with collapsed bladder.  -Scr improving to 3.9mg/dl from 4.1mg/d. Cr flor again today with reduced urine.  -FeNa >1%. spot protein to creatinine ratio elevated; rpt after infection resolved.  -Adjust meds to eGFR and avoid IV Gadolinium contrast, NSAIDs, and phosphate enema.  -Monitor I/O's daily.   -Monitor SMA daily.    2. Hypotension due to septic shock  -on two pressor, weaning off.  -monitor BP.    3. Hyponatremia possible multifactorial due to hypovolemia and high ADH state with lack of free water excretion.  -Na improving to 134.  -urine lytes noted.   -Check Seurm Na daily  bid. Monitor I/O's daily. Avoid overcorrection of NA (8-10meq/day).    4. HAGMA due to Lactic Acidosis  - ABG noted  -CO2 is okay.  -monitor CO2 and ABG.    5. Hyperphosphatemia:  -phos is 5.1meq/L, should improve with renal recovery.  -corrected Ca is 8.3mg/dL   Discussed with hiss daughter in detail regarding the renal plan and care

## 2023-09-03 NOTE — PROGRESS NOTE ADULT - SUBJECTIVE AND OBJECTIVE BOX
DHAVAL BIRD  63y  Patient is a 63y old  Male who presents with a chief complaint of Septic Shock 2/2 neutropenic fever (03 Sep 2023 12:04)    HPI: Seen and examined. Followed for JOSE ANGEL.  Admitted for neutropenia with sepsis.    HEALTH ISSUES - PROBLEM Dx:  Prostate cancer metastatic to multiple sites    Severe protein-calorie malnutrition    Encounter for palliative care    Chest pain          MEDICATIONS  (STANDING):  cefTRIAXone   IVPB 1000 milliGRAM(s) IV Intermittent every 24 hours  fentaNYL   Patch  25 MICROgram(s)/Hr 1 Patch Transdermal every 72 hours  filgrastim-sndz (ZARXIO) Injectable 480 MICROGram(s) SubCutaneous daily  hydrocortisone sodium succinate Injectable 50 milliGRAM(s) IV Push every 6 hours  lactulose Syrup 15 Gram(s) Oral two times a day  melatonin 3 milliGRAM(s) Oral at bedtime  nystatin    Suspension [id number] Unit(s) Swish and Swallow four times a day  polyethylene glycol 3350 17 Gram(s) Oral daily  senna 2 Tablet(s) Oral at bedtime    MEDICATIONS  (PRN):  calamine/zinc oxide Lotion 1 Application(s) Topical two times a day PRN Rash and/or Itching  HYDROmorphone  Injectable 1 milliGRAM(s) IV Push every 4 hours PRN Pain  lidocaine 2% Jelly 5 milliLiter(s) IntraUrethral every 12 hours PRN Pain at koehler site    Vital Signs Last 24 Hrs  T(C): 36.6 (03 Sep 2023 13:20), Max: 36.6 (03 Sep 2023 13:20)  T(F): 97.9 (03 Sep 2023 13:20), Max: 97.9 (03 Sep 2023 13:20)  HR: 77 (03 Sep 2023 13:20) (77 - 89)  BP: 104/69 (03 Sep 2023 13:20) (99/59 - 108/72)  BP(mean): 80 (02 Sep 2023 18:13) (80 - 80)  RR: 18 (03 Sep 2023 13:20) (12 - 18)  SpO2: 96% (03 Sep 2023 13:20) (95% - 99%)    Parameters below as of 03 Sep 2023 13:20  Patient On (Oxygen Delivery Method): room air      Daily     Daily     PHYSICAL EXAM:  Constitutional:  He appears comfortable and not distressed. Not diaphoretic.    Neck:  The thyroid is normal. Trachea is midline.     Breasts: Normal examination.    Respiratory: The lungs are clear to auscultation. No dullness and expansion is normal.    Cardiovascular: S1 and S2 are normal. No murmurs, rubs or gallops are present.    Gastrointestinal: The abdomen is soft. No tenderness is present. No masses are present. Bowel sounds are normal.    Genitourinary: The bladder is not distended. No CVA tenderness is present.    Extremities: No edema is noted. No deformities are present.    Neurological: Cognition is normal. Tone, power and sensation are normal. Gait is steady.    Skin: No lesions are seen  or palpated.    Lymph Nodes: No lymphadenopathy is present.    Psychiatric: Mood is appropriate. No hallucinations or flight of ideas are noted.    I&O's Summary    02 Sep 2023 07:01  -  03 Sep 2023 07:00  --------------------------------------------------------  IN: 368 mL / OUT: 1010 mL / NET: -642 mL    03 Sep 2023 07:01  -  03 Sep 2023 16:23  --------------------------------------------------------  IN: 0 mL / OUT: 800 mL / NET: -800 mL                            8.7    3.51  )-----------( 21       ( 03 Sep 2023 10:16 )             25.0     09-03    134<L>  |  105  |  109<H>  ----------------------------<  122<H>  3.9   |  17<L>  |  4.24<H>    Ca    5.9<LL>      03 Sep 2023 05:20  Phos  5.5     09-03  Mg     2.4     09-03    TPro  5.6<L>  /  Alb  1.8<L>  /  TBili  0.6  /  DBili  x   /  AST  10  /  ALT  14  /  AlkPhos  139<H>  09-03

## 2023-09-03 NOTE — PROGRESS NOTE ADULT - ASSESSMENT
This is a 62 yo M with pmhx of prostate CA with mets to liver and bone, on Cabazitaxel q3 weeks, presents to the ED for generalized weakness and pain admitted to ICU for septic shock 2/2 neutropenic fever and urosepsis    =================== Neuro============================  Alert and oriented x 2-3 at not base line ( A and O x 3)  Likely 2/2 sepsis      ================= Cardiovascular==========================  #Sinus Tachycardia  Likely in setting of sepsis and dehydration    # Cardiogenic Shock   Troponin trending down 2094>1891  EKG- NSR  hypotension resolved d/c levophed, dobutamine and vasopressin  TTE-EF 15-20% Severe global left ventricular systolic dysfunction, Grade I diastolic dysfunction  F/U cardiology Dr. Farah consulted for elevated trops and further vasopressor treatment  ================- Pulm=================================  #Tachypnea  Likely 2/2 lactic acidosis  c/w bipap for PRN  ==================ID===================================  #Septic Shock  started Rocephin 1g qd day 1/7  s/p 3L LR and 2L NS (total 5L)  patient w/ chronic steroid use, will give hydrocortisone 200 x1, and 50 q6hrs  hypotension: levophed s/p dobutamine and vasopressin  bcx: no growth  ucx: e.coli 10k-50k  s/p meropenam day 3  dr. ventura advised no need for vancomycin  Infectious Dx Paul following    #Neutropenic fever- now afebrile  With ANC of 25  WBC trending up- 0.05->0.20  On Fligrastim inj sc OD  Heme/Onc QMA consulted (8/31)Dr. Casper   ================= Nephro================================  #JOSE ANGEL  Cr 3.55 on admission->4.13(8/31/23)  CT A/P Mild left hydroureteronephrosis extending to the collapsed bladder  Urine lytes- WNL  Urology consulted- Dr. Goldsmith    #Lactic Acidosis now resolved    =================GI====================================  #Diarrhea  had 3 episodes of diarrhea in ED, 6episodes (9/1) none today  Likely 2/2 chemotherapy use  f/u Stool cx, gi pcr, stool O&P    ================ Heme==================================  #Neutropenia  As above    #Schistocytes. HUS vs TTP  thrombocytopenia (no active bleeding)  JOSE ANGEL     #Thrombocytopenia  Plt trending down-19-->9  s/p 3platelets to date 1u Platelet transfusion (8/31) and 2u platelets (9/1)  consent obtained from wife  f/u ADAMTS 13    #anemia  s/p 2u prbc  =================Endocrine===============================  No issues  ================= MSK============================  #Back pain 2/2 bone mets  -consulted pain management  - dilaudid 0.4 qh6 prn  - fentanyl patch    ================= Skin/Catheters============================  Peripheral IV's  Central line- R IJV (8/30)  Arterial line R radial (8/30)  Parisi catheter  =================Prophylaxis =============================  SCD's  Will hold off chemical ppx due to thrombocytopenia

## 2023-09-03 NOTE — DISCHARGE NOTE PROVIDER - PROVIDER TOKENS
FREE:[LAST:[Juancho],FIRST:[Gondal],PHONE:[phone]],FAX:[(   )    -],ADDRESS:[[address] [place] [address]],FOLLOWUP:[1 week]],PROVIDER:[TOKEN:[[id number]],FOLLOWUP:[1 week]],PROVIDER:[TOKEN:[[id number]],FOLLOWUP:[1 week]],PROVIDER:[TOKEN:[[id number]],FOLLOWUP:[1 week]]

## 2023-09-03 NOTE — DISCHARGE NOTE PROVIDER - NSDCMRMEDTOKEN_GEN_ALL_CORE_FT
naproxen 500 mg oral tablet: 1 tab(s) orally 2 times a day, As Needed  Percocet 10 mg-325 mg oral tablet: 1 orally every 6 hours as needed for  severe pain  predniSONE 5 mg oral tablet: 1 orally 2 times a day  prochlorperazine 10 mg oral tablet: 1 orally every 6 hours as needed for  nausea  tamsulosin 0.4 mg oral capsule: 1 cap(s) orally once a day (at bedtime)   acetaminophen 500 mg oral tablet: 2 tab(s) orally every 8 hours As needed Moderate Pain (4 - 6)  bisacodyl 5 mg oral delayed release tablet: 1 tab(s) orally every 12 hours As needed Constipation  calamine topical lotion: 1 Apply topically to affected area 2 times a day As needed Rash and/or Itching  fentaNYL 25 mcg/hr transdermal film, extended release: 1 patch transdermal every 72 hours  lidocaine: 1 patch intradermal every 12 hours  oxyCODONE 10 mg oral tablet: 1 tab(s) orally every 4 hours As needed Severe Pain (7 - 10)  pantoprazole 40 mg oral delayed release tablet: 1 tab(s) orally once a day (before a meal)  polyethylene glycol 3350 oral powder for reconstitution: 17 gram(s) orally 2 times a day  prochlorperazine 10 mg oral tablet: 1 orally every 6 hours as needed for  nausea  senna leaf extract oral tablet: 2 tab(s) orally once a day (at bedtime)

## 2023-09-03 NOTE — DISCHARGE NOTE PROVIDER - HOSPITAL COURSE
This is a 63 yr old man with  prostate CA with mets to liver and bone, on Cabazitaxel q3 weeks, presents to the ED for generalized weakness and pain admitted to ICU for septic shock   2/2 neutropenic fever.      ****INCOMPLETE*****       64 yo M pmhx of prostate CA with mets to liver and bone, on Cabazitaxel q3 weeks, presents to the ED for generalized weakness and pain. Follows up with Oncologist Dr. Nasir Gondal. Admitted to ICU for septic shock 2/2  urosepsis vs cardiogenic shock, and febrile neutropenia. Blood cultures neggative, urine culture revealed e.coli 10,000-49,000 Initally started on meropenem and later switched to rocephin 1g qd to complete 7 day course ID Dr. Manrique followed. Hospital course complicated by substernal chest pain 2x trops were elevated, EKG NSR, repeat troponin shows downgrading trops. PERCY shows EF: 15-20% likely chemotherapy induced. Also noted with pancytopenia, patient has been afebrile, his WBC count has been slowly uptrending on filgastrim, received 2 units prbc and 7 units platelets. Also with worsening JOSE ANGEL, and poor PO intake, palliative consulted for GOC-D and family opts for hospice and comfort care only plan for LTC with hospice. Patient is now ready for discharge to LTC hospice.     Given the clinical course, decision was made to discharge pt with comfort measures  Discharge plan discussed with attending   This is only a brief summary for the whole hospital course, please follow up with EMR

## 2023-09-04 LAB
ALBUMIN SERPL ELPH-MCNC: 1.9 G/DL — LOW (ref 3.5–5)
ALP SERPL-CCNC: 151 U/L — HIGH (ref 40–120)
ALT FLD-CCNC: 13 U/L DA — SIGNIFICANT CHANGE UP (ref 10–60)
ANION GAP SERPL CALC-SCNC: 15 MMOL/L — SIGNIFICANT CHANGE UP (ref 5–17)
APPEARANCE UR: ABNORMAL
AST SERPL-CCNC: 10 U/L — SIGNIFICANT CHANGE UP (ref 10–40)
BACTERIA # UR AUTO: ABNORMAL /HPF
BILIRUB SERPL-MCNC: 0.6 MG/DL — SIGNIFICANT CHANGE UP (ref 0.2–1.2)
BILIRUB UR-MCNC: NEGATIVE — SIGNIFICANT CHANGE UP
BLD GP AB SCN SERPL QL: SIGNIFICANT CHANGE UP
BUN SERPL-MCNC: 119 MG/DL — HIGH (ref 7–18)
CALCIUM SERPL-MCNC: 6.3 MG/DL — CRITICAL LOW (ref 8.4–10.5)
CHLORIDE SERPL-SCNC: 104 MMOL/L — SIGNIFICANT CHANGE UP (ref 96–108)
CO2 SERPL-SCNC: 16 MMOL/L — LOW (ref 22–31)
COLOR SPEC: ABNORMAL
COMMENT - URINE: SIGNIFICANT CHANGE UP
CREAT SERPL-MCNC: 4.37 MG/DL — HIGH (ref 0.5–1.3)
CULTURE RESULTS: SIGNIFICANT CHANGE UP
CULTURE RESULTS: SIGNIFICANT CHANGE UP
DIFF PNL FLD: ABNORMAL
EGFR: 14 ML/MIN/1.73M2 — LOW
GLUCOSE SERPL-MCNC: 134 MG/DL — HIGH (ref 70–99)
GLUCOSE UR QL: NEGATIVE MG/DL — SIGNIFICANT CHANGE UP
HCT VFR BLD CALC: 24.5 % — LOW (ref 39–50)
HCT VFR BLD CALC: 26.8 % — LOW (ref 39–50)
HGB BLD-MCNC: 8.3 G/DL — LOW (ref 13–17)
HGB BLD-MCNC: 9.2 G/DL — LOW (ref 13–17)
KETONES UR-MCNC: NEGATIVE MG/DL — SIGNIFICANT CHANGE UP
LEUKOCYTE ESTERASE UR-ACNC: ABNORMAL
MAGNESIUM SERPL-MCNC: 2.5 MG/DL — SIGNIFICANT CHANGE UP (ref 1.6–2.6)
MCHC RBC-ENTMCNC: 30.9 PG — SIGNIFICANT CHANGE UP (ref 27–34)
MCHC RBC-ENTMCNC: 31.2 PG — SIGNIFICANT CHANGE UP (ref 27–34)
MCHC RBC-ENTMCNC: 33.9 GM/DL — SIGNIFICANT CHANGE UP (ref 32–36)
MCHC RBC-ENTMCNC: 34.3 GM/DL — SIGNIFICANT CHANGE UP (ref 32–36)
MCV RBC AUTO: 90.8 FL — SIGNIFICANT CHANGE UP (ref 80–100)
MCV RBC AUTO: 91.1 FL — SIGNIFICANT CHANGE UP (ref 80–100)
NITRITE UR-MCNC: NEGATIVE — SIGNIFICANT CHANGE UP
NRBC # BLD: 0 /100 WBCS — SIGNIFICANT CHANGE UP (ref 0–0)
NRBC # BLD: 0 /100 WBCS — SIGNIFICANT CHANGE UP (ref 0–0)
PH UR: 6 — SIGNIFICANT CHANGE UP (ref 5–8)
PHOSPHATE SERPL-MCNC: 5.6 MG/DL — HIGH (ref 2.5–4.5)
PLATELET # BLD AUTO: 45 K/UL — LOW (ref 150–400)
PLATELET # BLD AUTO: 7 K/UL — CRITICAL LOW (ref 150–400)
POTASSIUM SERPL-MCNC: 3.4 MMOL/L — LOW (ref 3.5–5.3)
POTASSIUM SERPL-SCNC: 3.4 MMOL/L — LOW (ref 3.5–5.3)
PROT SERPL-MCNC: 6.1 G/DL — SIGNIFICANT CHANGE UP (ref 6–8.3)
PROT UR-MCNC: 100 MG/DL
RBC # BLD: 2.69 M/UL — LOW (ref 4.2–5.8)
RBC # BLD: 2.95 M/UL — LOW (ref 4.2–5.8)
RBC # FLD: 19 % — HIGH (ref 10.3–14.5)
RBC # FLD: 19.1 % — HIGH (ref 10.3–14.5)
RBC CASTS # UR COMP ASSIST: ABNORMAL /HPF
SODIUM SERPL-SCNC: 135 MMOL/L — SIGNIFICANT CHANGE UP (ref 135–145)
SP GR SPEC: 1.01 — SIGNIFICANT CHANGE UP (ref 1–1.03)
SPECIMEN SOURCE: SIGNIFICANT CHANGE UP
SPECIMEN SOURCE: SIGNIFICANT CHANGE UP
UROBILINOGEN FLD QL: 0.2 MG/DL — SIGNIFICANT CHANGE UP (ref 0.2–1)
WBC # BLD: 11.22 K/UL — HIGH (ref 3.8–10.5)
WBC # BLD: 8.38 K/UL — SIGNIFICANT CHANGE UP (ref 3.8–10.5)
WBC # FLD AUTO: 11.22 K/UL — HIGH (ref 3.8–10.5)
WBC # FLD AUTO: 8.38 K/UL — SIGNIFICANT CHANGE UP (ref 3.8–10.5)
WBC UR QL: ABNORMAL /HPF (ref 0–5)

## 2023-09-04 RX ORDER — PHENAZOPYRIDINE HCL 100 MG
100 TABLET ORAL EVERY 8 HOURS
Refills: 0 | Status: DISCONTINUED | OUTPATIENT
Start: 2023-09-04 | End: 2023-09-14

## 2023-09-04 RX ORDER — POTASSIUM CHLORIDE 20 MEQ
20 PACKET (EA) ORAL
Refills: 0 | Status: COMPLETED | OUTPATIENT
Start: 2023-09-04 | End: 2023-09-05

## 2023-09-04 RX ORDER — LACTULOSE 10 G/15ML
15 SOLUTION ORAL
Refills: 0 | Status: COMPLETED | OUTPATIENT
Start: 2023-09-04 | End: 2023-09-05

## 2023-09-04 RX ADMIN — LACTULOSE 15 GRAM(S): 10 SOLUTION ORAL at 15:23

## 2023-09-04 RX ADMIN — Medication 50 MILLIGRAM(S): at 18:25

## 2023-09-04 RX ADMIN — Medication 500000 UNIT(S): at 18:25

## 2023-09-04 RX ADMIN — Medication 50 MILLIGRAM(S): at 05:48

## 2023-09-04 RX ADMIN — Medication 50 MILLIGRAM(S): at 11:33

## 2023-09-04 RX ADMIN — Medication 50 MILLIGRAM(S): at 23:48

## 2023-09-04 RX ADMIN — Medication 50 MILLIGRAM(S): at 00:55

## 2023-09-04 RX ADMIN — Medication 500000 UNIT(S): at 11:33

## 2023-09-04 RX ADMIN — POLYETHYLENE GLYCOL 3350 17 GRAM(S): 17 POWDER, FOR SOLUTION ORAL at 11:33

## 2023-09-04 RX ADMIN — Medication 480 MICROGRAM(S): at 12:12

## 2023-09-04 RX ADMIN — LACTULOSE 15 GRAM(S): 10 SOLUTION ORAL at 05:48

## 2023-09-04 RX ADMIN — Medication 20 MILLIEQUIVALENT(S): at 18:24

## 2023-09-04 RX ADMIN — Medication 500000 UNIT(S): at 05:49

## 2023-09-04 RX ADMIN — Medication 3 MILLIGRAM(S): at 21:18

## 2023-09-04 RX ADMIN — Medication 100 MILLIGRAM(S): at 13:52

## 2023-09-04 RX ADMIN — Medication 100 MILLIGRAM(S): at 21:18

## 2023-09-04 RX ADMIN — Medication 500000 UNIT(S): at 00:55

## 2023-09-04 RX ADMIN — CEFTRIAXONE 100 MILLIGRAM(S): 500 INJECTION, POWDER, FOR SOLUTION INTRAMUSCULAR; INTRAVENOUS at 18:24

## 2023-09-04 NOTE — PROGRESS NOTE ADULT - ASSESSMENT
This is a 64 yo M with pmhx of prostate CA with mets to liver and bone, on Cabazitaxel q3 weeks, presents to the ED for generalized weakness and pain admitted to ICU for septic shock 2/2 neutropenic fever and urosepsis, s/p ICU stay    # Cardiogenic Shock   Troponin trending down 2094>1891  EKG- NSR  hypotension resolved d/c levophed, dobutamine and vasopressin  TTE-EF 15-20% Severe global left ventricular systolic dysfunction, Grade I diastolic dysfunction  F/U cardiology Dr. Farah consulted for elevated trops and further vasopressor treatment  ================- Pulm=================================  #Tachypnea  Likely 2/2 lactic acidosis  c/w bipap for PRN  ==================ID===================================  #Septic Shock  started Rocephin 1g qd day 1/7  s/p 3L LR and 2L NS (total 5L)  patient w/ chronic steroid use, will give hydrocortisone 200 x1, and 50 q6hrs  hypotension: levophed s/p dobutamine and vasopressin  bcx: no growth  ucx: e.coli 10k-50k  s/p meropenam day 3  dr. ventura advised no need for vancomycin  Infectious Dx Paul following    #Neutropenic fever- now afebrile  With ANC of 25  WBC trending up- 0.05->0.20  On Fligrastim inj sc OD  Heme/Onc QMA consulted (8/31)Dr. Casper   ================= Nephro================================  #JOSE ANGEL  Cr 3.55 on admission->4.13(8/31/23)  CT A/P Mild left hydroureteronephrosis extending to the collapsed bladder  Urine lytes- WNL  Urology consulted- Dr. Mishail    #Lactic Acidosis now resolved    =================GI====================================  #Diarrhea  had 3 episodes of diarrhea in ED, 6episodes (9/1) none today  Likely 2/2 chemotherapy use  f/u Stool cx, gi pcr, stool O&P    ================ Heme==================================  #Neutropenia  As above    #Schistocytes. HUS vs TTP  thrombocytopenia (no active bleeding)  JOSE ANGEL     #Thrombocytopenia  Plt trending down-19-->9  s/p 3platelets to date 1u Platelet transfusion (8/31) and 2u platelets (9/1)  consent obtained from wife  f/u ADAMTS 13    #anemia  s/p 2u prbc  =================Endocrine===============================  No issues  ================= MSK============================  #Back pain 2/2 bone mets  -consulted pain management  - dilaudid 0.4 qh6 prn  - fentanyl patch    ================= Skin/Catheters============================  Peripheral IV's  Central line- R IJV (8/30)  Arterial line R radial (8/30)  Parisi catheter  =================Prophylaxis =============================  SCD's  Will hold off chemical ppx due to thrombocytopenia

## 2023-09-04 NOTE — PROGRESS NOTE ADULT - NS ATTEND AMEND GEN_ALL_CORE FT
awaiting decision on hospice status.  if his goals of care will allow for BP / CHF meds, can start low dose of ToprolXL (25mg/day) for CHF managmeent.  BP not high enough for vasodilatory medications like an ARB.  If he elects hospice / no CHF meds, would document this as part of his goals of care on discharge.
note edited above re: BP management.
note edited above.  finish up use of HCT injections.  then start ToprolXL.  don't start other CHF meds until we discuss / closer to discharge when stable.
This is a 63 yr old man with  prostate CA with mets to liver and bone, on Cabazitaxel q3 weeks, presents to the ED for generalized weakness and pain admitted to ICU for septic shock 2/2 neutropenic fever.           ASSESSMENT     -  Septic Shock  -  Neutropenic fever  -  Pancytopenia   -  JOSE ANGEL  -  Lactic acidosis  - Tachypnea  - Sinus tachycardia  - New onset afib       Plan     - O2 supp as needed   - Fentenyl IVP   - Continue Meropenem and vanco iv  - Cultures neg for now   - Diarrhea  resolved   - Diet   - Neupogen , WBC improving   - Heme / Onc on case  - Hemodynamic support   - Dobutamin gtt  - Cards eval noted   - EKG  - TSH   - Titrate vasopressors to maintain MAP>65  - S/P > 4.5 L IVF  - Steroids for refractory shock   - Lactate elevated ..monitor   - Transfuse PLT . PRBC

## 2023-09-04 NOTE — PROGRESS NOTE ADULT - ASSESSMENT
Continue supportive care, Antibiotics  Transfuse platelets to  > 20K- No bleeding at this time noted  F/U 1 hour post transfusion of platelets  Continue F/U trend  Unfortunately all most likely secondary ot Cabazitaxel

## 2023-09-04 NOTE — PROGRESS NOTE ADULT - SUBJECTIVE AND OBJECTIVE BOX
pt seen and examined  23    MEDICATIONS  (STANDING):  cefTRIAXone   IVPB 1000 milliGRAM(s) IV Intermittent every 24 hours  fentaNYL   Patch  25 MICROgram(s)/Hr 1 Patch Transdermal every 72 hours  filgrastim-sndz (ZARXIO) Injectable 480 MICROGram(s) SubCutaneous daily  hydrocortisone sodium succinate Injectable 50 milliGRAM(s) IV Push every 6 hours  lactulose Syrup 15 Gram(s) Oral two times a day  melatonin 3 milliGRAM(s) Oral at bedtime  phenazopyridine 100 milliGRAM(s) Oral every 8 hours  polyethylene glycol 3350 17 Gram(s) Oral daily  potassium chloride    Tablet ER 20 milliEquivalent(s) Oral two times a day  senna 2 Tablet(s) Oral at bedtime    MEDICATIONS  (PRN):  calamine/zinc oxide Lotion 1 Application(s) Topical two times a day PRN Rash and/or Itching  HYDROmorphone  Injectable 1 milliGRAM(s) IV Push every 4 hours PRN Pain  lidocaine 2% Jelly 5 milliLiter(s) IntraUrethral every 12 hours PRN Pain at koe[place] site    Vital Signs Last 24 Hrs  T(C): 36.7 (23 @ 20:34), Max: 36.7 (23 @ 20:34)  T(F): 98.1 (23 @ 20:34), Max: 98.1 (23 @ 20:34)  HR: 68 (23 @ 20:34) (68 - 78)  BP: 110/67 (23 @ 20:34) (98/61 - 110/67)  BP(mean): 73 (23 @ 05:20) (73 - 73)  RR: 17 (23 @ 20:34) (16 - 18)  SpO2: 97% (23 @ 20:34) (95% - 97%)        PHYSICAL EXAM:  GENERAL: AAOx3 but delirium waxes and wanes   EYES: EOMI, PERRLA  NECK: Supple, No JVD; Normal thyroid; Trachea midline: No LAD   NERVOUS SYSTEM: Motor Strength 5/5 B/L upper and lower extremities; DTRs 2+ intact and symmetric; Tone normal; ROM full and free  CHEST/LUNG: dec breath sounds at bases  HEART: Regular rate and rhythm; No murmurs, no gallops  ABDOMEN: Soft, Nontender, distended; Bowel sounds present, no pain or masses on palpation  : [place] in place, voiding well  EXTREMITIES:  2+ Peripheral Pulses, No clubbing, cyanosis, or edema  SKIN: warm, intact, no lesions     LABS:      135  |  104  |  119<H>  ----------------------------<  134<H>  3.4<L>   |  16<L>  |  4.37<H>    Ca    6.3<LL>      04 Sep 2023 07:44  Phos  5.6       Mg     2.5         TPro  6.1  /  Alb  1.9<L>  /  TBili  0.6  /  DBili      /  AST  10  /  ALT  13  /  AlkPhos  151<H>      Creatinine Trend: 4.37 <--, 4.24 <--, 3.89 <--, 3.96 <--, 4.00 <--, 4.13 <--, 4.29 <--, 4.19 <--, 4.09 <--, 3.55 <--                        8.3    11.22 )-----------( 45       ( 04 Sep 2023 15:30 )             24.5     Urine Studies:  Urinalysis Basic - ( 04 Sep 2023 08:30 )    Color: Orange / Appearance: Turbid / S.012 / pH:   Gluc:  / Ketone: Negative mg/dL  / Bili: Negative / Urobili: 0.2 mg/dL   Blood:  / Protein: 100 mg/dL / Nitrite: Negative   Leuk Esterase: Large / RBC: Too Numerous to count /HPF / WBC Too Numerous to count /HPF   Sq Epi:  / Non Sq Epi:  / Bacteria: Many /HPF      Sodium, Random Urine: 104 mmol/L ( @ 12:55)  Osmolality, Random Urine: 294 mos/kg ( @ 12:55)  Creatinine, Random Urine: 36 mg/dL ( @ 12:55)          LIVER FUNCTIONS - ( 04 Sep 2023 07:44 )  Alb: 1.9 g/dL / Pro: 6.1 g/dL / ALK PHOS: 151 U/L / ALT: 13 U/L DA / AST: 10 U/L / GGT: x

## 2023-09-04 NOTE — CHART NOTE - NSCHARTNOTEFT_GEN_A_CORE
Patient with a history of prostate cancer admitted for pancytopenia and urosepsis. Today platelet count dropped to 7. Plan to transfuse 2 units of platelets. Patient is asymptomatic at this time. Reports no pain, shortness of breath, or bleeding at this time. Patient's JOSE ANGEL is also worsening due to volume loss. EF 15-20%. GOC has been discussed with family they are leaning towards hospice, discussion to be revisited by palliative.

## 2023-09-04 NOTE — PROGRESS NOTE ADULT - SUBJECTIVE AND OBJECTIVE BOX
DHAVAL BIRD  63y  Patient is a 63y old  Male who presents with a chief complaint of Septic Shock 2/2 neutropenic fever (04 Sep 2023 08:21)    HPI:  Seen and examined. No new complaints.  Followed for JOSE ANGEL.    HEALTH ISSUES - PROBLEM Dx:  Prostate cancer metastatic to multiple sites    Severe protein-calorie malnutrition    Encounter for palliative care    Chest pain          MEDICATIONS  (STANDING):  cefTRIAXone   IVPB 1000 milliGRAM(s) IV Intermittent every 24 hours  fentaNYL   Patch  25 MICROgram(s)/Hr 1 Patch Transdermal every 72 hours  filgrastim-sndz (ZARXIO) Injectable 480 MICROGram(s) SubCutaneous daily  hydrocortisone sodium succinate Injectable 50 milliGRAM(s) IV Push every 6 hours  melatonin 3 milliGRAM(s) Oral at bedtime  nystatin    Suspension 297989 Unit(s) Swish and Swallow four times a day  phenazopyridine 100 milliGRAM(s) Oral every 8 hours  polyethylene glycol 3350 17 Gram(s) Oral daily  potassium chloride    Tablet ER 20 milliEquivalent(s) Oral two times a day  senna 2 Tablet(s) Oral at bedtime    MEDICATIONS  (PRN):  calamine/zinc oxide Lotion 1 Application(s) Topical two times a day PRN Rash and/or Itching  HYDROmorphone  Injectable 1 milliGRAM(s) IV Push every 4 hours PRN Pain  lidocaine 2% Jelly 5 milliLiter(s) IntraUrethral every 12 hours PRN Pain at [place]oe[place] site    Vital Signs Last 24 Hrs  T(C): 36.5 (04 Sep 2023 05:20), Max: 36.7 (03 Sep 2023 20:35)  T(F): 97.7 (04 Sep 2023 05:20), Max: 98.1 (03 Sep 2023 20:35)  HR: 78 (04 Sep 2023 05:20) (77 - 78)  BP: 98/61 (04 Sep 2023 05:20) (94/62 - 104/69)  BP(mean): 73 (04 Sep 2023 05:20) (73 - 73)  RR: 18 (04 Sep 2023 05:20) (16 - 18)  SpO2: 95% (04 Sep 2023 05:20) (95% - 96%)    Parameters below as of 04 Sep 2023 05:20  Patient On (Oxygen Delivery Method): room air      Daily     Daily     PHYSICAL EXAM:  Constitutional:  He appears comfortable and not distressed. Not diaphoretic.    Neck:  The thyroid is normal. Trachea is midline.     Breasts: Normal examination.    Respiratory: The lungs are clear to auscultation. No dullness and expansion is normal.    Cardiovascular: S1 and S2 are normal. No mummurs, rubs or gallops are present.    Gastrointestinal: The abdomen is soft. No tenderness is present. No masses are present. Bowel sounds are normal.    Genitourinary: The bladder is not distended. No CVA tenderness is present.    Extremities: No edema is noted. No deformities are present.    Neurological: Cognition is normal. Tone, power and sensation are normal. Gait is steady.    Skin: No lesions are seen  or palpated.    Lymph Nodes: No lymphadenopathy is present.    Psychiatric: Mood is appropriate. No hallucinations or flight of ideas are noted.                              9.2    8.38  )-----------( 7        ( 04 Sep 2023 07:44 )             26.8     -    135  |  104  |  119<H>  ----------------------------<  134<H>  3.4<L>   |  16<L>  |  4.37<H>    Ca    5.9<LL>      03 Sep 2023 05:20  Phos  5.6     -  Mg     2.5         TPro  6.1  /  Alb  1.9<L>  /  TBili  0.6  /  DBili  x   /  AST  10  /  ALT  13  /  AlkPhos  151<H>      Urinalysis Basic - ( 04 Sep 2023 08:30 )    Color: Orange / Appearance: Turbid / S.012 / pH: x  Gluc: x / Ketone: Negative mg/dL  / Bili: Negative / Urobili: 0.2 mg/dL   Blood: x / Protein: 100 mg/dL / Nitrite: Negative   Leuk Esterase: Large / RBC: Too Numerous to count /HPF / WBC Too Numerous to count /HPF   Sq Epi: x / Non Sq Epi: x / Bacteria: Many /HPF     DHAVAL BIRD  63y  Patient is a 63y old  Male who presents with a chief complaint of Septic Shock 2/2 neutropenic fever (04 Sep 2023 08:21)    HPI:  Seen and examined. No new complaints.  Followed for JOSE ANGEL.    HEALTH ISSUES - PROBLEM Dx:  Prostate cancer metastatic to multiple sites    Severe protein-calorie malnutrition    Encounter for palliative care    Chest pain          MEDICATIONS  (STANDING):  cefTRIAXone   IVPB 1000 milliGRAM(s) IV Intermittent every 24 hours  fentaNYL   Patch  25 MICROgram(s)/Hr 1 Patch Transdermal every 72 hours  filgrastim-sndz (ZARXIO) Injectable 480 MICROGram(s) SubCutaneous daily  hydrocortisone sodium succinate Injectable 50 milliGRAM(s) IV Push every 6 hours  melatonin 3 milliGRAM(s) Oral at bedtime  nystatin    Suspension 411709 Unit(s) Swish and Swallow four times a day  phenazopyridine 100 milliGRAM(s) Oral every 8 hours  polyethylene glycol 3350 17 Gram(s) Oral daily  potassium chloride    Tablet ER 20 milliEquivalent(s) Oral two times a day  senna 2 Tablet(s) Oral at bedtime    MEDICATIONS  (PRN):  calamine/zinc oxide Lotion 1 Application(s) Topical two times a day PRN Rash and/or Itching  HYDROmorphone  Injectable 1 milliGRAM(s) IV Push every 4 hours PRN Pain  lidocaine 2% Jelly 5 milliLiter(s) IntraUrethral every 12 hours PRN Pain at [place]oe[place] site    Vital Signs Last 24 Hrs  T(C): 36.5 (04 Sep 2023 05:20), Max: 36.7 (03 Sep 2023 20:35)  T(F): 97.7 (04 Sep 2023 05:20), Max: 98.1 (03 Sep 2023 20:35)  HR: 78 (04 Sep 2023 05:20) (77 - 78)  BP: 98/61 (04 Sep 2023 05:20) (94/62 - 104/69)  BP(mean): 73 (04 Sep 2023 05:20) (73 - 73)  RR: 18 (04 Sep 2023 05:20) (16 - 18)  SpO2: 95% (04 Sep 2023 05:20) (95% - 96%)    Parameters below as of 04 Sep 2023 05:20  Patient On (Oxygen Delivery Method): room air      Daily     Daily     PHYSICAL EXAM:  Constitutional:  He appears comfortable and not distressed. Not diaphoretic.    Neck:  The thyroid is normal. Trachea is midline.     Breasts: Normal examination.    Respiratory: The lungs are clear to auscultation. No dullness and expansion is normal.    Cardiovascular: S1 and S2 are normal. No mummurs, rubs or gallops are present.    Gastrointestinal: The abdomen is soft. No tenderness is present. No masses are present. Bowel sounds are normal.    Genitourinary: The bladder is not distended. No CVA tenderness is present.    Extremities: No edema is noted. No deformities are present.    Neurological: Cognition is normal. Tone, power and sensation are normal. Gait is steady.    Skin: No lesions are seen  or palpated.    Lymph Nodes: No lymphadenopathy is present.    Psychiatric: Mood is appropriate. No hallucinations or flight of ideas are noted.    I&O's Summary    03 Sep 2023 07:01  -  04 Sep 2023 07:00  --------------------------------------------------------  IN: 0 mL / OUT: 1800 mL / NET: -1800 mL                              9.2    8.38  )-----------( 7        ( 04 Sep 2023 07:44 )             26.8         135  |  104  |  119<H>  ----------------------------<  134<H>  3.4<L>   |  16<L>  |  4.37<H>    Ca    5.9<LL>      03 Sep 2023 05:20  Phos  5.6       Mg     2.5         TPro  6.1  /  Alb  1.9<L>  /  TBili  0.6  /  DBili  x   /  AST  10  /  ALT  13  /  AlkPhos  151<H>      Urinalysis Basic - ( 04 Sep 2023 08:30 )    Color: Orange / Appearance: Turbid / S.012 / pH: x  Gluc: x / Ketone: Negative mg/dL  / Bili: Negative / Urobili: 0.2 mg/dL   Blood: x / Protein: 100 mg/dL / Nitrite: Negative   Leuk Esterase: Large / RBC: Too Numerous to count /HPF / WBC Too Numerous to count /HPF   Sq Epi: x / Non Sq Epi: x / Bacteria: Many /HPF

## 2023-09-04 NOTE — PROGRESS NOTE ADULT - SUBJECTIVE AND OBJECTIVE BOX
Cardiology  Date of service 9/4      pt denies chest pain        calamine/zinc oxide Lotion 1 Application(s) Topical two times a day PRN  cefTRIAXone   IVPB 1000 milliGRAM(s) IV Intermittent every 24 hours  fentaNYL   Patch  25 MICROgram(s)/Hr 1 Patch Transdermal every 72 hours  filgrastim-sndz (ZARXIO) Injectable 480 MICROGram(s) SubCutaneous daily  hydrocortisone sodium succinate Injectable 50 milliGRAM(s) IV Push every 6 hours  HYDROmorphone  Injectable 1 milliGRAM(s) IV Push every 4 hours PRN  lidocaine 2% Jelly 5 milliLiter(s) IntraUrethral every 12 hours PRN  melatonin 3 milliGRAM(s) Oral at bedtime  nystatin    Suspension 624509 Unit(s) Swish and Swallow four times a day  polyethylene glycol 3350 17 Gram(s) Oral daily  senna 2 Tablet(s) Oral at bedtime                            8.7    3.51  )-----------( 21       ( 03 Sep 2023 10:16 )             25.0       Hemoglobin: 8.7 g/dL (09-03 @ 10:16)  Hemoglobin: 8.7 g/dL (09-03 @ 05:20)  Hemoglobin: 8.5 g/dL (09-02 @ 03:59)  Hemoglobin: 7.1 g/dL (09-01 @ 04:57)  Hemoglobin: 7.4 g/dL (08-31 @ 16:10)      09-03    134<L>  |  105  |  109<H>  ----------------------------<  122<H>  3.9   |  17<L>  |  4.24<H>    Ca    5.9<LL>      03 Sep 2023 05:20  Phos  5.5     09-03  Mg     2.4     09-03    TPro  5.6<L>  /  Alb  1.8<L>  /  TBili  0.6  /  DBili  x   /  AST  10  /  ALT  14  /  AlkPhos  139<H>  09-03    Creatinine Trend: 4.24<--, 3.89<--, 3.96<--, 4.00<--, 4.13<--, 4.29<--    COAGS:           T(C): 36.5 (09-04-23 @ 05:20), Max: 36.7 (09-03-23 @ 20:35)  HR: 78 (09-04-23 @ 05:20) (77 - 78)  BP: 98/61 (09-04-23 @ 05:20) (94/62 - 104/69)  RR: 18 (09-04-23 @ 05:20) (16 - 18)  SpO2: 95% (09-04-23 @ 05:20) (95% - 96%)  Wt(kg): --    I&O's Summary    03 Sep 2023 07:01  -  04 Sep 2023 07:00  --------------------------------------------------------  IN: 0 mL / OUT: 1800 mL / NET: -1800 mL        Appearance: Normal appearing adult male in no acute distress  HEENT:   Normal oral mucosa, PERRL, EOMI	  Lymphatic: No lymphadenopathy , no edema  Cardiovascular: Normal S1 S2, No JVD, No murmurs , Peripheral pulses palpable 2+ bilaterally. Right IJ central line.  Respiratory: Lungs clear to auscultation, normal effort 	  Gastrointestinal:  Soft, mildly tender + BS	  Skin: No rashes, No ecchymoses, No cyanosis, cool to touch  Musculoskeletal: Normal range of motion, normal strength  Psychiatry:  Mood & affect appropriate      TELEMETRY: sinus tachycardia, short burst of NSVT	    ECG: sinus rhythm. 	    Echo:  DIMENSIONS:  Dimensions:     Normal Values:  LA:     3.9 cm    2.0 - 4.0 cm  Ao:     3.6 cm    2.0 - 3.8 cm  SEPTUM: 1.1 cm    0.6 - 1.2 cm  PWT:    0.8 cm    0.6 - 1.1 cm  LVIDd:  4.8 cm    3.0 - 5.6 cm  LVIDs:  4.4 cm    1.8 - 4.0 cm      Derived Variables:  LVMI: 76 g/m2  RWT: 0.33  Ejection Fraction Visual Estimate: 15-20 %  Ejection Fraction Reid: 20 %    ------------------------------------------------------------------------  OBSERVATIONS:  Mitral Valve: Normal mitral valve. Mild mitral  regurgitation.  Aortic Root: Aortic Root: 3.6 cm.    Aortic Valve: Normal trileaflet aortic valve.  Left Atrium: Normal left atrium.  LA volume index = 21  cc/m2.  Left Ventricle: Severe global left ventricular systolic  dysfunction. Ultrasound LV opacification agent was used to  enhance endocardial definition. Severe global hypokinesia.  Normal left ventricular internal dimensions and wall  thicknesses. Grade I diastolic dysfunction (Impaired  relaxation, mild).  Right Heart: Normal right atrium. Normal right ventricular  size and systolic function (TAPSE 1.9 cm). There is mild  tricuspid regurgitation. There is mild pulmonic  regurgitation.  Pericardium/PleuraNormal pericardium with no pericardial  effusion.  Hemodynamic: RV systolic pressure is normal at  33 mm Hg.      ASSESSMENT/PLAN: Mr Carrasco is a pleasant 63y Male here with neutropenic fever and mixed shock picture: septic + cardiogenic.  He has newly diagnosed congestive heart failure, with severe cardiomyopathy, EF 15-20%, LV nondilated.  He has acute on chronic CKD- his baseline Creat was 1, last year.  This admission, Creat 3.5-4.  Unknown interim values.  ABX per ID   Managing residual vasodilatory state from sepsis with hydrocortisone injections. No longer requiring titratable vasopressors/inotropes.  Once off of all BP-support Rx, recommend to start low dose CHF beta blockers (Toprol XL 25mg daily).    Prior to discharge we may consider starting pre-load/after-load reducing drugs.    Long term - Denosumab can cause CHF.  Erleada and Denosumab can also accelerate/destablize coronary artery disease.  Should adjust his Onc plan accordingly.  Palliative care weighing in on prognosis and symptom management.   Cardiology  Date of service 9/4      pt denies chest pain and feeling better compared to ICU stay.  patient/family leaning toward hospice care.       calamine/zinc oxide Lotion 1 Application(s) Topical two times a day PRN  cefTRIAXone   IVPB 1000 milliGRAM(s) IV Intermittent every 24 hours  fentaNYL   Patch  25 MICROgram(s)/Hr 1 Patch Transdermal every 72 hours  filgrastim-sndz (ZARXIO) Injectable 480 MICROGram(s) SubCutaneous daily  hydrocortisone sodium succinate Injectable 50 milliGRAM(s) IV Push every 6 hours  HYDROmorphone  Injectable 1 milliGRAM(s) IV Push every 4 hours PRN  lidocaine 2% Jelly 5 milliLiter(s) IntraUrethral every 12 hours PRN  melatonin 3 milliGRAM(s) Oral at bedtime  nystatin    Suspension 913710 Unit(s) Swish and Swallow four times a day  polyethylene glycol 3350 17 Gram(s) Oral daily  senna 2 Tablet(s) Oral at bedtime                            8.7    3.51  )-----------( 21       ( 03 Sep 2023 10:16 )             25.0       Hemoglobin: 8.7 g/dL (09-03 @ 10:16)  Hemoglobin: 8.7 g/dL (09-03 @ 05:20)  Hemoglobin: 8.5 g/dL (09-02 @ 03:59)  Hemoglobin: 7.1 g/dL (09-01 @ 04:57)  Hemoglobin: 7.4 g/dL (08-31 @ 16:10)      09-03    134<L>  |  105  |  109<H>  ----------------------------<  122<H>  3.9   |  17<L>  |  4.24<H>    Ca    5.9<LL>      03 Sep 2023 05:20  Phos  5.5     09-03  Mg     2.4     09-03    TPro  5.6<L>  /  Alb  1.8<L>  /  TBili  0.6  /  DBili  x   /  AST  10  /  ALT  14  /  AlkPhos  139<H>  09-03    Creatinine Trend: 4.24<--, 3.89<--, 3.96<--, 4.00<--, 4.13<--, 4.29<--    COAGS:       T(C): 36.5 (09-04-23 @ 05:20), Max: 36.7 (09-03-23 @ 20:35)  HR: 78 (09-04-23 @ 05:20) (77 - 78)  BP: 98/61 (09-04-23 @ 05:20) (94/62 - 104/69)  RR: 18 (09-04-23 @ 05:20) (16 - 18)  SpO2: 95% (09-04-23 @ 05:20) (95% - 96%)  Wt(kg): --    I&O's Summary    03 Sep 2023 07:01  -  04 Sep 2023 07:00  --------------------------------------------------------  IN: 0 mL / OUT: 1800 mL / NET: -1800 mL        Appearance: Normal appearing adult male in no acute distress  HEENT:   Normal oral mucosa, PERRL, EOMI	  Lymphatic: No lymphadenopathy , no edema  Cardiovascular: Normal S1 S2, No JVD, No murmurs , Peripheral pulses palpable 2+ bilaterally. Right IJ central line.  Respiratory: Lungs clear to auscultation, normal effort 	  Gastrointestinal:  Soft, mildly tender + BS	  Skin: No rashes, No ecchymoses, No cyanosis, cool to touch  Musculoskeletal: Normal range of motion, normal strength  Psychiatry:  Mood & affect appropriate      TELEMETRY: sinus tachycardia, short burst of NSVT	    ECG: sinus rhythm. 	    Echo:  DIMENSIONS:  Dimensions:     Normal Values:  LA:     3.9 cm    2.0 - 4.0 cm  Ao:     3.6 cm    2.0 - 3.8 cm  SEPTUM: 1.1 cm    0.6 - 1.2 cm  PWT:    0.8 cm    0.6 - 1.1 cm  LVIDd:  4.8 cm    3.0 - 5.6 cm  LVIDs:  4.4 cm    1.8 - 4.0 cm      Derived Variables:  LVMI: 76 g/m2  RWT: 0.33  Ejection Fraction Visual Estimate: 15-20 %  Ejection Fraction Reid: 20 %    ------------------------------------------------------------------------  OBSERVATIONS:  Mitral Valve: Normal mitral valve. Mild mitral  regurgitation.  Aortic Root: Aortic Root: 3.6 cm.    Aortic Valve: Normal trileaflet aortic valve.  Left Atrium: Normal left atrium.  LA volume index = 21  cc/m2.  Left Ventricle: Severe global left ventricular systolic  dysfunction. Ultrasound LV opacification agent was used to  enhance endocardial definition. Severe global hypokinesia.  Normal left ventricular internal dimensions and wall  thicknesses. Grade I diastolic dysfunction (Impaired  relaxation, mild).  Right Heart: Normal right atrium. Normal right ventricular  size and systolic function (TAPSE 1.9 cm). There is mild  tricuspid regurgitation. There is mild pulmonic  regurgitation.  Pericardium/PleuraNormal pericardium with no pericardial  effusion.  Hemodynamic: RV systolic pressure is normal at  33 mm Hg.      ASSESSMENT/PLAN: Mr Carrasco is a pleasant 63y Male here with neutropenic fever and mixed shock picture: septic + cardiogenic.  He has newly diagnosed congestive heart failure, with severe cardiomyopathy, EF 15-20%, LV nondilated.  He has acute on chronic CKD- his baseline Creat was 1, last year.  This admission, Creat 3.5-4.  Unknown interim values.  ABX per ID   Managing residual vasodilatory state from sepsis with hydrocortisone injections. No longer requiring titratable vasopressors/inotropes.  Once off of all BP-support Rx, recommend to start low dose CHF beta blockers (Toprol XL 25mg daily).    Prior to discharge we may consider starting pre-load/after-load reducing drugs.    Long term - Denosumab can cause CHF.  Erleada and Denosumab can also accelerate/destablize coronary artery disease.  Should adjust his Onc plan accordingly.  Palliative care weighing in on prognosis and symptom management.

## 2023-09-04 NOTE — PROGRESS NOTE ADULT - NS ATTEND OPT1 GEN_ALL_CORE
I independently performed the documented:
I attest my time as attending is greater than 50% of the total combined time spent on qualifying patient care activities by the PA/NP and attending.

## 2023-09-04 NOTE — PROGRESS NOTE ADULT - SUBJECTIVE AND OBJECTIVE BOX
Feels weak, with burning at urination  Does not appear septic    Still with thrombocytopenia  Other counts are apparently resolving  Complete Blood Count (09.04.23 @ 07:44)   Nucleated RBC: 0 /100 WBCs  WBC Count: 8.38 K/uL  RBC Count: 2.95 M/uL  Hemoglobin: 9.2 g/dL  Hematocrit: 26.8 %  Mean Cell Volume: 90.8 fl  Mean Cell Hemoglobin: 31.2 pg  Mean Cell Hemoglobin Conc: 34.3 gm/dL  Red Cell Distrib Width: 19.1 %  Platelet Count - Automated: 7: TYPE:(C=Critical, N=Notification, A=Abnormal) c   TESTS: _plt   DATE/TIME CALLED: _09/04/2023 08:38:24 EDT   CALLED TO: _mychal mitchell   READ BACK (2 Patient Identifiers)(Y/N): _y   READ BACK VALUES (Y/N): _y

## 2023-09-04 NOTE — PROGRESS NOTE ADULT - ASSESSMENT
1. JOSE ANGEL due to ATN from septic shock   CT Scan shows left mild hydroureteronephrosis with collapsed bladder.  SCr improving to 3.9mg/dl from 4.1mg/d. Cr flor again today with reduced urine.  -FeNa >1%. spot protein to creatinine ratio elevated to > 6 so repeat after infection resolved.  -Adjust meds to eGFR and avoid IV Gadolinium contrast, NSAIDs, and phosphate enema.  -Monitor I/O's daily.   -Monitor SMA daily.    2. Hypotension due to septic shock  -on two pressor, weaning off.  -monitor BP.    3. Hyponatremia possible multifactorial due to hypovolemia and high ADH state with lack of free water excretion.  -Na improving to 134.  -urine lytes noted.   -Check Seurm Na daily  bid. Monitor I/O's daily. Avoid overcorrection of NA (8-10meq/day).    4. HAGMA due to Lactic Acidosis  Improved.  -monitor CO2 and ABG.    5. Hyperphosphatemia:  Phos is 5.1meq/L, should improve with renal recovery.  -corrected Ca is 7.6 mg/dL   - Follow up Ca levels.   1. JOSE ANGEL due to ATN from septic shock   CT Scan shows left mild hydroureteronephrosis with collapsed bladder.  SCr improving to 3.9mg/dl from 4.1mg/d. Cr flor again yesterday but urine Output is adequate.  -FeNa >1%. spot protein to creatinine ratio elevated to > 6 so repeat after infection resolved.  -Adjust meds to eGFR and avoid IV Gadolinium contrast, NSAIDs, and phosphate enema.  -Monitor I/O's daily.   -Monitor SMA daily.    2. Hypotension due to septic shock  -on two pressor, weaning off.  -monitor BP.    3. Hyponatremia possible multifactorial due to hypovolemia and high ADH state with lack of free water excretion.  -Na improving to 134.  -urine lytes noted.   -Check Seurm Na daily  bid. Monitor I/O's daily. Avoid overcorrection of NA (8-10meq/day).    4. HAGMA due to Lactic Acidosis  Improved.  -monitor CO2 and ABG.    5. Hyperphosphatemia:  Phos is 5.1meq/L, should improve with renal recovery.  -corrected Ca is 7.6 mg/dL   - Follow up Ca levels.

## 2023-09-05 DIAGNOSIS — R57.0 CARDIOGENIC SHOCK: ICD-10-CM

## 2023-09-05 DIAGNOSIS — Z29.9 ENCOUNTER FOR PROPHYLACTIC MEASURES, UNSPECIFIED: ICD-10-CM

## 2023-09-05 DIAGNOSIS — N40.1 BENIGN PROSTATIC HYPERPLASIA WITH LOWER URINARY TRACT SYMPTOMS: ICD-10-CM

## 2023-09-05 DIAGNOSIS — A41.9 SEPSIS, UNSPECIFIED ORGANISM: ICD-10-CM

## 2023-09-05 DIAGNOSIS — R10.2 PELVIC AND PERINEAL PAIN: ICD-10-CM

## 2023-09-05 DIAGNOSIS — E66.9 OBESITY, UNSPECIFIED: ICD-10-CM

## 2023-09-05 DIAGNOSIS — N17.9 ACUTE KIDNEY FAILURE, UNSPECIFIED: ICD-10-CM

## 2023-09-05 DIAGNOSIS — Z02.9 ENCOUNTER FOR ADMINISTRATIVE EXAMINATIONS, UNSPECIFIED: ICD-10-CM

## 2023-09-05 DIAGNOSIS — D61.810 ANTINEOPLASTIC CHEMOTHERAPY INDUCED PANCYTOPENIA: ICD-10-CM

## 2023-09-05 LAB
ALBUMIN SERPL ELPH-MCNC: 2.1 G/DL — LOW (ref 3.5–5)
ALP SERPL-CCNC: 166 U/L — HIGH (ref 40–120)
ALT FLD-CCNC: 16 U/L DA — SIGNIFICANT CHANGE UP (ref 10–60)
ANION GAP SERPL CALC-SCNC: 16 MMOL/L — SIGNIFICANT CHANGE UP (ref 5–17)
AST SERPL-CCNC: 10 U/L — SIGNIFICANT CHANGE UP (ref 10–40)
BILIRUB SERPL-MCNC: 0.5 MG/DL — SIGNIFICANT CHANGE UP (ref 0.2–1.2)
BUN SERPL-MCNC: 124 MG/DL — HIGH (ref 7–18)
CALCIUM SERPL-MCNC: 6.1 MG/DL — CRITICAL LOW (ref 8.4–10.5)
CHLORIDE SERPL-SCNC: 103 MMOL/L — SIGNIFICANT CHANGE UP (ref 96–108)
CO2 SERPL-SCNC: 18 MMOL/L — LOW (ref 22–31)
CREAT SERPL-MCNC: 4.17 MG/DL — HIGH (ref 0.5–1.3)
EGFR: 15 ML/MIN/1.73M2 — LOW
GLUCOSE SERPL-MCNC: 149 MG/DL — HIGH (ref 70–99)
HCT VFR BLD CALC: 27.6 % — LOW (ref 39–50)
HGB BLD-MCNC: 9.2 G/DL — LOW (ref 13–17)
MCHC RBC-ENTMCNC: 31 PG — SIGNIFICANT CHANGE UP (ref 27–34)
MCHC RBC-ENTMCNC: 33.3 GM/DL — SIGNIFICANT CHANGE UP (ref 32–36)
MCV RBC AUTO: 92.9 FL — SIGNIFICANT CHANGE UP (ref 80–100)
NRBC # BLD: 0 /100 WBCS — SIGNIFICANT CHANGE UP (ref 0–0)
PLATELET # BLD AUTO: 26 K/UL — LOW (ref 150–400)
POTASSIUM SERPL-MCNC: 3.5 MMOL/L — SIGNIFICANT CHANGE UP (ref 3.5–5.3)
POTASSIUM SERPL-SCNC: 3.5 MMOL/L — SIGNIFICANT CHANGE UP (ref 3.5–5.3)
PROT SERPL-MCNC: 6.1 G/DL — SIGNIFICANT CHANGE UP (ref 6–8.3)
RBC # BLD: 2.97 M/UL — LOW (ref 4.2–5.8)
RBC # FLD: 18.9 % — HIGH (ref 10.3–14.5)
SODIUM SERPL-SCNC: 137 MMOL/L — SIGNIFICANT CHANGE UP (ref 135–145)
WBC # BLD: 14.98 K/UL — HIGH (ref 3.8–10.5)
WBC # FLD AUTO: 14.98 K/UL — HIGH (ref 3.8–10.5)

## 2023-09-05 PROCEDURE — 99233 SBSQ HOSP IP/OBS HIGH 50: CPT

## 2023-09-05 RX ORDER — HYDROCORTISONE 20 MG
50 TABLET ORAL
Refills: 0 | Status: DISCONTINUED | OUTPATIENT
Start: 2023-09-06 | End: 2023-09-06

## 2023-09-05 RX ORDER — PANTOPRAZOLE SODIUM 20 MG/1
40 TABLET, DELAYED RELEASE ORAL
Refills: 0 | Status: DISCONTINUED | OUTPATIENT
Start: 2023-09-05 | End: 2023-09-15

## 2023-09-05 RX ORDER — ACETAMINOPHEN 500 MG
1000 TABLET ORAL EVERY 8 HOURS
Refills: 0 | Status: DISCONTINUED | OUTPATIENT
Start: 2023-09-05 | End: 2023-09-15

## 2023-09-05 RX ORDER — OXYCODONE HYDROCHLORIDE 5 MG/1
10 TABLET ORAL EVERY 6 HOURS
Refills: 0 | Status: DISCONTINUED | OUTPATIENT
Start: 2023-09-05 | End: 2023-09-12

## 2023-09-05 RX ORDER — HYDROCORTISONE 20 MG
50 TABLET ORAL
Refills: 0 | Status: DISCONTINUED | OUTPATIENT
Start: 2023-09-05 | End: 2023-09-05

## 2023-09-05 RX ADMIN — Medication 3 MILLIGRAM(S): at 21:19

## 2023-09-05 RX ADMIN — Medication 20 MILLIEQUIVALENT(S): at 05:24

## 2023-09-05 RX ADMIN — Medication 480 MICROGRAM(S): at 12:21

## 2023-09-05 RX ADMIN — Medication 1000 MILLIGRAM(S): at 13:22

## 2023-09-05 RX ADMIN — CEFTRIAXONE 100 MILLIGRAM(S): 500 INJECTION, POWDER, FOR SOLUTION INTRAMUSCULAR; INTRAVENOUS at 18:42

## 2023-09-05 RX ADMIN — Medication 100 MILLIGRAM(S): at 21:19

## 2023-09-05 RX ADMIN — Medication 100 MILLIGRAM(S): at 05:26

## 2023-09-05 RX ADMIN — Medication 50 MILLIGRAM(S): at 05:23

## 2023-09-05 RX ADMIN — Medication 50 MILLIGRAM(S): at 12:21

## 2023-09-05 RX ADMIN — Medication 1000 MILLIGRAM(S): at 12:22

## 2023-09-05 RX ADMIN — Medication 100 MILLIGRAM(S): at 13:07

## 2023-09-05 NOTE — PROGRESS NOTE ADULT - SUBJECTIVE AND OBJECTIVE BOX
Patient is a 63y old  Male who presents with a chief complaint of Septic Shock 2/2 neutropenic fever (05 Sep 2023       INTERVAL HPI/OVERNIGHT EVENTS: no new complaints. Patient seen at bedside. Pending LTC placement with hospice. JOSE ANGEL is worsening today     MEDICATIONS  (STANDING):  cefTRIAXone   IVPB 1000 milliGRAM(s) IV Intermittent every 24 hours  fentaNYL   Patch  25 MICROgram(s)/Hr 1 Patch Transdermal every 72 hours  hydrocortisone sodium succinate Injectable 50 milliGRAM(s) IV Push two times a day  melatonin 3 milliGRAM(s) Oral at bedtime  phenazopyridine 100 milliGRAM(s) Oral every 8 hours  polyethylene glycol 3350 17 Gram(s) Oral daily  senna 2 Tablet(s) Oral at bedtime    MEDICATIONS  (PRN):  acetaminophen     Tablet .. 1000 milliGRAM(s) Oral every 8 hours PRN Moderate Pain (4 - 6)  calamine/zinc oxide Lotion 1 Application(s) Topical two times a day PRN Rash and/or Itching  lidocaine 2% Jelly 5 milliLiter(s) IntraUrethral every 12 hours PRN Pain at koehler site      __________________________________________________  REVIEW OF SYSTEMS:    CONSTITUTIONAL: No fever,   EYES: no acute visual disturbances  NECK: No pain or stiffness  RESPIRATORY: No cough; No shortness of breath  CARDIOVASCULAR: No chest pain, no palpitations  GASTROINTESTINAL: No pain. No nausea or vomiting; No diarrhea, lethargic today   : Pain around the tip of the penis   NEUROLOGICAL: No headache or numbness, no tremors  MUSCULOSKELETAL: No joint pain, no muscle pain  GENITOURINARY: no dysuria, no frequency, no hesitancy  PSYCHIATRY: no depression , no anxiety  ALL OTHER  ROS negative        Vital Signs Last 24 Hrs  T(C): 36.5 (05 Sep 2023 05:36), Max: 36.7 (04 Sep 2023 20:34)  T(F): 97.7 (05 Sep 2023 05:36), Max: 98.1 (04 Sep 2023 20:34)  HR: 67 (05 Sep 2023 05:36) (67 - 68)  BP: 104/64 (05 Sep 2023 05:36) (104/64 - 110/67)  BP(mean): --  RR: 18 (05 Sep 2023 05:36) (17 - 18)  SpO2: 95% (05 Sep 2023 05:36) (95% - 97%)    Parameters below as of 05 Sep 2023 05:36  Patient On (Oxygen Delivery Method): room air        ________________________________________________  PHYSICAL EXAM:  GENERAL: NAD  HEENT: Normocephalic;  conjunctivae and sclerae clear; moist mucous membranes;   NECK : supple  CHEST/LUNG: dec breath sounds at bases  HEART: S1 S2  regular; no murmurs, gallops or rubs  ABDOMEN: Soft, Nontender, Nondistended; Bowel sounds present  EXTREMITIES: no cyanosis; no edema; no calf tenderness, generalized weakness   SKIN: warm and dry; no rash  NERVOUS SYSTEM:  Awake and alert; Oriented  to place, person and time ; with periods of confusion     _________________________________________________  LABS:                        9.2    14.98 )-----------( 26       ( 05 Sep 2023 06:39 )             27.6     09-05    137  |  103  |  124<H>  ----------------------------<  149<H>  3.5   |  18<L>  |  4.17<H>    Ca    6.1<LL>      05 Sep 2023 06:39  Phos  5.6     09-04  Mg     2.5     09-04    TPro  6.1  /  Alb  2.1<L>  /  TBili  0.5  /  DBili  x   /  AST  10  /  ALT  16  /  AlkPhos  166<H>  09-05      Urinalysis Basic - ( 05 Sep 2023 06:39 )    Color: x / Appearance: x / SG: x / pH: x  Gluc: 149 mg/dL / Ketone: x  / Bili: x / Urobili: x   Blood: x / Protein: x / Nitrite: x   Leuk Esterase: x / RBC: x / WBC x   Sq Epi: x / Non Sq Epi: x / Bacteria: x      CAPILLARY BLOOD GLUCOSE            RADIOLOGY & ADDITIONAL TESTS:    < from: US Renal (08.31.23 @ 14:16) >  IMPRESSION:  Increased renal cortical echogenicity compatible with medical renal   disease.    Mild left hydronephrosis.    Urinary bladder is incompletely collapsed around a Koehler catheter.      < end of copied text >      Imaging Personally Reviewed:  YES/NO    Consultant(s) Notes Reviewed:   YES/ No    Care Discussed with Consultants :     Plan of care was discussed with patient and /or primary care giver; all questions and concerns were addressed and care was aligned with patient's wishes.

## 2023-09-05 NOTE — PROGRESS NOTE ADULT - ASSESSMENT
1. JOSE ANGEL due to ATN from septic shock   CT Scan shows left mild hydroureteronephrosis with collapsed bladder.  -Scr remains unchanged at 4.1mg/dL with good UO. possible renal recovery.  -FeNa >1%. spot protein to creatinine ratio elevated; rpt after infection resolved.  -Adjust meds to eGFR and avoid IV Gadolinium contrast, NSAIDs, and phosphate enema.  -Monitor I/O's daily.   -Monitor SMA daily.  2. Hypotension due to septic shock  - bp is stable. off pressor  -monitor BP.  3. Hyponatremia possible multifactorial due to hypovolemia and high ADH state with lack of free water excretion.  -Na stable 137.  -urine lytes noted.   -Check Seurm Na daily. Monitor I/O's daily. Avoid overcorrection of NA (8-10meq/day)  4. HAGMA due to Lactic Acidosis  -abg noted  -CO2 is stable.  -monitor CO2 and abg.     d/w pt and wife at bedside.

## 2023-09-05 NOTE — PROGRESS NOTE ADULT - PROBLEM SELECTOR PLAN 4
SCr ~ 4.17  No IV fluids due to EF 15-20%  c/w koehler catheter for comfort care   Good urine output  nephrology  Plan as above

## 2023-09-05 NOTE — PROGRESS NOTE ADULT - PROBLEM SELECTOR PLAN 3
Pt with pain which is somatic and neuropathic in nature due to.   Opioid pain recommendations   - Continue Tramadol 50mg PO q 8 hours PRN moderate pain. Monitor for sedation/ respiratory depression.   - Continue Oxycodone 5 mg PO q 6 hours PRN severe pain. Monitor for sedation/ respiratory depression.   Non-opioid pain recommendations   - Continue Toradol 15mg IVP q 6 hours PRN severe pain  - Continue Acetaminophen 1 gram PO q 6 hours for 24 hours only. Monitor LFTs  - Continue Celebrex 200mg PO daily  - Continue Gabapentin 100mg po q 8 hours. Monitor renal function.   - Continue Lidoderm 5% patch daily.   Bowel Regimen  - Continue Miralax 17G PO daily  - Continue Senna 2 tablets at bedtime for constipation  - Continue Magnesium hydroxide 30ml daily PRN constipation  Mild pain (score 1-3)  - Non-pharmacological pain treatment recommendations  - Warm/ Cool packs PRN   - Repositioning extremity, elevation, imagery, relaxation, distraction.  - Physical therapy OOB if no contraindications   Recommendations discussed with primary team and RN

## 2023-09-05 NOTE — PROGRESS NOTE ADULT - ASSESSMENT
62 yo M with pmhx of prostate CA with mets to liver and bone, on Cabazitaxel q3 weeks, presents to the ED for generalized weakness and pain. Follows up with Oncologist Dr. Nasir Gondal. Admitted to ICU for septic shock 2/2  urosepsis vs cardiogenic shock, and febrile neutropenia.    Blood cultures show no growth to date, urine culture revealed e.coli 10,000-49,000 Started on meropenam as of 9/2 but switched to rocephin 1g qd for 7 days to cover uti. ID Dr. Manrique following.    Patient complained of substernal chest pain for a day non radiating. 2x trops were elevated, EKG NSR, repeat troponin shows downgrading trops. PERCY shows EF: 15-20% this likely due chemotherapy side effects. Chest pain is likely due to demand on the heart from severe anemia and it has resolved. Patient is hemodynamically stable.     Also noted with pancytopenia, patient has been afebrile, his leukocyte count has been slowly increasing with filgastrim, received 2 units prbc and 7 units platelets, both still low but increasing.     Patient's JOSE ANGEL is slowly worsening possibly due to volume loss vs low appetite.  GOC has been discussed with family they are leaning towards hospice. Patient is now for LTC with hospice. Palliative and  following.

## 2023-09-05 NOTE — PROGRESS NOTE ADULT - PROBLEM SELECTOR PLAN 3
Plan as above - congestive heart failure, with severe cardiomyopathy, EF 15-20%, LV nondilated  - avoid IV fluids congestive heart failure, with severe cardiomyopathy, EF 15-20%, LV nondilated  avoid IV fluids  cardiology Dr. Skelton following

## 2023-09-05 NOTE — PROGRESS NOTE ADULT - PROBLEM SELECTOR PLAN 2
p/w tachycardia, fever, low blood pressure  urine culture revealed e.coli 10,000-49,000   blood cultures NGTD   started on meropenem switched to Rocephin   Taper off steroids   -hydrocortisone 50 q 12 hrs x 2 days 9/5-9/7  -hydrocortisone 25 mg q 12 hrs x 2 days 9/8-9/10  -prednisone 20 mg x 2 days 9/10-9/12  -prednisone 10 mg x 2 days 9/12-9/15  -prednisone 5 mg daily 9/16  ID Dr. Manrique following

## 2023-09-05 NOTE — PROGRESS NOTE ADULT - RESPIRATORY
clear to auscultation bilaterally/no wheezes/no rales/no rhonchi

## 2023-09-05 NOTE — PROGRESS NOTE ADULT - PROBLEM SELECTOR PLAN 2
Mild pain   - Non-pharmacological pain treatment recommendations  - Warm/ Cool packs PRN   - Repositioning extremity, elevation, imagery, relaxation, distraction.  - Physical therapy OOB if no contraindications   Recommendations discussed with primary team and RN.

## 2023-09-05 NOTE — PROGRESS NOTE ADULT - SUBJECTIVE AND OBJECTIVE BOX
Source of information: DHAVAL BIRD, Chart review, spouse and daughter  Patient language: English  : n/a    HPI:  This is a 62 yo M with pmhx of prostate CA with mets to liver and bone, on Cabazitaxel q3 weeks, presents to the ED for generalized weakness and pain. Per wife, who is at bedside, states patient had an episode of loss of consciousness and fall, with head trauma. Patient also mentions having dysuria since yesterday. patient started this regimen in march, and this is the first time he has had such symptoms. He follows up with Oncologist Dr. Nasir Gondal. He received his last chemo treatment 1 week ago.  (30 Aug 2023 01:34)    This is a Patient is a 63y old  Male who presents with a chief complaint of Septic Shock 2/2 neutropenic fever (02 Sep 2023 12:23)    Patient admitted to ICU for septic shock 2/2 neutropenic fever and urosepsis. Patient downgraded to the medicine floor on 9/2/23. Urine culture positive for E.coli on Rocephin 1g q 24hrs x 7 days. Pain consulted on 9/2 for chest and throat pain. Pt seen and examined at bedside this morning, wife present. Today, patient reports suprapubic pain 4/10 SCALE USED: (1-10 VNRS). Patient describes pain as localized to the suprapubic region constant and sharp in quality. The pain is alleviated by pain medications and exacerbated by movement and worse post void. Pt is tolerating PO diet. Pt denies SOB, nausea, vomiting and constipation. Last BM 9/4.Patient stated goal for pain control: to be able to take deep breaths, get out of bed to chair and ambulate with tolerable pain control. Per ISTOP pt was on Fentanyl patch 75 mcg/hr at home and Percocet.      PAST MEDICAL & SURGICAL HISTORY:    CA of prostate    Enlarged prostate with lower urinary tract symptoms (LUTS)    No significant past surgical history    FAMILY HISTORY:    Family history of hypertension (Mother)    Social History:   [X]Denies ETOH use, illicit drug use, and smoking     Allergies    No Known Allergies    MEDICATIONS  (STANDING):  cefTRIAXone   IVPB 1000 milliGRAM(s) IV Intermittent every 24 hours  fentaNYL   Patch  25 MICROgram(s)/Hr 1 Patch Transdermal every 72 hours  hydrocortisone sodium succinate Injectable 50 milliGRAM(s) IV Push two times a day  melatonin 3 milliGRAM(s) Oral at bedtime  phenazopyridine 100 milliGRAM(s) Oral every 8 hours  polyethylene glycol 3350 17 Gram(s) Oral daily  senna 2 Tablet(s) Oral at bedtime    MEDICATIONS  (PRN):  acetaminophen     Tablet .. 1000 milliGRAM(s) Oral every 8 hours PRN Moderate Pain (4 - 6)  calamine/zinc oxide Lotion 1 Application(s) Topical two times a day PRN Rash and/or Itching  lidocaine 2% Jelly 5 milliLiter(s) IntraUrethral every 12 hours PRN Pain at koehler site    Vital Signs Last 24 Hrs  T(C): 36.5 (05 Sep 2023 05:36), Max: 36.7 (04 Sep 2023 20:34)  T(F): 97.7 (05 Sep 2023 05:36), Max: 98.1 (04 Sep 2023 20:34)  HR: 67 (05 Sep 2023 05:36) (67 - 73)  BP: 104/64 (05 Sep 2023 05:36) (104/64 - 110/67)  BP(mean): --  RR: 18 (05 Sep 2023 05:36) (16 - 18)  SpO2: 95% (05 Sep 2023 05:36) (95% - 97%)    Parameters below as of 05 Sep 2023 05:36  Patient On (Oxygen Delivery Method): room air    SARS-CoV-2: NotDetec (30 Aug 2023 09:30)    LABS: Reviewed                          9.2    14.98 )-----------( 26       ( 05 Sep 2023 06:39 )             27.6     09-05    137  |  103  |  124<H>  ----------------------------<  149<H>  3.5   |  18<L>  |  4.17<H>    Ca    6.1<LL>      05 Sep 2023 06:39  Phos  5.6     09-04  Mg     2.5     09-04    TPro  6.1  /  Alb  2.1<L>  /  TBili  0.5  /  DBili  x   /  AST  10  /  ALT  16  /  AlkPhos  166<H>  09-05    LIVER FUNCTIONS - ( 05 Sep 2023 06:39 )  Alb: 2.1 g/dL / Pro: 6.1 g/dL / ALK PHOS: 166 U/L / ALT: 16 U/L DA / AST: 10 U/L / GGT: x           Urinalysis Basic - ( 05 Sep 2023 06:39 )    Color: x / Appearance: x / SG: x / pH: x  Gluc: 149 mg/dL / Ketone: x  / Bili: x / Urobili: x   Blood: x / Protein: x / Nitrite: x   Leuk Esterase: x / RBC: x / WBC x   Sq Epi: x / Non Sq Epi: x / Bacteria: x    CAPILLARY BLOOD GLUCOSE    SARS-CoV-2: NotDetec (30 Aug 2023 09:30)    Radiology: Reviewed    < from: US Renal (08.31.23 @ 14:16) >    ACC: [id number] EXAM:  US KIDNEY(S)   ORDERED BY: HIPOLITO ROSALES     ACC: [id number] EXAM:  US URINARY BLADDER   ORDERED BY: LULU COPPOLA     PROCEDURE DATE:  08/31/2023      INTERPRETATION:  CLINICAL INFORMATION: Urinary retention, acute kidney   injury    COMPARISON: CT abdomen pelvis dated 8/29/2023    TECHNIQUE: Sonography of the kidneys and bladder.    FINDINGS:    There is bilateral increased renal cortical echogenicity.    Right kidney measures 12.7 cm in length. No hydronephrosis or shadowing   stone.    Left kidney measures 11.9 cm in length. There is mild hydronephrosis.    Urinary bladder is incompletely collapsed around a Koehler catheter.    IMPRESSION:  Increased renal cortical echogenicity compatible with medical renal   disease.    Mild left hydronephrosis.    Urinary bladder is incompletely collapsed around a Koehler catheter.    --- End of Report ---    ISAIAS KELLEY MD; Attending Radiologist  This document has been electronically signed. Aug 31 2023  2:39PM    < end of copied text >    < from: CT Cervical Spine No Cont (08.29.23 @ 18:05) >    ACC: [id number] EXAM:  CT CERVICAL SPINE   ORDERED BY: DENNY BRITO   ACC: [id number] EXAM:  CT BRAIN   ORDERED BY: DENNY BRITO     PROCEDURE DATE:  08/29/2023      INTERPRETATION:  CLINICAL INDICATION: Fall with head trauma, prostate   cancer    Brain CT:    5mm axial sections of the brain were obtained from base to vertex,   without the intravenous administration of contrast material. Coronal and   sagittal computer generated reconstructed views are available.    No prior brain imaging is available for comparison.    The fourth, third and lateral ventricles are normal size and position.   There is no hemorrhage, mass or shift of the midline structures. There is   normal gray white matter differentiation. Bone window examination is   remarkable for a foci of increased density within the frontal calvarium   bilaterally suggestive of sclerotic calvarial metastases.    Cervical spine CT:    Serial thin sections on a multi slice scanner were obtained through the   cervical spine from the C1 to the level T2 in a stacked axial fashion   reformatted at 1.25 mm sections with sagittal and coronal computer   generated reconstructed views.    There is normal alignment of the vertebral bodies and facet joints.    The vertebral bodies are normal in height. There are multiple foci of   increased density within the vertebral bodies in the C3, C4, C7 and T1   and T2 vertebral bodies and are consistent with sclerotic metastasis. No   paraspinal masses are identified. Mild degenerative uncal vertebral joint   and facet changes are noted. There is mild reversal the normal   straightening of the normal cervical lordosis. No acute fractures or   dislocations are identified.    IMPRESSION:     Brain CT: No intracranial hemorrhage or mass. Calvarial metastases.    Cervical spine CT: No acute fractures or dislocations. Degenerative   changes. Osseous metastases.    --- End of Report ---      KIRT PAK MD; Attending Radiologist  This document has been electronically signed. Aug29 2023  6:10PM    < end of copied text >      ORT Score -   Family Hx of substance abuse	Female	      Male  Alcohol 	                                           1                     3  Illegal drugs	                                   2                     3  Rx drugs                                           4 	                  4  Personal Hx of substance abuse		  Alcohol 	                                          3	                  3  Illegal drugs                                     4	                  4  Rx drugs                                            5 	                  5  Age between 16- 45 years	           1                     1  hx preadolescent sexual abuse	   3 	                  0  Psychological disease		  ADD, OCD, bipolar, schizophrenia   2	          2  Depression                                           1 	          1  Total: 0    a score of 3 or lower indicates low risk for opioid abuse		  a score of 4-7 indicates moderate risk for opioid abuse		  a score of 8 or higher indicates high risk for opioid abuse    REVIEW OF SYSTEMS:  CONSTITUTIONAL: No fever or fatigue  HEENT:  No difficulty hearing, no change in vision  NECK: No pain or stiffness  RESPIRATORY: + cough, no shortness of breath  CARDIOVASCULAR: denies chest pain, no palpitations, dizziness  GASTROINTESTINAL: + loss of appetite,  No abdominal or epigastric pain. No nausea, vomiting; No diarrhea or constipation.   GENITOURINARY: + suprapubic pain, koehler catheter intact,   MUSCULOSKELETAL: No joint pain or swelling; No muscle, back, or extremity pain, no upper or lower motor strength weakness, no saddle anesthesia, bowel/bladder incontinence, no falls   NEURO: No headaches, No numbness/tingling b/l LE, No weakness    PHYSICAL EXAM:  GENERAL:  Alert & Oriented X4, cooperative, NAD, Good concentration. Speech is clear.   RESPIRATORY: Respirations even and unlabored. Clear to auscultation bilaterally; No rales, rhonchi, wheezing, or rubs  CARDIOVASCULAR: Normal S1/S2, regular rate and rhythm; No murmurs, rubs, or gallops. No JVD.   GASTROINTESTINAL:  Soft, Nontender, Nondistended; Bowel sounds present  GENITOURINARY: + suprapubic pain with palpation, koehler catheter in place draining blood tinged urine  PERIPHERAL VASCULAR:  Extremities warm without edema. 2+ Peripheral Pulses, No cyanosis, No calf tenderness  MUSCULOSKELETAL: Motor Strength 5/5 B/L upper and lower extremities; moves all extremities equally against gravity; ROM intact; negative SLR; No tenderness on palpation of all joints.   SKIN: Warm, dry, intact. No rashes, lesions, scars or wounds.     Risk factors associated with adverse outcomes related to opioid treatment  [ ] Concurrent benzodiazepine use  [ ] History/ Active substance use or alcohol use disorder  [ ] Psychiatric co-morbidity  [ ] Sleep apnea  [ ] COPD  [ ] BMI> 35  [ ] Liver dysfunction  [X] Renal dysfunction  [ ] CHF  [ ] Smoker  [X]  Age > 60 years    [X]  [place]  Reviewed and Copied to Chart. See below.    Plan of care and goal oriented pain management treatment options were discussed with patient and /or primary care giver; all questions and concerns were addressed and care was aligned with patient's wishes.    Educated patient on goal oriented pain management treatment options         09-05-23 @ 13:37 Source of information: DHAVAL BIRD, Chart review, spouse and daughter  Patient language: English  : n/a    HPI:  This is a 62 yo M with pmhx of prostate CA with mets to liver and bone, on Cabazitaxel q3 weeks, presents to the ED for generalized weakness and pain. Per wife, who is at bedside, states patient had an episode of loss of consciousness and fall, with head trauma. Patient also mentions having dysuria since yesterday. patient started this regimen in march, and this is the first time he has had such symptoms. He follows up with Oncologist Dr. Nasir Gondal. He received his last chemo treatment 1 week ago.  (30 Aug 2023 01:34)    This is a Patient is a 63y old  Male who presents with a chief complaint of Septic Shock 2/2 neutropenic fever (02 Sep 2023 12:23)    Patient admitted to ICU for septic shock 2/2 neutropenic fever and urosepsis. Patient downgraded to the medicine floor on 9/2/23. Urine culture positive for E.coli on Rocephin 1g q 24hrs x 7 days. Pain consulted on 9/2 for chest and throat pain. Pt seen and examined at bedside this morning, wife present. Today, patient reports suprapubic pain 4/10 SCALE USED: (1-10 VNRS). Patient describes pain as localized to the suprapubic region constant and sharp in quality. The pain is alleviated by pain medications and exacerbated by movement and worse post void. Pt is tolerating PO diet. Pt denies SOB, nausea, vomiting and constipation. Last BM 9/4.Patient stated goal for pain control: to be able to take deep breaths, get out of bed to chair and ambulate with tolerable pain control. Per ISTOP pt was on Fentanyl patch 75 mcg/hr at home and Percocet.      PAST MEDICAL & SURGICAL HISTORY:    CA of prostate    Enlarged prostate with lower urinary tract symptoms (LUTS)    No significant past surgical history    FAMILY HISTORY:    Family history of hypertension (Mother)    Social History:   [X]Denies ETOH use, illicit drug use, and smoking     Allergies    No Known Allergies    MEDICATIONS  (STANDING):  cefTRIAXone   IVPB 1000 milliGRAM(s) IV Intermittent every 24 hours  fentaNYL   Patch  25 MICROgram(s)/Hr 1 Patch Transdermal every 72 hours  hydrocortisone sodium succinate Injectable 50 milliGRAM(s) IV Push two times a day  melatonin 3 milliGRAM(s) Oral at bedtime  phenazopyridine 100 milliGRAM(s) Oral every 8 hours  polyethylene glycol 3350 17 Gram(s) Oral daily  senna 2 Tablet(s) Oral at bedtime    MEDICATIONS  (PRN):  acetaminophen     Tablet .. 1000 milliGRAM(s) Oral every 8 hours PRN Moderate Pain (4 - 6)  calamine/zinc oxide Lotion 1 Application(s) Topical two times a day PRN Rash and/or Itching  lidocaine 2% Jelly 5 milliLiter(s) IntraUrethral every 12 hours PRN Pain at koehler site    Vital Signs Last 24 Hrs  T(C): 36.5 (05 Sep 2023 05:36), Max: 36.7 (04 Sep 2023 20:34)  T(F): 97.7 (05 Sep 2023 05:36), Max: 98.1 (04 Sep 2023 20:34)  HR: 67 (05 Sep 2023 05:36) (67 - 73)  BP: 104/64 (05 Sep 2023 05:36) (104/64 - 110/67)  BP(mean): --  RR: 18 (05 Sep 2023 05:36) (16 - 18)  SpO2: 95% (05 Sep 2023 05:36) (95% - 97%)    Parameters below as of 05 Sep 2023 05:36  Patient On (Oxygen Delivery Method): room air    SARS-CoV-2: NotDetec (30 Aug 2023 09:30)    LABS: Reviewed                          9.2    14.98 )-----------( 26       ( 05 Sep 2023 06:39 )             27.6     09-05    137  |  103  |  124<H>  ----------------------------<  149<H>  3.5   |  18<L>  |  4.17<H>    Ca    6.1<LL>      05 Sep 2023 06:39  Phos  5.6     09-04  Mg     2.5     09-04    TPro  6.1  /  Alb  2.1<L>  /  TBili  0.5  /  DBili  x   /  AST  10  /  ALT  16  /  AlkPhos  166<H>  09-05    LIVER FUNCTIONS - ( 05 Sep 2023 06:39 )  Alb: 2.1 g/dL / Pro: 6.1 g/dL / ALK PHOS: 166 U/L / ALT: 16 U/L DA / AST: 10 U/L / GGT: x           Urinalysis Basic - ( 05 Sep 2023 06:39 )    Color: x / Appearance: x / SG: x / pH: x  Gluc: 149 mg/dL / Ketone: x  / Bili: x / Urobili: x   Blood: x / Protein: x / Nitrite: x   Leuk Esterase: x / RBC: x / WBC x   Sq Epi: x / Non Sq Epi: x / Bacteria: x    CAPILLARY BLOOD GLUCOSE    SARS-CoV-2: NotDetec (30 Aug 2023 09:30)    Radiology: Reviewed    < from: US Renal (08.31.23 @ 14:16) >    ACC: [id number] EXAM:  US KIDNEY(S)   ORDERED BY: HIPOLITO ROSALES     ACC: [id number] EXAM:  US URINARY BLADDER   ORDERED BY: LULU COPPOLA     PROCEDURE DATE:  08/31/2023      INTERPRETATION:  CLINICAL INFORMATION: Urinary retention, acute kidney   injury    COMPARISON: CT abdomen pelvis dated 8/29/2023    TECHNIQUE: Sonography of the kidneys and bladder.    FINDINGS:    There is bilateral increased renal cortical echogenicity.    Right kidney measures 12.7 cm in length. No hydronephrosis or shadowing   stone.    Left kidney measures 11.9 cm in length. There is mild hydronephrosis.    Urinary bladder is incompletely collapsed around a Koehler catheter.    IMPRESSION:  Increased renal cortical echogenicity compatible with medical renal   disease.    Mild left hydronephrosis.    Urinary bladder is incompletely collapsed around a Koehler catheter.    --- End of Report ---    ISAIAS KELLEY MD; Attending Radiologist  This document has been electronically signed. Aug 31 2023  2:39PM    < end of copied text >    < from: CT Cervical Spine No Cont (08.29.23 @ 18:05) >    ACC: [id number] EXAM:  CT CERVICAL SPINE   ORDERED BY: DENNY BRITO   ACC: [id number] EXAM:  CT BRAIN   ORDERED BY: DENNY BRITO     PROCEDURE DATE:  08/29/2023      INTERPRETATION:  CLINICAL INDICATION: Fall with head trauma, prostate   cancer    Brain CT:    5mm axial sections of the brain were obtained from base to vertex,   without the intravenous administration of contrast material. Coronal and   sagittal computer generated reconstructed views are available.    No prior brain imaging is available for comparison.    The fourth, third and lateral ventricles are normal size and position.   There is no hemorrhage, mass or shift of the midline structures. There is   normal gray white matter differentiation. Bone window examination is   remarkable for a foci of increased density within the frontal calvarium   bilaterally suggestive of sclerotic calvarial metastases.    Cervical spine CT:    Serial thin sections on a multi slice scanner were obtained through the   cervical spine from the C1 to the level T2 in a stacked axial fashion   reformatted at 1.25 mm sections with sagittal and coronal computer   generated reconstructed views.    There is normal alignment of the vertebral bodies and facet joints.    The vertebral bodies are normal in height. There are multiple foci of   increased density within the vertebral bodies in the C3, C4, C7 and T1   and T2 vertebral bodies and are consistent with sclerotic metastasis. No   paraspinal masses are identified. Mild degenerative uncal vertebral joint   and facet changes are noted. There is mild reversal the normal   straightening of the normal cervical lordosis. No acute fractures or   dislocations are identified.    IMPRESSION:     Brain CT: No intracranial hemorrhage or mass. Calvarial metastases.    Cervical spine CT: No acute fractures or dislocations. Degenerative   changes. Osseous metastases.    --- End of Report ---      KIRT PAK MD; Attending Radiologist  This document has been electronically signed. Aug29 2023  6:10PM    < end of copied text >      ORT Score -   Family Hx of substance abuse	Female	      Male  Alcohol 	                                           1                     3  Illegal drugs	                                   2                     3  Rx drugs                                           4 	                  4  Personal Hx of substance abuse		  Alcohol 	                                          3	                  3  Illegal drugs                                     4	                  4  Rx drugs                                            5 	                  5  Age between 16- 45 years	           1                     1  hx preadolescent sexual abuse	   3 	                  0  Psychological disease		  ADD, OCD, bipolar, schizophrenia   2	          2  Depression                                           1 	          1  Total: 0    a score of 3 or lower indicates low risk for opioid abuse		  a score of 4-7 indicates moderate risk for opioid abuse		  a score of 8 or higher indicates high risk for opioid abuse    REVIEW OF SYSTEMS:  CONSTITUTIONAL: No fever or fatigue  HEENT:  No difficulty hearing, no change in vision  NECK: No pain or stiffness  RESPIRATORY: + cough, no shortness of breath  CARDIOVASCULAR: denies chest pain, no palpitations, dizziness  GASTROINTESTINAL: + loss of appetite,  No abdominal or epigastric pain. No nausea, vomiting; No diarrhea or constipation.   GENITOURINARY: + suprapubic pain, koehler catheter intact,   MUSCULOSKELETAL: No joint pain or swelling; No muscle, back, or extremity pain, no upper or lower motor strength weakness, no saddle anesthesia, bowel/bladder incontinence, no falls   NEURO: No headaches, No numbness/tingling b/l LE, No weakness    PHYSICAL EXAM:  GENERAL:  Alert & Oriented X4, cooperative, NAD, Good concentration. Speech is clear.   RESPIRATORY: Respirations even and unlabored. Clear to auscultation bilaterally; No rales, rhonchi, wheezing, or rubs  CARDIOVASCULAR: Normal S1/S2, regular rate and rhythm; No murmurs, rubs, or gallops. No JVD.   GASTROINTESTINAL:  Soft, Nontender, Nondistended; Bowel sounds present  GENITOURINARY: + suprapubic pain with palpation, koehler catheter in place draining blood tinged urine  PERIPHERAL VASCULAR:  Extremities warm without edema. 2+ Peripheral Pulses, No cyanosis, No calf tenderness  MUSCULOSKELETAL: Motor Strength 5/5 B/L upper and lower extremities; moves all extremities equally against gravity; ROM intact; negative SLR; No tenderness on palpation of all joints.   SKIN: Warm, dry, intact. No rashes, lesions, scars or wounds.     Risk factors associated with adverse outcomes related to opioid treatment  [ ] Concurrent benzodiazepine use  [ ] History/ Active substance use or alcohol use disorder  [ ] Psychiatric co-morbidity  [ ] Sleep apnea  [ ] COPD  [ ] BMI> 35  [ ] Liver dysfunction  [X] Renal dysfunction  [ ] CHF  [ ] Smoker  [X]  Age > 60 years    [X]  [place]  Reviewed and Copied to Chart. See below.    Plan of care and goal oriented pain management treatment options were discussed with patient and /or primary care giver; all questions and concerns were addressed and care was aligned with patient's wishes.    Educated patient on goal oriented pain management treatment options     09-05-23 @ 13:37

## 2023-09-05 NOTE — PROGRESS NOTE ADULT - PROBLEM SELECTOR PLAN 1
likely secondary to Cabazitaxel  Continue supportive care  Transfuse platelets to  > 20K- No bleeding at this time noted  Filgastrim dc'd today, WBC stable   Hgb stable, no active bleeding noted  pain management following   palliative following , pending LTC with hospice  QMA following

## 2023-09-05 NOTE — PROGRESS NOTE ADULT - ASSESSMENT
Septic shock - resolved  UTI      Plan - Cont  Rocephin 1gm iv q24hrs till tomorrow then DC all abxs.  DC Neupogen   reconsult prn.

## 2023-09-05 NOTE — PROGRESS NOTE ADULT - SUBJECTIVE AND OBJECTIVE BOX
CARDIOLOGY  *****************    DATE OF SERVICE: 09-05-23    Patient denies chest pain or shortness of breath.  Review of symptoms otherwise negative.    acetaminophen     Tablet .. 1000 milliGRAM(s) Oral every 8 hours PRN  calamine/zinc oxide Lotion 1 Application(s) Topical two times a day PRN  cefTRIAXone   IVPB 1000 milliGRAM(s) IV Intermittent every 24 hours  fentaNYL   Patch  25 MICROgram(s)/Hr 1 Patch Transdermal every 72 hours  filgrastim-sndz (ZARXIO) Injectable 480 MICROGram(s) SubCutaneous daily  hydrocortisone sodium succinate Injectable 50 milliGRAM(s) IV Push every 6 hours  lidocaine 2% Jelly 5 milliLiter(s) IntraUrethral every 12 hours PRN  melatonin 3 milliGRAM(s) Oral at bedtime  phenazopyridine 100 milliGRAM(s) Oral every 8 hours  polyethylene glycol 3350 17 Gram(s) Oral daily  senna 2 Tablet(s) Oral at bedtime                            9.2    14.98 )-----------( 26       ( 05 Sep 2023 06:39 )             27.6       Hemoglobin: 9.2 g/dL (09-05 @ 06:39)  Hemoglobin: 8.3 g/dL (09-04 @ 15:30)  Hemoglobin: 9.2 g/dL (09-04 @ 07:44)  Hemoglobin: 8.7 g/dL (09-03 @ 10:16)  Hemoglobin: 8.7 g/dL (09-03 @ 05:20)      09-05    137  |  103  |  124<H>  ----------------------------<  149<H>  3.5   |  18<L>  |  4.17<H>    Ca    6.1<LL>      05 Sep 2023 06:39  Phos  5.6     09-04  Mg     2.5     09-04    TPro  6.1  /  Alb  2.1<L>  /  TBili  0.5  /  DBili  x   /  AST  10  /  ALT  16  /  AlkPhos  166<H>  09-05    Creatinine Trend: 4.17<--, 4.37<--, 4.24<--, 3.89<--, 3.96<--, 4.00<--    COAGS:       T(C): 36.5 (09-05-23 @ 05:36), Max: 36.7 (09-04-23 @ 20:34)  HR: 67 (09-05-23 @ 05:36) (67 - 73)  BP: 104/64 (09-05-23 @ 05:36) (104/64 - 110/67)  RR: 18 (09-05-23 @ 05:36) (16 - 18)  SpO2: 95% (09-05-23 @ 05:36) (95% - 97%)  Wt(kg): --    I&O's Summary    04 Sep 2023 07:01  -  05 Sep 2023 07:00  --------------------------------------------------------  IN: 0 mL / OUT: 1750 mL / NET: -1750 mL    HEENT:  (-)icterus (-)pallor  CV: N S1 S2 1/6 CAMRYN (+)2 Pulses B/l  Resp:  Clear to ausculatation B/L, normal effort  GI: (+) BS Soft, NT, ND  Lymph:  (-)Edema, (-)obvious lymphadenopathy  Skin: Warm to touch, Normal turgor  Psych: Appropriate mood and affect        ASSESSMENT/PLAN: Mr Carrasco is a pleasant 63y Male here with neutropenic fever and mixed shock picture: septic + cardiogenic.  He has newly diagnosed congestive heart failure, with severe cardiomyopathy, EF 15-20%, LV nondilated.  He has acute on chronic CKD- his baseline Creat was 1, last year.  This admission, Creat 3.5-4.  Unknown interim values.  ABX per ID   Managing residual vasodilatory state from sepsis with hydrocortisone injections. No longer requiring titratable vasopressors/inotropes.  recommend to start low dose CHF beta blockers (Toprol XL 25mg daily). if bP remains stable  Prior to discharge we may consider starting pre-load/after-load reducing drugs.    Long term - Denosumab can cause CHF.  Erleada and Denosumab can also accelerate/destablize coronary artery disease.  Should adjust his Onc plan accordingly.  Palliative care weighing in on prognosis and symptom management.    Peter Skelton MD, FACC  BEEPER [phone]

## 2023-09-05 NOTE — PROGRESS NOTE ADULT - PROBLEM SELECTOR PLAN 5
SCr ~ 4.17  No IV fluids due to EF 15-20%  c/w koehler catheter for comfort care   Good urine output  nephrology

## 2023-09-05 NOTE — PROGRESS NOTE ADULT - SUBJECTIVE AND OBJECTIVE BOX
NP Note discussed with  primary attending    Patient is a 63y old  Male who presents with a chief complaint of Septic Shock 2/2 neutropenic fever (05 Sep 2023 13:29)      INTERVAL HPI/OVERNIGHT EVENTS: no new complaints. Patient seen at bedside. Pending LTC placement with hospice. JOSE ANGEL is worsening today     MEDICATIONS  (STANDING):  cefTRIAXone   IVPB 1000 milliGRAM(s) IV Intermittent every 24 hours  fentaNYL   Patch  25 MICROgram(s)/Hr 1 Patch Transdermal every 72 hours  hydrocortisone sodium succinate Injectable 50 milliGRAM(s) IV Push two times a day  melatonin 3 milliGRAM(s) Oral at bedtime  phenazopyridine 100 milliGRAM(s) Oral every 8 hours  polyethylene glycol 3350 17 Gram(s) Oral daily  senna 2 Tablet(s) Oral at bedtime    MEDICATIONS  (PRN):  acetaminophen     Tablet .. 1000 milliGRAM(s) Oral every 8 hours PRN Moderate Pain (4 - 6)  calamine/zinc oxide Lotion 1 Application(s) Topical two times a day PRN Rash and/or Itching  lidocaine 2% Jelly 5 milliLiter(s) IntraUrethral every 12 hours PRN Pain at koehler site      __________________________________________________  REVIEW OF SYSTEMS:    CONSTITUTIONAL: No fever,   EYES: no acute visual disturbances  NECK: No pain or stiffness  RESPIRATORY: No cough; No shortness of breath  CARDIOVASCULAR: No chest pain, no palpitations  GASTROINTESTINAL: No pain. No nausea or vomiting; No diarrhea, lethargic today   : Pain around the tip of the penis   NEUROLOGICAL: No headache or numbness, no tremors  MUSCULOSKELETAL: No joint pain, no muscle pain  GENITOURINARY: no dysuria, no frequency, no hesitancy  PSYCHIATRY: no depression , no anxiety  ALL OTHER  ROS negative        Vital Signs Last 24 Hrs  T(C): 36.5 (05 Sep 2023 05:36), Max: 36.7 (04 Sep 2023 20:34)  T(F): 97.7 (05 Sep 2023 05:36), Max: 98.1 (04 Sep 2023 20:34)  HR: 67 (05 Sep 2023 05:36) (67 - 68)  BP: 104/64 (05 Sep 2023 05:36) (104/64 - 110/67)  BP(mean): --  RR: 18 (05 Sep 2023 05:36) (17 - 18)  SpO2: 95% (05 Sep 2023 05:36) (95% - 97%)    Parameters below as of 05 Sep 2023 05:36  Patient On (Oxygen Delivery Method): room air        ________________________________________________  PHYSICAL EXAM:  GENERAL: NAD  HEENT: Normocephalic;  conjunctivae and sclerae clear; moist mucous membranes;   NECK : supple  CHEST/LUNG: Clear to auscultation bilaterally with good air entry   HEART: S1 S2  regular; no murmurs, gallops or rubs  ABDOMEN: Soft, Nontender, Nondistended; Bowel sounds present  EXTREMITIES: no cyanosis; no edema; no calf tenderness, generalized weakness   SKIN: warm and dry; no rash  NERVOUS SYSTEM:  Awake and alert; Oriented  to place, person and time ; with periods of confusion     _________________________________________________  LABS:                        9.2    14.98 )-----------( 26       ( 05 Sep 2023 06:39 )             27.6     09-05    137  |  103  |  124<H>  ----------------------------<  149<H>  3.5   |  18<L>  |  4.17<H>    Ca    6.1<LL>      05 Sep 2023 06:39  Phos  5.6     09-04  Mg     2.5     09-04    TPro  6.1  /  Alb  2.1<L>  /  TBili  0.5  /  DBili  x   /  AST  10  /  ALT  16  /  AlkPhos  166<H>  09-05      Urinalysis Basic - ( 05 Sep 2023 06:39 )    Color: x / Appearance: x / SG: x / pH: x  Gluc: 149 mg/dL / Ketone: x  / Bili: x / Urobili: x   Blood: x / Protein: x / Nitrite: x   Leuk Esterase: x / RBC: x / WBC x   Sq Epi: x / Non Sq Epi: x / Bacteria: x      CAPILLARY BLOOD GLUCOSE            RADIOLOGY & ADDITIONAL TESTS:    < from: US Renal (08.31.23 @ 14:16) >  IMPRESSION:  Increased renal cortical echogenicity compatible with medical renal   disease.    Mild left hydronephrosis.    Urinary bladder is incompletely collapsed around a Koehler catheter.      < end of copied text >      Imaging Personally Reviewed:  YES/NO    Consultant(s) Notes Reviewed:   YES/ No    Care Discussed with Consultants :     Plan of care was discussed with patient and /or primary care giver; all questions and concerns were addressed and care was aligned with patient's wishes.

## 2023-09-05 NOTE — PROGRESS NOTE ADULT - ASSESSMENT
Confidential Drug Utilization Report  Search Terms: ESTEBAN BIRD, 1960 Search Date: 09/02/2023 12:22:58 PM  The Drug Utilization Report below displays all of the controlled substance prescriptions, if any, that your patient has filled in the last twelve months. The information displayed on this report is compiled from pharmacy submissions to the Department, and accurately reflects the information as submitted by the pharmacies.    This report was requested by: Eveline Rose | Reference #: [id number]    You have not added a MARIA INES number. Keeping your MARIA INES number(s) up to date on the My MARIA INES # tab will enable the separation of your prescriptions from others in the search results.    Practitioner Count: 0  Pharmacy Count: 0  Current Opioid Prescriptions: 0  Current Benzodiazepine Prescriptions: 0  Current Stimulant Prescriptions: 0      Patient Demographic Information (PDI)       PDI	First Name	Last Name	Birth Date	Gender	Street Address	[facility] Code  A	Esteban Bird	1960	Male	[address]	[place]	[id number]    Prescription Information      PDI Filter:    PDI	Current Rx	Drug Type	Rx Written	Rx Dispensed	Drug	Quantity	Days Supply	Prescriber Name	Prescriber MARIA INES #	Payment Method	Dispenser  A	N	O	03/10/2023	03/28/2023	fentanyl 75 mcg/hr patch	10	30	Dick Jack	[id number]	Insurance	[id number]  A	N	O	02/13/2023	02/13/2023	oxycodone-acetaminophen  mg tab	120	30	GondalJuancho MD	[id number]	Insurance	[id number]  A	N	O	11/21/2022	11/21/2022	tramadol hcl 50 mg tablet	20	6	GondalJuancho MD	[id number]	Insurance	[id number]  A	N	O	10/31/2022	10/31/2022	oxycodone-acetaminophen  mg tab	120	30	GondalJuancho MD	[id number]	Insurance	[id number]  A	N	O	09/06/2022	09/06/2022	oxycodone-acetaminophen  mg tab	120	30	GondalJuancho MD	[id number]	Insurance	[id number]    * - Details of Drug Type : O = Opioid, B = Benzodiazepine, S = Stimulant    * - Drugs marked with an asterisk are compound drugs. If the compound drug is made up of more than one controlled substance, then each controlled substance will be a separate row in the table.

## 2023-09-05 NOTE — PROGRESS NOTE ADULT - PROBLEM SELECTOR PLAN 1
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 Patient presented to the ED after a witnessed episode of loss of consciousness and fall, with head trauma. CT head demonstrated no intracranial hemorrhage or mass. Calvarial metastases. US Kidney and Bladder demonstate increased renal cortical echogenicity compatible with medical renal disease. Mild left hydronephrosis. Urine culture positive for E.coli on Rocephin 1g q 24hrs x 7 days.  Patient follows oncologist Dr. Nasir Gondal as outpatient. Per ISTOP pt was previously on Fentanyl patch 75 mcg/hr and Percocet.    Opioid pain recommendations:  - Continue Fentanyl patch 25 mcg/hr. Monitor for sedation/ respiratory depression.   - Discontinue IV Dilaudid  Non-opioid pain recommendations   - Avoid NSAIDS due to JOSE ANGEL, Cr. 4.17  - Start Acetaminophen 1g q 8hrs PRN for moderate pain.   Bowel Regimen  - Miralax 17G PO daily  - Senna 2 tablets at bedtime for constipation Pt with suprapubic pain which is somatic in nature due to history of prostate CA with mets to liver and bone, on Cabazitaxel q3 weeks. Patient presented to the ED after a witnessed episode of loss of consciousness and fall, with head trauma. CT head demonstrated no intracranial hemorrhage or mass. Calvarial metastases. US Kidney and Bladder demonstrate increased renal cortical echogenicity compatible with medical renal disease. Mild left hydronephrosis. Urine culture positive for E.coli on Rocephin 1g q 24hrs x 7 days.  Patient follows oncologist Dr. Nasir Gondal as outpatient. Per ISTOP pt was previously on Fentanyl patch 75 mcg/hr and Percocet.    Opioid pain recommendations:  - Continue Fentanyl patch 25 mcg/hr. Monitor for sedation/ respiratory depression.   - Discontinue IV Dilaudid  - Start oxycodone 10mg PO q6h PRN severe pain.   Non-opioid pain recommendations   - Avoid NSAIDS due to JOSE ANGEL, Cr. 4.17  - Start Acetaminophen 1g q 8hrs PRN for moderate pain.   Bowel Regimen  - Miralax 17G PO daily  - Senna 2 tablets at bedtime for constipation Pt with suprapubic pain which is somatic in nature due to history of prostate CA with mets to liver and bone, on Cabazitaxel q3 weeks. Patient presented to the ED after a witnessed episode of loss of consciousness and fall, with head trauma. CT head demonstrated no intracranial hemorrhage or mass. Calvarial metastases. US Kidney and Bladder demonstrate increased renal cortical echogenicity compatible with medical renal disease. Mild left hydronephrosis. Urine culture positive for E.coli on Rocephin 1g q 24hrs x 7 days.  Patient follows oncologist Dr. Nasir Gondal as outpatient. Per ISTOP pt was previously on Fentanyl patch 75 mcg/hr and Percocet.    Opioid pain recommendations:  - Continue Fentanyl patch 25 mcg/hr. Monitor for sedation/ respiratory depression.   - Discontinue IV Dilaudid  - Start oxycodone 10mg PO q6h PRN severe pain. Monitor for sedation/ respiratory depression.   Non-opioid pain recommendations   - Avoid NSAIDS due to JOSE ANGEL, Cr. 4.17  - Start Acetaminophen 1g q 8hrs PRN for moderate pain.   Bowel Regimen  - Miralax 17G PO daily  - Senna 2 tablets at bedtime for constipation

## 2023-09-05 NOTE — PROGRESS NOTE ADULT - ASSESSMENT
complete note to follow    #VTE Prophylaxis     Assessment and Recommendation:   · Assessment	    #Met Prostate CA  #Pancytopenia- Sepsis and side  effects of chemotherapy- Cabazitaxel  s/p ICU  S/P PRBC transfusions  Wife and patient also report urethral  bleeding  s/p Zarxio, WBC now 14k, d/c'd zarxio  F/U platelets  If they fall below current level transfuse platelets  Continue antibiotics  plan for hospice    #VTE Prophylaxis    Thank you for the referral. Will continue to monitor the patient.  Please call with any questions [phone]  Above reviewed with Attending Dr. Tremayne [place]  [address] [facility], [address], [place], [place]  [phone]  *Note not finalized until signed by Attending Physician       Assessment and Recommendation:   · Assessment	    #Met Prostate CA  #Pancytopenia- Sepsis and side effects of chemotherapy  pt follows with Dr. Gondal on cabazitaxel  s/p ICU  S/P PRBC transfusions  s/p Zarxio, WBC now 14k, d/c'd zarxio  no active bleeding/ecchymosis  Peripheral Smear only rare schistocytes  ADAMATS-13 >10% not TTP  LFT's nl, Hepatitis (-)  DIC panel (-)  Head CT (-) for bleeding, does show calvarial mets  Rec's:  clinically declining, pt and wife have discussed [facility] with [facility] and have decided on hospice ad Dr. Gondal did not have other tx options   -Daily CBC  -Transfuse SDP if Plt <20k in setting of sepsis  -avoid non-essential meds that can exacerbate plt drop (i.e. H2 blocker)  -monitor plt count daily  -always check post SDP transfusion plt level within 1 hour to confirm adequate response  -Continue antibiotics  -Pain Mgmt  -plan for hospice inpt vs outpt    #VTE Prophylaxis    At this time will sign-off, please re-consult if needed  Thank you for the referral. Will continue to monitor the patient.  Please call with any questions [phone]  Above reviewed with Attending Dr. Tremayne [facility]  [address] [facility], [address], [place], [place]  [phone]  *Note not finalized until signed by Attending Physician

## 2023-09-05 NOTE — PROGRESS NOTE ADULT - PROBLEM SELECTOR PLAN 1
p/w tachycardia, fever, low blood pressure  urine culture revealed e.coli 10,000-49,000   blood cultures NGTD   started on meropenem switched to Rocephin   Taper off steroids   -hydrocortisone 50 q 12 hrs x 2 days 9/5-9/7  -hydrocortisone 25 mg q 12 hrs x 2 days 9/8-9/10  - prednisone 20 mg x 2 days 9/10-9/12  - prednisone 10 mg x 2 days 9/12-9/15  - prednisone 5 mg daily 9/16  ID Dr. Manrique following likely secondary to Cabazitaxel  Continue supportive care  Transfuse platelets to  > 20K- No bleeding at this time noted  Filgastrim dc'd today, WBC stable   Hgb stable, no active bleeding noted  pain management following   palliative following , pending LTC with hospice likely secondary to Cabazitaxel  Continue supportive care  Transfuse platelets to  > 20K- No bleeding at this time noted  Filgastrim dc'd today, WBC stable   Hgb stable, no active bleeding noted  pain management following   palliative following , pending LTC with hospice  [facility] following

## 2023-09-05 NOTE — PROGRESS NOTE ADULT - PROBLEM SELECTOR PLAN 2
likely secondary to Cabazitaxel  Continue supportive care  Transfuse platelets to  > 20K- No bleeding at this time noted  Filgastrim dc'd today, WBC stable   Hgb stable, no active bleeding noted  pain management following   palliative following , pending LTC with hospice p/w tachycardia, fever, low blood pressure  urine culture revealed e.coli 10,000-49,000   blood cultures NGTD   started on meropenem switched to Rocephin   Taper off steroids   -hydrocortisone 50 q 12 hrs x 2 days 9/5-9/7  -hydrocortisone 25 mg q 12 hrs x 2 days 9/8-9/10  -prednisone 20 mg x 2 days 9/10-9/12  -prednisone 10 mg x 2 days 9/12-9/15  -prednisone 5 mg daily 9/16  ID Dr. Manrique following

## 2023-09-05 NOTE — PROGRESS NOTE ADULT - PROBLEM SELECTOR PLAN 3
congestive heart failure, with severe cardiomyopathy, EF 15-20%, LV nondilated  avoid IV fluids  cardiology Dr. Skelton following

## 2023-09-05 NOTE — PROGRESS NOTE ADULT - SUBJECTIVE AND OBJECTIVE BOX
NEPHROLOGY MEDICAL CARE, [facility] - Dr. Shukri Fair/ Dr. Caterina Gibbons/ Dr. Papa Conley/ Dr. Lissa Rene    Date of Service: 09-05-23 @ 13:06    Patient was seen and examined at bedside.    CC: patient is okay and wife at bedside  pt is drinking more water.    Vital Signs Last 24 Hrs  T(C): 36.5 (05 Sep 2023 05:36), Max: 36.7 (04 Sep 2023 20:34)  T(F): 97.7 (05 Sep 2023 05:36), Max: 98.1 (04 Sep 2023 20:34)  HR: 67 (05 Sep 2023 05:36) (67 - 73)  BP: 104/64 (05 Sep 2023 05:36) (104/64 - 110/67)  BP(mean): --  RR: 18 (05 Sep 2023 05:36) (16 - 18)  SpO2: 95% (05 Sep 2023 05:36) (95% - 97%)    Parameters below as of 05 Sep 2023 05:36  Patient On (Oxygen Delivery Method): room air        09-04 @ 07:01  -  09-05 @ 07:00  --------------------------------------------------------  IN: 0 mL / OUT: 1750 mL / NET: -1750 mL        PHYSICAL EXAM:  General: No acute respiratory distress.  Eyes: conjunctiva and sclera clear  ENMT: Atraumatic, Normocephalic, supple,   Respiratory: Bilateral poor air entry; No rales, rhonchi, wheezing  Cardiovascular: S1S2+; no m/r/g  Gastrointestinal: Soft, Non-tender, Nondistended; Bowel sounds present  : koehler's cath with clear urine.  Neuro:  Awake, Alert & Oriented X3,   Ext:  trace edema, No Cyanosis  Skin: No visible rashes      MEDICATIONS:  MEDICATIONS  (STANDING):  cefTRIAXone   IVPB 1000 milliGRAM(s) IV Intermittent every 24 hours  fentaNYL   Patch  25 MICROgram(s)/Hr 1 Patch Transdermal every 72 hours  hydrocortisone sodium succinate Injectable 50 milliGRAM(s) IV Push every 6 hours  melatonin 3 milliGRAM(s) Oral at bedtime  phenazopyridine 100 milliGRAM(s) Oral every 8 hours  polyethylene glycol 3350 17 Gram(s) Oral daily  senna 2 Tablet(s) Oral at bedtime    MEDICATIONS  (PRN):  acetaminophen     Tablet .. 1000 milliGRAM(s) Oral every 8 hours PRN Moderate Pain (4 - 6)  calamine/zinc oxide Lotion 1 Application(s) Topical two times a day PRN Rash and/or Itching  lidocaine 2% Jelly 5 milliLiter(s) IntraUrethral every 12 hours PRN Pain at koehler site          LABS:                        9.2    14.98 )-----------( 26       ( 05 Sep 2023 06:39 )             27.6     09-05    137  |  103  |  124<H>  ----------------------------<  149<H>  3.5   |  18<L>  |  4.17<H>    Ca    6.1<LL>      05 Sep 2023 06:39  Phos  5.6     09-04  Mg     2.5     09-04    TPro  6.1  /  Alb  2.1<L>  /  TBili  0.5  /  DBili  x   /  AST  10  /  ALT  16  /  AlkPhos  166<H>  09-05      Urinalysis Basic - ( 05 Sep 2023 06:39 )    Color: x / Appearance: x / SG: x / pH: x  Gluc: 149 mg/dL / Ketone: x  / Bili: x / Urobili: x   Blood: x / Protein: x / Nitrite: x   Leuk Esterase: x / RBC: x / WBC x   Sq Epi: x / Non Sq Epi: x / Bacteria: x        Urine studies    PTH and Vit D:

## 2023-09-05 NOTE — PROGRESS NOTE ADULT - SUBJECTIVE AND OBJECTIVE BOX
Patient is a 63y old  Male who presents with a chief complaint of Septic Shock 2/2 neutropenic fever       SUBJECTIVE / OVERNIGHT EVENTS:      MEDICATIONS  (STANDING):  cefTRIAXone   IVPB 1000 milliGRAM(s) IV Intermittent every 24 hours  fentaNYL   Patch  25 MICROgram(s)/Hr 1 Patch Transdermal every 72 hours  filgrastim-sndz (ZARXIO) Injectable 480 MICROGram(s) SubCutaneous daily  hydrocortisone sodium succinate Injectable 50 milliGRAM(s) IV Push every 6 hours  melatonin 3 milliGRAM(s) Oral at bedtime  phenazopyridine 100 milliGRAM(s) Oral every 8 hours  polyethylene glycol 3350 17 Gram(s) Oral daily  senna 2 Tablet(s) Oral at bedtime    MEDICATIONS  (PRN):  acetaminophen     Tablet .. 1000 milliGRAM(s) Oral every 8 hours PRN Moderate Pain (4 - 6)  calamine/zinc oxide Lotion 1 Application(s) Topical two times a day PRN Rash and/or Itching  lidocaine 2% Jelly 5 milliLiter(s) IntraUrethral every 12 hours PRN Pain at koehler site    CAPILLARY BLOOD GLUCOSE        I&O's Summary    04 Sep 2023 07:01  -  05 Sep 2023 07:00  --------------------------------------------------------  IN: 0 mL / OUT: 1750 mL / NET: -1750 mL        PHYSICAL EXAM:  Vital Signs Last 24 Hrs  T(C): 36.5 (05 Sep 2023 05:36), Max: 36.7 (04 Sep 2023 20:34)  T(F): 97.7 (05 Sep 2023 05:36), Max: 98.1 (04 Sep 2023 20:34)  HR: 67 (05 Sep 2023 05:36) (67 - 73)  BP: 104/64 (05 Sep 2023 05:36) (104/64 - 110/67)  BP(mean): --  RR: 18 (05 Sep 2023 05:36) (16 - 18)  SpO2: 95% (05 Sep 2023 05:36) (95% - 97%)    Parameters below as of 05 Sep 2023 05:36  Patient On (Oxygen Delivery Method): room air          LABS:                        9.2    14.98 )-----------( 26       ( 05 Sep 2023 06:39 )             27.6     09-05    137  |  103  |  124<H>  ----------------------------<  149<H>  3.5   |  18<L>  |  4.17<H>    Ca    6.1<LL>      05 Sep 2023 06:39  Phos  5.6     09-04  Mg     2.5     09-04    TPro  6.1  /  Alb  2.1<L>  /  TBili  0.5  /  DBili  x   /  AST  10  /  ALT  16  /  AlkPhos  166<H>  09-05          Urinalysis Basic - ( 05 Sep 2023 06:39 )    Color: x / Appearance: x / SG: x / pH: x  Gluc: 149 mg/dL / Ketone: x  / Bili: x / Urobili: x   Blood: x / Protein: x / Nitrite: x   Leuk Esterase: x / RBC: x / WBC x   Sq Epi: x / Non Sq Epi: x / Bacteria: x        SARS-CoV-2: NotDetec (30 Aug 2023 09:30)             Patient is a 63y old  Male who presents with a chief complaint of Septic Shock 2/2 neutropenic fever       SUBJECTIVE / OVERNIGHT EVENTS: events noted. Pt has testicular pain and currently sleeping s/p pain medication. Plan is for hospice      MEDICATIONS  (STANDING):  cefTRIAXone   IVPB 1000 milliGRAM(s) IV Intermittent every 24 hours  fentaNYL   Patch  25 MICROgram(s)/Hr 1 Patch Transdermal every 72 hours  filgrastim-sndz (ZARXIO) Injectable 480 MICROGram(s) SubCutaneous daily  hydrocortisone sodium succinate Injectable 50 milliGRAM(s) IV Push every 6 hours  melatonin 3 milliGRAM(s) Oral at bedtime  phenazopyridine 100 milliGRAM(s) Oral every 8 hours  polyethylene glycol 3350 17 Gram(s) Oral daily  senna 2 Tablet(s) Oral at bedtime    MEDICATIONS  (PRN):  acetaminophen     Tablet .. 1000 milliGRAM(s) Oral every 8 hours PRN Moderate Pain (4 - 6)  calamine/zinc oxide Lotion 1 Application(s) Topical two times a day PRN Rash and/or Itching  lidocaine 2% Jelly 5 milliLiter(s) IntraUrethral every 12 hours PRN Pain at koehler site    CAPILLARY BLOOD GLUCOSE        I&O's Summary    04 Sep 2023 07:01  -  05 Sep 2023 07:00  --------------------------------------------------------  IN: 0 mL / OUT: 1750 mL / NET: -1750 mL        PHYSICAL EXAM:  Vital Signs Last 24 Hrs  T(C): 36.5 (05 Sep 2023 05:36), Max: 36.7 (04 Sep 2023 20:34)  T(F): 97.7 (05 Sep 2023 05:36), Max: 98.1 (04 Sep 2023 20:34)  HR: 67 (05 Sep 2023 05:36) (67 - 73)  BP: 104/64 (05 Sep 2023 05:36) (104/64 - 110/67)  BP(mean): --  RR: 18 (05 Sep 2023 05:36) (16 - 18)  SpO2: 95% (05 Sep 2023 05:36) (95% - 97%)    Parameters below as of 05 Sep 2023 05:36  Patient On (Oxygen Delivery Method): room air    PE: deferred d/t pain, see Medicine Note      LABS:                        9.2    14.98 )-----------( 26       ( 05 Sep 2023 06:39 )             27.6     09-05    137  |  103  |  124<H>  ----------------------------<  149<H>  3.5   |  18<L>  |  4.17<H>    Ca    6.1<LL>      05 Sep 2023 06:39  Phos  5.6     09-04  Mg     2.5     09-04    TPro  6.1  /  Alb  2.1<L>  /  TBili  0.5  /  DBili  x   /  AST  10  /  ALT  16  /  AlkPhos  166<H>  09-05          Urinalysis Basic - ( 05 Sep 2023 06:39 )    Color: x / Appearance: x / SG: x / pH: x  Gluc: 149 mg/dL / Ketone: x  / Bili: x / Urobili: x   Blood: x / Protein: x / Nitrite: x   Leuk Esterase: x / RBC: x / WBC x   Sq Epi: x / Non Sq Epi: x / Bacteria: x        SARS-CoV-2: NotDetec (30 Aug 2023 09:30)

## 2023-09-06 DIAGNOSIS — R53.81 OTHER MALAISE: ICD-10-CM

## 2023-09-06 DIAGNOSIS — I50.9 HEART FAILURE, UNSPECIFIED: ICD-10-CM

## 2023-09-06 DIAGNOSIS — G89.3 NEOPLASM RELATED PAIN (ACUTE) (CHRONIC): ICD-10-CM

## 2023-09-06 DIAGNOSIS — N17.9 ACUTE KIDNEY FAILURE, UNSPECIFIED: ICD-10-CM

## 2023-09-06 LAB
ALBUMIN SERPL ELPH-MCNC: 2.3 G/DL — LOW (ref 3.5–5)
ALP SERPL-CCNC: 204 U/L — HIGH (ref 40–120)
ALT FLD-CCNC: 14 U/L DA — SIGNIFICANT CHANGE UP (ref 10–60)
ANION GAP SERPL CALC-SCNC: 17 MMOL/L — SIGNIFICANT CHANGE UP (ref 5–17)
AST SERPL-CCNC: 12 U/L — SIGNIFICANT CHANGE UP (ref 10–40)
BILIRUB SERPL-MCNC: 0.5 MG/DL — SIGNIFICANT CHANGE UP (ref 0.2–1.2)
BUN SERPL-MCNC: 130 MG/DL — HIGH (ref 7–18)
CALCIUM SERPL-MCNC: 6.3 MG/DL — CRITICAL LOW (ref 8.4–10.5)
CHLORIDE SERPL-SCNC: 101 MMOL/L — SIGNIFICANT CHANGE UP (ref 96–108)
CO2 SERPL-SCNC: 18 MMOL/L — LOW (ref 22–31)
CREAT SERPL-MCNC: 3.73 MG/DL — HIGH (ref 0.5–1.3)
EGFR: 17 ML/MIN/1.73M2 — LOW
GLUCOSE SERPL-MCNC: 118 MG/DL — HIGH (ref 70–99)
HCT VFR BLD CALC: 28.3 % — LOW (ref 39–50)
HGB BLD-MCNC: 9.5 G/DL — LOW (ref 13–17)
MCHC RBC-ENTMCNC: 30.9 PG — SIGNIFICANT CHANGE UP (ref 27–34)
MCHC RBC-ENTMCNC: 33.6 GM/DL — SIGNIFICANT CHANGE UP (ref 32–36)
MCV RBC AUTO: 92.2 FL — SIGNIFICANT CHANGE UP (ref 80–100)
NRBC # BLD: 0 /100 WBCS — SIGNIFICANT CHANGE UP (ref 0–0)
PLATELET # BLD AUTO: 17 K/UL — CRITICAL LOW (ref 150–400)
POTASSIUM SERPL-MCNC: 3 MMOL/L — LOW (ref 3.5–5.3)
POTASSIUM SERPL-SCNC: 3 MMOL/L — LOW (ref 3.5–5.3)
PROT SERPL-MCNC: 6.4 G/DL — SIGNIFICANT CHANGE UP (ref 6–8.3)
RBC # BLD: 3.07 M/UL — LOW (ref 4.2–5.8)
RBC # FLD: 18.8 % — HIGH (ref 10.3–14.5)
SODIUM SERPL-SCNC: 136 MMOL/L — SIGNIFICANT CHANGE UP (ref 135–145)
WBC # BLD: 19.59 K/UL — HIGH (ref 3.8–10.5)
WBC # FLD AUTO: 19.59 K/UL — HIGH (ref 3.8–10.5)

## 2023-09-06 PROCEDURE — 99232 SBSQ HOSP IP/OBS MODERATE 35: CPT | Mod: 25

## 2023-09-06 PROCEDURE — 99232 SBSQ HOSP IP/OBS MODERATE 35: CPT

## 2023-09-06 RX ORDER — CALCIUM GLUCONATE 100 MG/ML
1 VIAL (ML) INTRAVENOUS ONCE
Refills: 0 | Status: DISCONTINUED | OUTPATIENT
Start: 2023-09-06 | End: 2023-09-06

## 2023-09-06 RX ORDER — POTASSIUM CHLORIDE 20 MEQ
40 PACKET (EA) ORAL EVERY 4 HOURS
Refills: 0 | Status: COMPLETED | OUTPATIENT
Start: 2023-09-06 | End: 2023-09-06

## 2023-09-06 RX ADMIN — Medication 40 MILLIEQUIVALENT(S): at 14:35

## 2023-09-06 RX ADMIN — Medication 100 MILLIGRAM(S): at 14:35

## 2023-09-06 RX ADMIN — Medication 40 MILLIEQUIVALENT(S): at 18:23

## 2023-09-06 RX ADMIN — OXYCODONE HYDROCHLORIDE 10 MILLIGRAM(S): 5 TABLET ORAL at 11:00

## 2023-09-06 RX ADMIN — Medication 100 MILLIGRAM(S): at 05:19

## 2023-09-06 RX ADMIN — Medication 50 MILLIGRAM(S): at 05:19

## 2023-09-06 RX ADMIN — Medication 3 MILLIGRAM(S): at 22:55

## 2023-09-06 RX ADMIN — OXYCODONE HYDROCHLORIDE 10 MILLIGRAM(S): 5 TABLET ORAL at 10:30

## 2023-09-06 RX ADMIN — Medication 100 MILLIGRAM(S): at 22:55

## 2023-09-06 RX ADMIN — PANTOPRAZOLE SODIUM 40 MILLIGRAM(S): 20 TABLET, DELAYED RELEASE ORAL at 05:19

## 2023-09-06 NOTE — PROVIDER CONTACT NOTE (CRITICAL VALUE NOTIFICATION) - DATE AND TIME:
"Hours of Care:  1900 - 0700    Temp:  [97.5  F (36.4  C)-98.3  F (36.8  C)] 97.7  F (36.5  C)  Pulse:  [56-68] 60  Resp:  [16-18] 18  BP: ()/(84-86) 100/86  SpO2:  [96 %-97 %] 96 %     D: 68 y.o. M with PMH of HFrEF secondary to NICM s/p HM3 LVAD, recurrent driveline infections, HTN, hypothyroid presents with increased weight gain, fatigue concerning for acute on chronic heart failure exacerbation.    I: Monitored vitals and assessed pt status.     PRN: Ambien  Tele: V Paced with occasional PVC  O2: Room air.   Mobility: Independent    A: Neuro: A/O x4.  Call light appropriate.  Able to make needs known.  Respiratory:  On room air.  Lung sounds diminished.  Denies shortness of breath at rest.  Declined use of CPAP at HS.  \"Sometimes I use it\"  Cardiac: VSS. Doppler MAP.  ICD.  V Paced.  VVIR   HM3.  All numbers within ordered parameters.  No alarms overnight.  Received 3 mg coumadin.  INR 2.43 (2/13)  No s/s of bleeding  GI: Last BM 2/12.  No report of nausea or vomiting.  : Urinating adequate amounts of clear, yellow urine  Skin:  See PCS for assessment and treatment of wounds and surgical incisions.  LDA:  Double lumen PICC LUE.  All lumens flushed with good blood return  Antibiotics:  Daptomycin IV Q24H  Electrolytes: RN managed K and Mg.  K 3.8 overnight.  Replaced.  AM labs pending.  Pain: Denies  Isolation:  Standard Precautions  Tests/procedures: None performed overnight.  Diet: 2 g Na.  2000 mL fluid restriction  Sleep:  No sleep disturbances noted or reported.  PRN ambien per pt request    P: Continue to monitor Pt status and report changes to Cards 2.  Plan for discharge home today  " 06-Sep-2023 08:41

## 2023-09-06 NOTE — DIETITIAN INITIAL EVALUATION ADULT - PERTINENT LABORATORY DATA
09-05    137  |  103  |  124<H>  ----------------------------<  149<H>  3.5   |  18<L>  |  4.17<H>    Ca    6.1<LL>      05 Sep 2023 06:39    TPro  6.1  /  Alb  2.1<L>  /  TBili  0.5  /  DBili  x   /  AST  10  /  ALT  16  /  AlkPhos  166<H>  09-05

## 2023-09-06 NOTE — PROGRESS NOTE ADULT - PROBLEM SELECTOR PLAN 3
Newly diagnosed congestive heart failure, with severe cardiomyopathy, EF 15-20%. . Resp status stable. Appears comfortable. Cardio following. DNR/DNI.

## 2023-09-06 NOTE — PROGRESS NOTE ADULT - SUBJECTIVE AND OBJECTIVE BOX
follow up on:  complex medical decision making related to goals of care    OVERNIGHT EVENTS:    Present Symptoms:   Dyspnea:   Nausea/Vomiting:   Anxiety:    Depressed Mood:   Fatigue:   Loss of appetite:   Pain:                   location:   Review of Systems: [All others negative or Unable to obtain due to poor mentation]    MEDICATIONS  (STANDING):  calcium gluconate IVPB 1 Gram(s) IV Intermittent once  cefTRIAXone   IVPB 1000 milliGRAM(s) IV Intermittent every 24 hours  fentaNYL   Patch  25 MICROgram(s)/Hr 1 Patch Transdermal every 72 hours  hydrocortisone sodium succinate Injectable 50 milliGRAM(s) IV Push two times a day  melatonin 3 milliGRAM(s) Oral at bedtime  pantoprazole    Tablet 40 milliGRAM(s) Oral before breakfast  phenazopyridine 100 milliGRAM(s) Oral every 8 hours  polyethylene glycol 3350 17 Gram(s) Oral daily  potassium chloride    Tablet ER 40 milliEquivalent(s) Oral every 4 hours  senna 2 Tablet(s) Oral at bedtime    MEDICATIONS  (PRN):  acetaminophen     Tablet .. 1000 milliGRAM(s) Oral every 8 hours PRN Moderate Pain (4 - 6)  calamine/zinc oxide Lotion 1 Application(s) Topical two times a day PRN Rash and/or Itching  lidocaine 2% Jelly 5 milliLiter(s) IntraUrethral every 12 hours PRN Pain at koehler site  oxyCODONE    IR 10 milliGRAM(s) Oral every 6 hours PRN Severe Pain (7 - 10)      PHYSICAL EXAM:  Vital Signs Last 24 Hrs  T(C): 36.3 (06 Sep 2023 09:40), Max: 36.4 (05 Sep 2023 14:54)  T(F): 97.4 (06 Sep 2023 09:40), Max: 97.6 (06 Sep 2023 05:25)  HR: 81 (06 Sep 2023 09:40) (66 - 83)  BP: 111/69 (06 Sep 2023 09:40) (110/66 - 116/73)  BP(mean): 75 (05 Sep 2023 20:02) (72 - 75)  RR: 16 (06 Sep 2023 09:40) (16 - 18)  SpO2: 96% (06 Sep 2023 09:40) (95% - 98%)    Parameters below as of 06 Sep 2023 09:40  Patient On (Oxygen Delivery Method): room air        General: alert  oriented x ____    lethargic distressed cachexia  nonverbal  unarousable verbal  Karnofsky Performance Score/Palliative Performance Status Version2:     %    HEENT: no abnormal lesions dry mouth  ET tube/trach oral lesions:  Lungs: comfortable tachypnea/labored breathing  excessive secretions  CV: RRR, S1S2, tachycardia  GI: soft non distended non tender  incontinent               PEG/NG/OG tube  constipation  last BM:   : incontinent  oliguria/anuria  koehler  Musculoskeletal: weakness  edema   ambulatory with assistance   bedbound/wheelchair bound  Skin: no skin lesions, poor skin turgor, pressure ulcer stage:   Neuro: no deficits, cognitive impairment dsyphagia/dysarthria paresis  Oral intake ability: unable/only mouth care minimal moderate full capability    LABS:                          9.5    19.59 )-----------( 17       ( 06 Sep 2023 07:27 )             28.3     09-06    136  |  101  |  130<H>  ----------------------------<  118<H>  3.0<L>   |  18<L>  |  3.73<H>    Ca    6.3<LL>      06 Sep 2023 07:27    TPro  6.4  /  Alb  2.3<L>  /  TBili  0.5  /  DBili  x   /  AST  12  /  ALT  14  /  AlkPhos  204<H>  09-06    Urinalysis Basic - ( 06 Sep 2023 07:27 )    Color: x / Appearance: x / SG: x / pH: x  Gluc: 118 mg/dL / Ketone: x  / Bili: x / Urobili: x   Blood: x / Protein: x / Nitrite: x   Leuk Esterase: x / RBC: x / WBC x   Sq Epi: x / Non Sq Epi: x / Bacteria: x        RADIOLOGY & ADDITIONAL STUDIES:    ADVANCE DIRECTIVES:   follow up on:  complex medical decision making related to goals of care    OVERNIGHT EVENTS: No events overnight. Pt's wife at the bedside.     Present Symptoms:   Dyspnea: denies  Nausea/Vomiting: denies  Anxiety:  unassessed  Depressed Mood: unassessed  Fatigue: denies  Loss of appetite:   Pain: denies  Review of Systems: [All others negative     MEDICATIONS  (STANDING):  calcium gluconate IVPB 1 Gram(s) IV Intermittent once  cefTRIAXone   IVPB 1000 milliGRAM(s) IV Intermittent every 24 hours  fentaNYL   Patch  25 MICROgram(s)/Hr 1 Patch Transdermal every 72 hours  hydrocortisone sodium succinate Injectable 50 milliGRAM(s) IV Push two times a day  melatonin 3 milliGRAM(s) Oral at bedtime  pantoprazole    Tablet 40 milliGRAM(s) Oral before breakfast  phenazopyridine 100 milliGRAM(s) Oral every 8 hours  polyethylene glycol 3350 17 Gram(s) Oral daily  potassium chloride    Tablet ER 40 milliEquivalent(s) Oral every 4 hours  senna 2 Tablet(s) Oral at bedtime    MEDICATIONS  (PRN):  acetaminophen     Tablet .. 1000 milliGRAM(s) Oral every 8 hours PRN Moderate Pain (4 - 6)  calamine/zinc oxide Lotion 1 Application(s) Topical two times a day PRN Rash and/or Itching  lidocaine 2% Jelly 5 milliLiter(s) IntraUrethral every 12 hours PRN Pain at koehler site  oxyCODONE    IR 10 milliGRAM(s) Oral every 6 hours PRN Severe Pain (7 - 10)      PHYSICAL EXAM:  Vital Signs Last 24 Hrs  T(C): 36.3 (06 Sep 2023 09:40), Max: 36.4 (05 Sep 2023 14:54)  T(F): 97.4 (06 Sep 2023 09:40), Max: 97.6 (06 Sep 2023 05:25)  HR: 81 (06 Sep 2023 09:40) (66 - 83)  BP: 111/69 (06 Sep 2023 09:40) (110/66 - 116/73)  BP(mean): 75 (05 Sep 2023 20:02) (72 - 75)  RR: 16 (06 Sep 2023 09:40) (16 - 18)  SpO2: 96% (06 Sep 2023 09:40) (95% - 98%)    Parameters below as of 06 Sep 2023 09:40  Patient On (Oxygen Delivery Method): room air        General: Pt resting, receiving platelets. NAD  Karnofsky Performance Score/Palliative Performance Status Version2: 30    %    HEENT: no abnormal lesion, clear oropharynx, neck supple  Lungs: unlabored on RA  CV: RRR, S1S2, tachycardia  GI: soft non distended non tender on palpation  : incontinent  koehler  Musculoskeletal: weakness, bedbound no edema  Skin: no skin lesions, poor skin turgor  Neuro: able to follow commands  Oral intake ability: moderate po intake    LABS:                          9.5    19.59 )-----------( 17       ( 06 Sep 2023 07:27 )             28.3     09-06    136  |  101  |  130<H>  ----------------------------<  118<H>  3.0<L>   |  18<L>  |  3.73<H>    Ca    6.3<LL>      06 Sep 2023 07:27    TPro  6.4  /  Alb  2.3<L>  /  TBili  0.5  /  DBili  x   /  AST  12  /  ALT  14  /  AlkPhos  204<H>  09-06    Urinalysis Basic - ( 06 Sep 2023 07:27 )    Color: x / Appearance: x / SG: x / pH: x  Gluc: 118 mg/dL / Ketone: x  / Bili: x / Urobili: x   Blood: x / Protein: x / Nitrite: x   Leuk Esterase: x / RBC: x / WBC x   Sq Epi: x / Non Sq Epi: x / Bacteria: x    < from: CT Cervical Spine No Cont (08.29.23 @ 18:05) >    ACC: [id number] EXAM:  CT CERVICAL SPINE   ORDERED BY: DENNY BRITO     ACC: [id number] EXAM:  CT BRAIN   ORDERED BY: DENNY BRITO     PROCEDURE DATE:  08/29/2023          INTERPRETATION:  CLINICAL INDICATION: Fall with head trauma, prostate   cancer    Brain CT:    5mm axial sections of the brain were obtained from base to vertex,   without the intravenous administration of contrast material. Coronal and   sagittal computer generated reconstructed views are available.    No prior brain imaging is available for comparison.    The fourth, third and lateral ventricles are normal size and position.   There is no hemorrhage, mass or shift of the midline structures. There is   normal gray white matter differentiation. Bone window examination is   remarkable for a foci of increased density within the frontal calvarium   bilaterally suggestive of sclerotic calvarial metastases.    Cervical spine CT:    Serial thin sections on a multi slice scanner were obtained through the   cervical spine from the C1 to the level T2 in a stacked axial fashion   reformatted at 1.25 mm sections with sagittal and coronal computer   generated reconstructed views.    There is normal alignment of the vertebral bodies and facet joints.    The vertebral bodies are normal in height. There are multiple foci of   increased density within the vertebral bodies in the C3, C4, C7 and T1   and T2 vertebral bodies and are consistent with sclerotic metastasis. No   paraspinal masses are identified. Mild degenerative uncal vertebral joint   and facet changes are noted. There is mild reversal the normal   straightening of the normal cervical lordosis. No acute fractures or   dislocations are identified.    IMPRESSION:     Brain CT: No intracranial hemorrhage or mass. Calvarial metastases.    Cervical spine CT: No acute fractures or dislocations. Degenerative   changes. Osseous metastases.    < end of copied text >      RADIOLOGY & ADDITIONAL STUDIES: reviewed    ADVANCE DIRECTIVES: MOLST: DNR/DNI/no feeding tube/trial of IVF/comfort measures only/DNH.

## 2023-09-06 NOTE — PROVIDER CONTACT NOTE (CRITICAL VALUE NOTIFICATION) - PERSON GIVING RESULT:
Amor ( LAB>)
Chinchilla Ed
GÉNESIS Pryor/Laboratory
Anselmo
ENRIQUE Florez
Juan Alberto
OSWALDO Pryor
RAFFI Rey
Mario/Robert

## 2023-09-06 NOTE — PROGRESS NOTE ADULT - SUBJECTIVE AND OBJECTIVE BOX
NEPHROLOGY MEDICAL CARE, [facility] - Dr. Shukri Fair/ Dr. Caterina Gibbons/ Dr. Papa Conley/ Dr. Lissa Rene    Date of Service: 09-06-23 @ 16:35    Patient was seen and examined at bedside.    CC: patient is okay and NAD    Vital Signs Last 24 Hrs  T(C): 36.5 (06 Sep 2023 12:44), Max: 36.5 (06 Sep 2023 12:44)  T(F): 97.7 (06 Sep 2023 12:44), Max: 97.7 (06 Sep 2023 12:44)  HR: 74 (06 Sep 2023 12:44) (66 - 83)  BP: 111/69 (06 Sep 2023 12:44) (110/66 - 119/71)  BP(mean): 75 (05 Sep 2023 20:02) (75 - 75)  RR: 16 (06 Sep 2023 12:44) (16 - 18)  SpO2: 97% (06 Sep 2023 12:44) (95% - 97%)    Parameters below as of 06 Sep 2023 12:44  Patient On (Oxygen Delivery Method): room air        09-05 @ 07:01  -  09-06 @ 07:00  --------------------------------------------------------  IN: 0 mL / OUT: 2800 mL / NET: -2800 mL    09-06 @ 07:01  -  09-06 @ 16:35  --------------------------------------------------------  IN: 0 mL / OUT: 600 mL / NET: -600 mL        PHYSICAL EXAM:  General: No acute respiratory distress.  Eyes: conjunctiva and sclera clear  ENMT: Atraumatic, Normocephalic, supple,   Respiratory: Bilateral poor air entry; No rales, rhonchi, wheezing  Cardiovascular: S1S2+; no m/r/g  Gastrointestinal: Soft, Non-tender, Nondistended; Bowel sounds present  : koehler's cath with clear urine.  Neuro:  Awake, Alert & Oriented X3,   Ext:  trace edema, No Cyanosis  Skin: No visible rashes      MEDICATIONS:  MEDICATIONS  (STANDING):  fentaNYL   Patch  25 MICROgram(s)/Hr 1 Patch Transdermal every 72 hours  melatonin 3 milliGRAM(s) Oral at bedtime  pantoprazole    Tablet 40 milliGRAM(s) Oral before breakfast  phenazopyridine 100 milliGRAM(s) Oral every 8 hours  polyethylene glycol 3350 17 Gram(s) Oral daily  potassium chloride    Tablet ER 40 milliEquivalent(s) Oral every 4 hours  senna 2 Tablet(s) Oral at bedtime    MEDICATIONS  (PRN):  acetaminophen     Tablet .. 1000 milliGRAM(s) Oral every 8 hours PRN Moderate Pain (4 - 6)  calamine/zinc oxide Lotion 1 Application(s) Topical two times a day PRN Rash and/or Itching  lidocaine 2% Jelly 5 milliLiter(s) IntraUrethral every 12 hours PRN Pain at koehler site  oxyCODONE    IR 10 milliGRAM(s) Oral every 6 hours PRN Severe Pain (7 - 10)          LABS:                        9.5    19.59 )-----------( 17       ( 06 Sep 2023 07:27 )             28.3     09-06    136  |  101  |  130<H>  ----------------------------<  118<H>  3.0<L>   |  18<L>  |  3.73<H>    Ca    6.3<LL>      06 Sep 2023 07:27    TPro  6.4  /  Alb  2.3<L>  /  TBili  0.5  /  DBili  x   /  AST  12  /  ALT  14  /  AlkPhos  204<H>  09-06      Urinalysis Basic - ( 06 Sep 2023 07:27 )    Color: x / Appearance: x / SG: x / pH: x  Gluc: 118 mg/dL / Ketone: x  / Bili: x / Urobili: x   Blood: x / Protein: x / Nitrite: x   Leuk Esterase: x / RBC: x / WBC x   Sq Epi: x / Non Sq Epi: x / Bacteria: x        Urine studies    PTH and Vit D:

## 2023-09-06 NOTE — PROGRESS NOTE ADULT - PROBLEM SELECTOR PLAN 8
LTC with hospice  pending placement, palliative following   comfort measures, mews exempt  pain control

## 2023-09-06 NOTE — DIETITIAN INITIAL EVALUATION ADULT - FACTORS AFF FOOD INTAKE
acute on chronic comorbidities including JOSE ANGEL, septic shock, metastatic cancer with chemotherapy/difficulty with food procurement/preparation/persistent lack of appetite/Sikh/ethnic/cultural/personal food preferences

## 2023-09-06 NOTE — PROGRESS NOTE ADULT - PROBLEM SELECTOR PLAN 5
Clinical evidence indicates that the patient has Severe protein calorie malnutrition/ 3rd degree. Albumin 2.3    In context of      Chronic Illness (>1 month)    Energy/Food intake <50% of estimated energy requirement >5 days  Weight loss: Moderate - severe   Body Fat loss: Severe   Cachexia, temporal wasting, ,muscle atrophy  Muscle mass loss: Severe     Strength: weakened severe bedbound    Recommend:   pleasure feeds as tolerated - aspiration precautions, careful hand-feeding, teaching to caregivers  nutritional supplements as tolerated, nutrition consult    No feeding tube

## 2023-09-06 NOTE — PROGRESS NOTE ADULT - PROBLEM SELECTOR PLAN 6
Pt is bedbound. Total care.  High risk for skin failure  Supportive care  OOB to chair as tolerated  Freq positioning      Comfort only

## 2023-09-06 NOTE — PROGRESS NOTE ADULT - ASSESSMENT
64 yo M with pmhx of prostate CA with mets to liver and bone, on Cabazitaxel q3 weeks, presents to the ED for generalized weakness and pain. Follows up with Oncologist Dr. Nasir Gondal. Admitted to ICU for septic shock 2/2  urosepsis vs cardiogenic shock, and febrile neutropenia.    Blood cultures show no growth to date, urine culture revealed e.coli 10,000-49,000 Started on meropenam as of 9/2 but switched to rocephin 1g qd for 7 days to cover uti. ID Dr. Manrique following.    Patient complained of substernal chest pain for a day non radiating. 2x trops were elevated, EKG NSR, repeat troponin shows downgrading trops. PERCY shows EF: 15-20% this likely due chemotherapy side effects. Chest pain is likely due to demand on the heart from severe anemia and it has resolved. Patient is hemodynamically stable.     Also noted with pancytopenia, patient has been afebrile, his leukocyte count has been slowly increasing with filgastrim, received 2 units prbc and 7 units platelets, both still low but increasing.     Patient's JOSE ANGEL is slowly worsening possibly due to volume loss vs low appetite.  GOC has been discussed with family they are leaning towards hospice. Patient is now for LTC with hospice. Palliative and  following. recc: mews exemption and comfort measure only.

## 2023-09-06 NOTE — PROGRESS NOTE ADULT - SUBJECTIVE AND OBJECTIVE BOX
Patient is a 63y old  Male who presents with a chief complaint of Septic Shock 2/2 neutropenic fever (06 Sep 2023       INTERVAL HPI/OVERNIGHT EVENTS: pt seen and examined    MEDICATIONS  (STANDING):  fentaNYL   Patch  25 MICROgram(s)/Hr 1 Patch Transdermal every 72 hours  melatonin 3 milliGRAM(s) Oral at bedtime  pantoprazole    Tablet 40 milliGRAM(s) Oral before breakfast  phenazopyridine 100 milliGRAM(s) Oral every 8 hours  polyethylene glycol 3350 17 Gram(s) Oral daily  senna 2 Tablet(s) Oral at bedtime    MEDICATIONS  (PRN):  acetaminophen     Tablet .. 1000 milliGRAM(s) Oral every 8 hours PRN Moderate Pain (4 - 6)  calamine/zinc oxide Lotion 1 Application(s) Topical two times a day PRN Rash and/or Itching  lidocaine 2% Jelly 5 milliLiter(s) IntraUrethral every 12 hours PRN Pain at koehler site  oxyCODONE    IR 10 milliGRAM(s) Oral every 6 hours PRN Severe Pain (7 - 10)    Vital Signs Last 24 Hrs  T(C): 36.4 (09-06-23 @ 20:10), Max: 36.5 (09-06-23 @ 12:44)  T(F): 97.6 (09-06-23 @ 20:10), Max: 97.7 (09-06-23 @ 12:44)  HR: 89 (09-06-23 @ 20:10) (74 - 89)  BP: 112/66 (09-06-23 @ 20:10) (111/69 - 119/71)  BP(mean): 75 (09-06-23 @ 20:10) (75 - 75)  RR: 18 (09-06-23 @ 20:10) (16 - 18)  SpO2: 95% (09-06-23 @ 20:10) (95% - 97%)          PAST MEDICAL & SURGICAL HISTORY:  CA of prostate      Enlarged prostate with lower urinary tract symptoms (LUTS)      No significant past surgical history          SOCIAL HISTORY  Alcohol:  Tobacco:  Illicit substance use:      FAMILY HISTORY:    LABS:  09-06    136  |  101  |  130<H>  ----------------------------<  118<H>  3.0<L>   |  18<L>  |  3.73<H>    Ca    6.3<LL>      06 Sep 2023 07:27    TPro  6.4  /  Alb  2.3<L>  /  TBili  0.5  /  DBili      /  AST  12  /  ALT  14  /  AlkPhos  204<H>  09-06    Creatinine Trend: 3.73 <--, 4.17 <--, 4.37 <--, 4.24 <--, 3.89 <--, 3.96 <--, 4.00 <--, 4.13 <--                        9.5    19.59 )-----------( 17       ( 06 Sep 2023 07:27 )             28.3     Urine Studies:  Urinalysis Basic - ( 06 Sep 2023 07:27 )    Color:  / Appearance:  / SG:  / pH:   Gluc: 118 mg/dL / Ketone:   / Bili:  / Urobili:    Blood:  / Protein:  / Nitrite:    Leuk Esterase:  / RBC:  / WBC    Sq Epi:  / Non Sq Epi:  / Bacteria:               LIVER FUNCTIONS - ( 06 Sep 2023 07:27 )  Alb: 2.3 g/dL / Pro: 6.4 g/dL / ALK PHOS: 204 U/L / ALT: 14 U/L DA / AST: 12 U/L / GGT: x                 REVIEW OF SYSTEMS:  CONSTITUTIONAL: No fever  EYES: No eye pain  ENMT:  No sinus or throat pain  NECK: No pain or stiffness  RESPIRATORY: No cough, No shortness of breath  CARDIOVASCULAR: No chest pain  GASTROINTESTINAL: No abdominal pain.   GENITOURINARY: No dysuria  NEUROLOGICAL: No headaches  SKIN: No rashes  MUSCULOSKELETAL: No joint pain     RADIOLOGY & ADDITIONAL TESTS:  < from: Xray Chest 1 View- PORTABLE-Urgent (Xray Chest 1 View- PORTABLE-Urgent .) (08.30.23 @ 11:14) >    ACC: [id number] EXAM:  XR CHEST PORTABLE URGENT 1V   ORDERED BY: KAITLIN VASQUEZ     ACC: [id number] EXAM:  XR CHEST PORTABLE IMMED 1V   ORDERED BY: LEEANNE CENTENO     PROCEDURE DATE:  08/30/2023          INTERPRETATION:  AP erect chest on August30, 2020 3:34 AM. Patient has   anemia has Central line.    COMPARISON: None available.    Heart size magnified by technique.    There are small perihilar infiltrates left greater than right.    The right jugular line is been inserted in good position.    Follow-up AP chest on August 30, 2023 at 10:45 AM.    Slight increase in the perihilar infiltrates.    IMPRESSION: Perihilar infiltrate slightly increased on the latest film.   The infiltrates are greater on the left. Right jugular line in place.    --- End of Report ---    < end of copied text >  ACC: [id number] EXAM:  US KIDNEY(S)   ORDERED BY: HIPOLITO ROSALES     ACC: [id number] EXAM:  US URINARY BLADDER   ORDERED BY: LULU COPPOLA     PROCEDURE DATE:  08/31/2023          INTERPRETATION:  CLINICAL INFORMATION: Urinary retention, acute kidney   injury    COMPARISON: CT abdomen pelvis dated 8/29/2023    TECHNIQUE: Sonography of the kidneys and bladder.    FINDINGS:    There is bilateral increased renal cortical echogenicity.    Right kidney measures 12.7 cm in length. No hydronephrosis or shadowing   stone.    Left kidney measures 11.9 cm in length. There is mild hydronephrosis.    Urinary bladder is incompletely collapsed around a Koehler catheter.    IMPRESSION:  Increased renal cortical echogenicity compatible with medical renal   disease.    Mild left hydronephrosis.    Urinary bladder is incompletely collapsed around a Koehler catheter.    --- End of Report ---      ISAIAS KELLEY MD; Attending Radiologist  This document has been electronically signed. Aug 31 2023  2:39PM    Imaging Personally Reviewed:  [x ] YES  [ ] NO    Consultant(s) Notes Reviewed:  [x ] YES  [ ] NO    PHYSICAL EXAM:  GENERAL: NAD  HEAD:  Atraumatic, Normocephalic  EYES:  conjunctiva and sclera clear  ENMT: Moist mucous membranes  NECK: Supple  NERVOUS SYSTEM:  Alert & Oriented X3  CHEST/LUNG: CTA bilaterally  HEART: Regular rate and rhythm  ABDOMEN: Soft, Nontender, Nondistended; Bowel sounds present  EXTREMITIES:  No clubbing, cyanosis, or edema  SKIN: No rashes     Care Collaborated Discussed with Consultants/Other Providers [x ] YES  [ ] NO

## 2023-09-06 NOTE — DIETITIAN INITIAL EVALUATION ADULT - NSFNSGIIOFT_GEN_A_CORE
Adjusted ZMK=153.4  Wt data in EMR: 209 lb 8/30/23; 203.5 lb 9/6/23,   Wts in [facility] EMR reviewed, a bit fluctuated, may due to scale/fluid variance

## 2023-09-06 NOTE — PROGRESS NOTE ADULT - PROBLEM SELECTOR PLAN 5
SCr ~ 4.17  No IV fluids due to EF 15-20%  c/w koehler catheter for comfort care   comfort measures, no lab draws  nephrology Dr. Fair

## 2023-09-06 NOTE — PROGRESS NOTE ADULT - PROBLEM SELECTOR PLAN 2
Hx of prostate cancer w mets to liver and bone, s/p chemotherapy  Follows w Oncologist Dr. Nasir Gondal.

## 2023-09-06 NOTE — PROGRESS NOTE ADULT - SUBJECTIVE AND OBJECTIVE BOX
CARDIOLOGY  *****************    DATE OF SERVICE: 09-06-23    Patient denies chest pain or shortness of breath.   Has some scortal pain Review of symptoms otherwise negative.    acetaminophen     Tablet .. 1000 milliGRAM(s) Oral every 8 hours PRN  calamine/zinc oxide Lotion 1 Application(s) Topical two times a day PRN  fentaNYL   Patch  25 MICROgram(s)/Hr 1 Patch Transdermal every 72 hours  lidocaine 2% Jelly 5 milliLiter(s) IntraUrethral every 12 hours PRN  melatonin 3 milliGRAM(s) Oral at bedtime  oxyCODONE    IR 10 milliGRAM(s) Oral every 6 hours PRN  pantoprazole    Tablet 40 milliGRAM(s) Oral before breakfast  phenazopyridine 100 milliGRAM(s) Oral every 8 hours  polyethylene glycol 3350 17 Gram(s) Oral daily  potassium chloride    Tablet ER 40 milliEquivalent(s) Oral every 4 hours  senna 2 Tablet(s) Oral at bedtime                            9.5    19.59 )-----------( 17       ( 06 Sep 2023 07:27 )             28.3       Hemoglobin: 9.5 g/dL (09-06 @ 07:27)  Hemoglobin: 9.2 g/dL (09-05 @ 06:39)  Hemoglobin: 8.3 g/dL (09-04 @ 15:30)  Hemoglobin: 9.2 g/dL (09-04 @ 07:44)  Hemoglobin: 8.7 g/dL (09-03 @ 10:16)      09-06    136  |  101  |  130<H>  ----------------------------<  118<H>  3.0<L>   |  18<L>  |  3.73<H>    Ca    6.3<LL>      06 Sep 2023 07:27    TPro  6.4  /  Alb  2.3<L>  /  TBili  0.5  /  DBili  x   /  AST  12  /  ALT  14  /  AlkPhos  204<H>  09-06    Creatinine Trend: 3.73<--, 4.17<--, 4.37<--, 4.24<--, 3.89<--, 3.96<--    COAGS:           T(C): 36.5 (09-06-23 @ 12:44), Max: 36.5 (09-06-23 @ 12:44)  HR: 74 (09-06-23 @ 12:44) (66 - 83)  BP: 111/69 (09-06-23 @ 12:44) (110/66 - 119/71)  RR: 16 (09-06-23 @ 12:44) (16 - 18)  SpO2: 97% (09-06-23 @ 12:44) (95% - 98%)  Wt(kg): --    I&O's Summary    05 Sep 2023 07:01  -  06 Sep 2023 07:00  --------------------------------------------------------  IN: 0 mL / OUT: 2800 mL / NET: -2800 mL        HEENT:  (-)icterus (-)pallor  CV: N S1 S2 1/6 CAMRYN (+)2 Pulses B/l  Resp:  Clear to ausculatation B/L, normal effort  GI: (+) BS Soft, NT, ND  Lymph:  (-)Edema, (-)obvious lymphadenopathy  Skin: Warm to touch, Normal turgor  Psych: Appropriate mood and affect        ASSESSMENT/PLAN: Mr Carrasco is a pleasant 63y Male metastatic prostate CA here with neutropenic fever and mixed shock picture: septic + cardiogenic.  He has newly diagnosed congestive heart failure, with severe cardiomyopathy, EF 15-20%, LV nondilated.  He has acute on chronic CKD- his baseline Creat was 1, last year.  This admission, Creat 3.5-4.  Unknown interim values.  ABX per ID   Managing residual vasodilatory state from sepsis with hydrocortisone injections. No longer requiring titratable vasopressors/inotropes.  recommend to start low dose CHF beta blockers (Toprol XL 25mg daily). if bP remains stable  Prior to discharge we may consider starting pre-load/after-load reducing drugs.    Long term - Denosumab can cause CHF.  Erleada and Denosumab can also accelerate/destablize coronary artery disease.  Should adjust his Onc plan accordingly.  Palliative care weighing in on prognosis and symptom management.    Peter Skelton MD, [facility]  BEEPER [phone]

## 2023-09-06 NOTE — PROGRESS NOTE ADULT - SUBJECTIVE AND OBJECTIVE BOX
Source of information: DHAVAL BIRD, Chart review, spouse and daughter  Patient language: English  : n/a    HPI:  This is a 64 yo M with pmhx of prostate CA with mets to liver and bone, on Cabazitaxel q3 weeks, presents to the ED for generalized weakness and pain. Per wife, who is at bedside, states patient had an episode of loss of consciousness and fall, with head trauma. Patient also mentions having dysuria since yesterday. patient started this regimen in march, and this is the first time he has had such symptoms. He follows up with Oncologist Dr. Nasir Gondal. He received his last chemo treatment 1 week ago.  (30 Aug 2023 01:34)    This is a Patient is a 63y old  Male who presents with a chief complaint of Septic Shock 2/2 neutropenic fever (02 Sep 2023 12:23)    Patient admitted to ICU for septic shock 2/2 neutropenic fever and urosepsis. Patient downgraded to the medicine floor on 9/2/23. Urine culture positive for E.coli on Rocephin 1g q 24hrs x 7 days. Pain consulted on 9/2 for chest and throat pain. Pt seen and examined at bedside this morning, wife present. Today, patient reports suprapubic pain 6/10 SCALE USED: (1-10 VNRS). Patient describes pain as localized to the suprapubic region constant and sharp in quality. The pain is alleviated by pain medications and exacerbated by movement and worse post void. Pt is tolerating PO diet. Pt denies SOB, nausea, vomiting and constipation. Last BM 9/6, patient had episode of bowel incontinence during assessment (dark) in color. Patient stated goal for pain control: to be able to take deep breaths, get out of bed to chair and ambulate with tolerable pain control. Per ISTOP pt was on Fentanyl patch 75 mcg/hr at home and Percocet.      PAST MEDICAL & SURGICAL HISTORY:    CA of prostate    Enlarged prostate with lower urinary tract symptoms (LUTS)    No significant past surgical history    FAMILY HISTORY:    Family history of hypertension (Mother)    Social History:   [X]Denies ETOH use, illicit drug use, and smoking     Allergies    No Known Allergies    MEDICATIONS  (STANDING):  fentaNYL   Patch  25 MICROgram(s)/Hr 1 Patch Transdermal every 72 hours  melatonin 3 milliGRAM(s) Oral at bedtime  pantoprazole    Tablet 40 milliGRAM(s) Oral before breakfast  phenazopyridine 100 milliGRAM(s) Oral every 8 hours  polyethylene glycol 3350 17 Gram(s) Oral daily  potassium chloride    Tablet ER 40 milliEquivalent(s) Oral every 4 hours  senna 2 Tablet(s) Oral at bedtime    MEDICATIONS  (PRN):  acetaminophen     Tablet .. 1000 milliGRAM(s) Oral every 8 hours PRN Moderate Pain (4 - 6)  calamine/zinc oxide Lotion 1 Application(s) Topical two times a day PRN Rash and/or Itching  lidocaine 2% Jelly 5 milliLiter(s) IntraUrethral every 12 hours PRN Pain at koehler site  oxyCODONE    IR 10 milliGRAM(s) Oral every 6 hours PRN Severe Pain (7 - 10)    Vital Signs Last 24 Hrs  T(C): 36.5 (06 Sep 2023 12:44), Max: 36.5 (06 Sep 2023 12:44)  T(F): 97.7 (06 Sep 2023 12:44), Max: 97.7 (06 Sep 2023 12:44)  HR: 74 (06 Sep 2023 12:44) (66 - 83)  BP: 111/69 (06 Sep 2023 12:44) (110/66 - 119/71)  BP(mean): 75 (05 Sep 2023 20:02) (72 - 75)  RR: 16 (06 Sep 2023 12:44) (16 - 18)  SpO2: 97% (06 Sep 2023 12:44) (95% - 98%)    Parameters below as of 06 Sep 2023 12:44  Patient On (Oxygen Delivery Method): room air    SARS-CoV-2: NotDete (30 Aug 2023 09:30)    LABS: Reviewed                          9.5    19.59 )-----------( 17       ( 06 Sep 2023 07:27 )             28.3     09-06    136  |  101  |  130<H>  ----------------------------<  118<H>  3.0<L>   |  18<L>  |  3.73<H>    Ca    6.3<LL>      06 Sep 2023 07:27    TPro  6.4  /  Alb  2.3<L>  /  TBili  0.5  /  DBili  x   /  AST  12  /  ALT  14  /  AlkPhos  204<H>  09-06    LIVER FUNCTIONS - ( 06 Sep 2023 07:27 )  Alb: 2.3 g/dL / Pro: 6.4 g/dL / ALK PHOS: 204 U/L / ALT: 14 U/L DA / AST: 12 U/L / GGT: x           Urinalysis Basic - ( 06 Sep 2023 07:27 )    Color: x / Appearance: x / SG: x / pH: x  Gluc: 118 mg/dL / Ketone: x  / Bili: x / Urobili: x   Blood: x / Protein: x / Nitrite: x   Leuk Esterase: x / RBC: x / WBC x   Sq Epi: x / Non Sq Epi: x / Bacteria: x    CAPILLARY BLOOD GLUCOSE    SARS-CoV-2: NotDetec (30 Aug 2023 09:30)    Radiology: Reviewed    < from: US Renal (08.31.23 @ 14:16) >    ACC: [id number] EXAM:  US KIDNEY(S)   ORDERED BY: HIPOLITO ROSALES     ACC: [id number] EXAM:  US URINARY BLADDER   ORDERED BY: LULU COPPOLA     PROCEDURE DATE:  08/31/2023      INTERPRETATION:  CLINICAL INFORMATION: Urinary retention, acute kidney   injury    COMPARISON: CT abdomen pelvis dated 8/29/2023    TECHNIQUE: Sonography of the kidneys and bladder.    FINDINGS:    There is bilateral increased renal cortical echogenicity.    Right kidney measures 12.7 cm in length. No hydronephrosis or shadowing   stone.    Left kidney measures 11.9 cm in length. There is mild hydronephrosis.    Urinary bladder is incompletely collapsed around a Koehler catheter.    IMPRESSION:  Increased renal cortical echogenicity compatible with medical renal   disease.    Mild left hydronephrosis.    Urinary bladder is incompletely collapsed around a Koehler catheter.    --- End of Report ---    ISAIAS KELLEY MD; Attending Radiologist  This document has been electronically signed. Aug 31 2023  2:39PM    < end of copied text >    < from: CT Cervical Spine No Cont (08.29.23 @ 18:05) >    ACC: [id number] EXAM:  CT CERVICAL SPINE   ORDERED BY: DENNY BRITO   ACC: [id number] EXAM:  CT BRAIN   ORDERED BY: DENNY BRITO     PROCEDURE DATE:  08/29/2023      INTERPRETATION:  CLINICAL INDICATION: Fall with head trauma, prostate   cancer    Brain CT:    5mm axial sections of the brain were obtained from base to vertex,   without the intravenous administration of contrast material. Coronal and   sagittal computer generated reconstructed views are available.    No prior brain imaging is available for comparison.    The fourth, third and lateral ventricles are normal size and position.   There is no hemorrhage, mass or shift of the midline structures. There is   normal gray white matter differentiation. Bone window examination is   remarkable for a foci of increased density within the frontal calvarium   bilaterally suggestive of sclerotic calvarial metastases.    Cervical spine CT:    Serial thin sections on a multi slice scanner were obtained through the   cervical spine from the C1 to the level T2 in a stacked axial fashion   reformatted at 1.25 mm sections with sagittal and coronal computer   generated reconstructed views.    There is normal alignment of the vertebral bodies and facet joints.    The vertebral bodies are normal in height. There are multiple foci of   increased density within the vertebral bodies in the C3, C4, C7 and T1   and T2 vertebral bodies and are consistent with sclerotic metastasis. No   paraspinal masses are identified. Mild degenerative uncal vertebral joint   and facet changes are noted. There is mild reversal the normal   straightening of the normal cervical lordosis. No acute fractures or   dislocations are identified.    IMPRESSION:     Brain CT: No intracranial hemorrhage or mass. Calvarial metastases.    Cervical spine CT: No acute fractures or dislocations. Degenerative   changes. Osseous metastases.    --- End of Report ---      KIRT PAK MD; Attending Radiologist  This document has been electronically signed. Aug29 2023  6:10PM    < end of copied text >      ORT Score -   Family Hx of substance abuse	Female	      Male  Alcohol 	                                           1                     3  Illegal drugs	                                   2                     3  Rx drugs                                           4 	                  4  Personal Hx of substance abuse		  Alcohol 	                                          3	                  3  Illegal drugs                                     4	                  4  Rx drugs                                            5 	                  5  Age between 16- 45 years	           1                     1  hx preadolescent sexual abuse	   3 	                  0  Psychological disease		  ADD, OCD, bipolar, schizophrenia   2	          2  Depression                                           1 	          1  Total: 0    a score of 3 or lower indicates low risk for opioid abuse		  a score of 4-7 indicates moderate risk for opioid abuse		  a score of 8 or higher indicates high risk for opioid abuse    REVIEW OF SYSTEMS:  CONSTITUTIONAL: No fever or fatigue  HEENT:  No difficulty hearing, no change in vision  NECK: No pain or stiffness  RESPIRATORY: + cough, no shortness of breath  CARDIOVASCULAR: denies chest pain, no palpitations, dizziness  GASTROINTESTINAL: + loss of appetite,  No abdominal or epigastric pain. No nausea, vomiting; + loose stool (9/6)  GENITOURINARY: + suprapubic pain, koehler catheter intact  MUSCULOSKELETAL: No joint pain or swelling; No muscle, back, or extremity pain, no upper or lower motor strength weakness, no saddle anesthesia, + episode of bowel incontinence (9/6), no falls   NEURO: No headaches, No numbness/tingling b/l LE, No weakness    PHYSICAL EXAM:  GENERAL:  Alert & Oriented X4, cooperative, NAD, Good concentration. Speech is clear.   RESPIRATORY: Respirations even and unlabored. Clear to auscultation bilaterally; No rales, rhonchi, wheezing, or rubs  CARDIOVASCULAR: Normal S1/S2, regular rate and rhythm; No murmurs, rubs, or gallops. No JVD.   GASTROINTESTINAL:  Soft, Nontender, Nondistended; Bowel sounds present  GENITOURINARY: + suprapubic pain with palpation, koehler catheter in place draining blood tinged urine  PERIPHERAL VASCULAR:  Extremities warm without edema. 2+ Peripheral Pulses, No cyanosis, No calf tenderness  MUSCULOSKELETAL: Motor Strength 5/5 B/L upper and lower extremities; moves all extremities equally against gravity; ROM intact; negative SLR; No tenderness on palpation of all joints.   SKIN: Warm, dry, intact. No rashes, lesions, scars or wounds.     Risk factors associated with adverse outcomes related to opioid treatment  [ ] Concurrent benzodiazepine use  [ ] History/ Active substance use or alcohol use disorder  [ ] Psychiatric co-morbidity  [ ] Sleep apnea  [ ] COPD  [ ] BMI> 35  [ ] Liver dysfunction  [X] Renal dysfunction  [ ] CHF  [ ] Smoker  [X]  Age > 60 years    [X]  [place]  Reviewed and Copied to Chart. See below.    Plan of care and goal oriented pain management treatment options were discussed with patient and /or primary care giver; all questions and concerns were addressed and care was aligned with patient's wishes.    Educated patient on goal oriented pain management treatment options     09-06-23 @ 13:13

## 2023-09-06 NOTE — PROVIDER CONTACT NOTE (CRITICAL VALUE NOTIFICATION) - SITUATION
Pt has critical lab of platelet: 17 however, diagnosed with pancytopenia. ABHISHEK Tello made aware.
Pt with critical lab of Calcium 6.3

## 2023-09-06 NOTE — DIETITIAN NUTRITION RISK NOTIFICATION - ADDITIONAL COMMENTS/DIETITIAN RECOMMENDATIONS
Malnutrition Diagnosis Parameters: poor intake x 6m, 2+ generalized edema, Severe Malnutrition per MD, debility appropriate

## 2023-09-06 NOTE — DIETITIAN INITIAL EVALUATION ADULT - OTHER INFO
Pt lives home with family PTA, downgraded from ICU, alert, very weak, wife at bedside providing all information; Reported decreased intake x 6m since Chemotherapy, on Ensure 1 can/d at home, fluctuated wts, denied GI distress, chewing or swallowing problem at present; Unknown food allergies, food choices obtained and forwarded to Dietary, willing to start oral nutritional supplement; s/p Palliative consult, Severe Malnutrition per MD, pending LTC with hospice per team

## 2023-09-06 NOTE — PROGRESS NOTE ADULT - PROBLEM SELECTOR PLAN 2
Mild pain   - Non-pharmacological pain treatment recommendations  - Warm/ Cool packs PRN   - Repositioning extremity, elevation, imagery, relaxation, distraction.  - Physical therapy OOB if no contraindications   Recommendations discussed with primary team and RN.    **Palliative Team following, pain management will sign off. Please reconsult if needed**

## 2023-09-06 NOTE — DIETITIAN INITIAL EVALUATION ADULT - ADD RECOMMEND
Severe Malnutrition identified; Pending LTC/hospice per MD; Least restricted diet due to medical conditions and quality of life

## 2023-09-06 NOTE — PROGRESS NOTE ADULT - ASSESSMENT
62 yo M with pmhx of prostate CA with mets to liver and bone, on Cabazitaxel q3 weeks, presents to the ED for generalized weakness and pain. Follows up with Oncologist Dr. Nasir Gondal. Admitted to ICU for septic shock 2/2  urosepsis vs cardiogenic shock, and febrile neutropenia.    Blood cultures show no growth to date, urine culture revealed e.coli 10,000-49,000 Started on meropenam as of 9/2 but switched to rocephin 1g qd for 7 days to cover uti. ID Dr. Manrique following.    Patient complained of substernal chest pain for a day non radiating. 2x trops were elevated, EKG NSR, repeat troponin shows downgrading trops. PERCY shows EF: 15-20% this likely due chemotherapy side effects. Chest pain is likely due to demand on the heart from severe anemia and it has resolved. Patient is hemodynamically stable.     Also noted with pancytopenia, patient has been afebrile, his leukocyte count has been slowly increasing with filgastrim, received 2 units prbc and 7 units platelets, both still low but increasing.     Patient's JOSE ANGEL is slowly worsening possibly due to volume loss vs low appetite.  GOC has been discussed with family they are leaning towards hospice. Patient is now for LTC with hospice. Palliative and  following. recc: mews exemption and comfort measure only.

## 2023-09-06 NOTE — PROGRESS NOTE ADULT - ASSESSMENT
Confidential Drug Utilization Report  Search Terms: ESTEBAN BIRD, 1960 Search Date: 09/02/2023 12:22:58 PM  The Drug Utilization Report below displays all of the controlled substance prescriptions, if any, that your patient has filled in the last twelve months. The information displayed on this report is compiled from pharmacy submissions to the Department, and accurately reflects the information as submitted by the pharmacies.    This report was requested by: Eveline Rose | Reference #: [id number]    You have not added a MARIA INES number. Keeping your MARIA INES number(s) up to date on the My MARIA INES # tab will enable the separation of your prescriptions from others in the search results.    Practitioner Count: 0  Pharmacy Count: 0  Current Opioid Prescriptions: 0  Current Benzodiazepine Prescriptions: 0  Current Stimulant Prescriptions: 0      Patient Demographic Information (PDI)       PDI	First Name	Last Name	Birth Date	Gender	Street Address	[facility] Code  A	Esteban Bird	1960	Male	[address]	[place]	[address]    Prescription Information      PDI Filter:    PDI	Current Rx	Drug Type	Rx Written	Rx Dispensed	Drug	Quantity	Days Supply	Prescriber Name	Prescriber MARIA INES #	Payment Method	Dispenser  A	N	O	03/10/2023	03/28/2023	fentanyl 75 mcg/hr patch	10	30	Dick Jack	[id number]	Insurance	[id number]  A	N	O	02/13/2023	02/13/2023	oxycodone-acetaminophen  mg tab	120	30	GondalJuancho MD	[id number]	Insurance	[id number]  A	N	O	11/21/2022	11/21/2022	tramadol hcl 50 mg tablet	20	6	GondalJuancho MD	[id number]	Insurance	[id number]  A	N	O	10/31/2022	10/31/2022	oxycodone-acetaminophen  mg tab	120	30	GondalJuancho MD	[id number]	Insurance	[id number]  A	N	O	09/06/2022	09/06/2022	oxycodone-acetaminophen  mg tab	120	30	GondalJuancho MD	[id number]	Insurance	[id number]    * - Details of Drug Type : O = Opioid, B = Benzodiazepine, S = Stimulant    * - Drugs marked with an asterisk are compound drugs. If the compound drug is made up of more than one controlled substance, then each controlled substance will be a separate row in the table.

## 2023-09-06 NOTE — PROGRESS NOTE ADULT - PROBLEM SELECTOR PLAN 4
JOSE ANGEL due to ATN from septic shock. Nephrology following   CT Scan shows left mild hydroureteronephrosis with collapsed bladder    Avoid NSAIDs

## 2023-09-06 NOTE — PROGRESS NOTE ADULT - ASSESSMENT
1. JOSE ANGEL due to ATN from septic shock   CT Scan shows left mild hydroureteronephrosis with collapsed bladder.  -Scr improved to 3.7mg/dL with increased UO. possible renal recovery.  -FeNa >1%. spot protein to creatinine ratio elevated; rpt after infection resolved.  -Adjust meds to eGFR and avoid IV Gadolinium contrast, NSAIDs, and phosphate enema.  -Monitor I/O's daily.   -Monitor SMA daily.  2. Hypotension due to septic shock  - bp is stable. off pressor  -monitor BP.  3. Hyponatremia possible multifactorial due to hypovolemia and high ADH state with lack of free water excretion.  -Na stable 137.  -urine lytes noted.   -Check Seurm Na daily. Monitor I/O's daily. Avoid overcorrection of NA (8-10meq/day)  4. HAGMA due to Lactic Acidosis  -abg noted  -CO2 is stable.  -monitor CO2 and abg.     d/w pt and wife at bedside.

## 2023-09-06 NOTE — PROVIDER CONTACT NOTE (CRITICAL VALUE NOTIFICATION) - TEST AND RESULT REPORTED:
calcium 6.1
Platelet 8.0
WBC: 0.18 Plt: 9
Calcium:  6.3
Calcium 5.9
Lactate 5.2
calcium 6.3
platelet 7
Platelet:  17

## 2023-09-06 NOTE — PROGRESS NOTE ADULT - PROBLEM SELECTOR PLAN 1
Pt reports no pain at the time of exam    -c/w Fentanyl 12.5mcg q72  -c/w oxycodone 10 mg PO prn for breakthrough  -Bowel regimen.  -Comfort measures only  -DNR/DNI

## 2023-09-06 NOTE — PROGRESS NOTE ADULT - SUBJECTIVE AND OBJECTIVE BOX
Patient is a 63y old  Male who presents with a chief complaint of Septic Shock 2/2 neutropenic fever (06 Sep 2023 13:12)      INTERVAL HPI/OVERNIGHT EVENTS: no overnight events    I&O's Summary    05 Sep 2023 07:01  -  06 Sep 2023 07:00  --------------------------------------------------------  IN: 0 mL / OUT: 2800 mL / NET: -2800 mL      Vital Signs Last 24 Hrs  T(C): 36.5 (06 Sep 2023 12:44), Max: 36.5 (06 Sep 2023 12:44)  T(F): 97.7 (06 Sep 2023 12:44), Max: 97.7 (06 Sep 2023 12:44)  HR: 74 (06 Sep 2023 12:44) (66 - 83)  BP: 111/69 (06 Sep 2023 12:44) (110/66 - 119/71)  BP(mean): 75 (05 Sep 2023 20:02) (72 - 75)  RR: 16 (06 Sep 2023 12:44) (16 - 18)  SpO2: 97% (06 Sep 2023 12:44) (95% - 98%)    Parameters below as of 06 Sep 2023 12:44  Patient On (Oxygen Delivery Method): room air      PAST MEDICAL & SURGICAL HISTORY:  CA of prostate      Enlarged prostate with lower urinary tract symptoms (LUTS)      No significant past surgical history          SOCIAL HISTORY  Alcohol:  Tobacco:  Illicit substance use:      FAMILY HISTORY:      LABS:                        9.5    19.59 )-----------( 17       ( 06 Sep 2023 07:27 )             28.3     09-06    136  |  101  |  130<H>  ----------------------------<  118<H>  3.0<L>   |  18<L>  |  3.73<H>    Ca    6.3<LL>      06 Sep 2023 07:27    TPro  6.4  /  Alb  2.3<L>  /  TBili  0.5  /  DBili  x   /  AST  12  /  ALT  14  /  AlkPhos  204<H>  09-06      Urinalysis Basic - ( 06 Sep 2023 07:27 )    Color: x / Appearance: x / SG: x / pH: x  Gluc: 118 mg/dL / Ketone: x  / Bili: x / Urobili: x   Blood: x / Protein: x / Nitrite: x   Leuk Esterase: x / RBC: x / WBC x   Sq Epi: x / Non Sq Epi: x / Bacteria: x      CAPILLARY BLOOD GLUCOSE        Urinalysis Basic - ( 06 Sep 2023 07:27 )    Color: x / Appearance: x / SG: x / pH: x  Gluc: 118 mg/dL / Ketone: x  / Bili: x / Urobili: x   Blood: x / Protein: x / Nitrite: x   Leuk Esterase: x / RBC: x / WBC x   Sq Epi: x / Non Sq Epi: x / Bacteria: x        MEDICATIONS  (STANDING):  fentaNYL   Patch  25 MICROgram(s)/Hr 1 Patch Transdermal every 72 hours  melatonin 3 milliGRAM(s) Oral at bedtime  pantoprazole    Tablet 40 milliGRAM(s) Oral before breakfast  phenazopyridine 100 milliGRAM(s) Oral every 8 hours  polyethylene glycol 3350 17 Gram(s) Oral daily  potassium chloride    Tablet ER 40 milliEquivalent(s) Oral every 4 hours  senna 2 Tablet(s) Oral at bedtime    MEDICATIONS  (PRN):  acetaminophen     Tablet .. 1000 milliGRAM(s) Oral every 8 hours PRN Moderate Pain (4 - 6)  calamine/zinc oxide Lotion 1 Application(s) Topical two times a day PRN Rash and/or Itching  lidocaine 2% Jelly 5 milliLiter(s) IntraUrethral every 12 hours PRN Pain at koehler site  oxyCODONE    IR 10 milliGRAM(s) Oral every 6 hours PRN Severe Pain (7 - 10)      REVIEW OF SYSTEMS:  CONSTITUTIONAL: No fever  EYES: No eye pain  ENMT:  No sinus or throat pain  NECK: No pain or stiffness  RESPIRATORY: No cough, No shortness of breath  CARDIOVASCULAR: No chest pain  GASTROINTESTINAL: No abdominal pain.   GENITOURINARY: No dysuria  NEUROLOGICAL: No headaches  SKIN: No rashes  MUSCULOSKELETAL: No joint pain     RADIOLOGY & ADDITIONAL TESTS:  < from: Xray Chest 1 View- PORTABLE-Urgent (Xray Chest 1 View- PORTABLE-Urgent .) (08.30.23 @ 11:14) >    ACC: [id number] EXAM:  XR CHEST PORTABLE URGENT 1V   ORDERED BY: KAITLIN VASQUEZ     ACC: [id number] EXAM:  XR CHEST PORTABLE IMMED 1V   ORDERED BY: LEEANNE CENTENO     PROCEDURE DATE:  08/30/2023          INTERPRETATION:  AP erect chest on August30, 2020 3:34 AM. Patient has   anemia has Central line.    COMPARISON: None available.    Heart size magnified by technique.    There are small perihilar infiltrates left greater than right.    The right jugular line is been inserted in good position.    Follow-up AP chest on August 30, 2023 at 10:45 AM.    Slight increase in the perihilar infiltrates.    IMPRESSION: Perihilar infiltrate slightly increased on the latest film.   The infiltrates are greater on the left. Right jugular line in place.    --- End of Report ---    < end of copied text >  ACC: [id number] EXAM:  US KIDNEY(S)   ORDERED BY: HIPOLITO ROSALES     ACC: [id number] EXAM:  US URINARY BLADDER   ORDERED BY: LULU COPPOLA     PROCEDURE DATE:  08/31/2023          INTERPRETATION:  CLINICAL INFORMATION: Urinary retention, acute kidney   injury    COMPARISON: CT abdomen pelvis dated 8/29/2023    TECHNIQUE: Sonography of the kidneys and bladder.    FINDINGS:    There is bilateral increased renal cortical echogenicity.    Right kidney measures 12.7 cm in length. No hydronephrosis or shadowing   stone.    Left kidney measures 11.9 cm in length. There is mild hydronephrosis.    Urinary bladder is incompletely collapsed around a Koehler catheter.    IMPRESSION:  Increased renal cortical echogenicity compatible with medical renal   disease.    Mild left hydronephrosis.    Urinary bladder is incompletely collapsed around a Koehler catheter.    --- End of Report ---      ISAIAS KELLEY MD; Attending Radiologist  This document has been electronically signed. Aug 31 2023  2:39PM    Imaging Personally Reviewed:  [x ] YES  [ ] NO    Consultant(s) Notes Reviewed:  [x ] YES  [ ] NO    PHYSICAL EXAM:  GENERAL: NAD  HEAD:  Atraumatic, Normocephalic  EYES:  conjunctiva and sclera clear  ENMT: Moist mucous membranes  NECK: Supple  NERVOUS SYSTEM:  Alert & Oriented X3  CHEST/LUNG: CTA bilaterally  HEART: Regular rate and rhythm  ABDOMEN: Soft, Nontender, Nondistended; Bowel sounds present  EXTREMITIES:  No clubbing, cyanosis, or edema  SKIN: No rashes     Care Collaborated Discussed with Consultants/Other Providers [x ] YES  [ ] NO

## 2023-09-06 NOTE — PROGRESS NOTE ADULT - PROBLEM SELECTOR PLAN 2
p/w tachycardia, fever, low blood pressure  urine culture revealed e.coli 10,000-49,000   blood cultures NGTD   s/p meropenem switched to Rocephin   pt now comfort measures and mews exempt  pending LTC w/ hospice  ID Dr. Manrique following

## 2023-09-06 NOTE — PROGRESS NOTE ADULT - CONVERSATION DETAILS
Met with the pt at the bedside, he deferred all conversations to his wife. Discussed his clinical condition and overall poor prognosis. Confirmed w his wife, the plan if for LTC w hospice.  Explained that hospice care primary focus is treating the pt and the symptoms of his disease/comfort measures only. No more chemo drugs, no more scans, no more blood work (hospice does draw blood if there is a need to see what may be causing discomfort.) She agreed to stop all lab draws and focus on symptom management only.    New MOLST drafted: DNR/DNI/no feeding tube/trial of IVF/comfort measures only/DNH. No labs.  All questions answered. Support provided.  d/w primary team

## 2023-09-06 NOTE — DIETITIAN INITIAL EVALUATION ADULT - PERTINENT MEDS FT
MEDICATIONS  (STANDING):  cefTRIAXone   IVPB 1000 milliGRAM(s) IV Intermittent every 24 hours  fentaNYL   Patch  25 MICROgram(s)/Hr 1 Patch Transdermal every 72 hours  hydrocortisone sodium succinate Injectable 50 milliGRAM(s) IV Push two times a day  melatonin 3 milliGRAM(s) Oral at bedtime  pantoprazole    Tablet 40 milliGRAM(s) Oral before breakfast  phenazopyridine 100 milliGRAM(s) Oral every 8 hours  polyethylene glycol 3350 17 Gram(s) Oral daily  senna 2 Tablet(s) Oral at bedtime    MEDICATIONS  (PRN):  acetaminophen     Tablet .. 1000 milliGRAM(s) Oral every 8 hours PRN Moderate Pain (4 - 6)  calamine/zinc oxide Lotion 1 Application(s) Topical two times a day PRN Rash and/or Itching  lidocaine 2% Jelly 5 milliLiter(s) IntraUrethral every 12 hours PRN Pain at koehler site  oxyCODONE    IR 10 milliGRAM(s) Oral every 6 hours PRN Severe Pain (7 - 10)

## 2023-09-06 NOTE — PROGRESS NOTE ADULT - PROBLEM SELECTOR PLAN 1
likely secondary to Cabazitaxel  Continue supportive care  Hgb stable, no active bleeding noted  pain management following   palliative following: comfort measures and mews exempt  pending LTC with hospice  [facility]MA following

## 2023-09-06 NOTE — PROGRESS NOTE ADULT - PROBLEM SELECTOR PLAN 1
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 Patient presented to the ED after a witnessed episode of loss of consciousness and fall, with head trauma. CT head demonstrated no intracranial hemorrhage or mass. Calvarial metastases. US Kidney and Bladder demonstrate increased renal cortical echogenicity compatible with medical renal disease. Mild left hydronephrosis. Urine culture positive for E.coli on Rocephin 1g q 24hrs x 7 days.  Patient follows oncologist Dr. Nasir Gondal as outpatient. Per [facility]STOP pt was previously on Fentanyl patch 75 mcg/hr and Percocet.    Opioid pain recommendations:  - Continue Fentanyl patch 25 mcg/hr. Monitor for sedation/ respiratory depression.   - Discontinued IV Dilaudid on 9/5  - Continue oxycodone 10mg PO q6h PRN severe pain. Monitor for sedation/ respiratory depression.   Non-opioid pain recommendations   - Avoid NSAIDS due to JOSE ANGEL, Cr. 4.17  - Start Acetaminophen 1g q 8hrs PRN for moderate pain.   Bowel Regimen Hold for loose stool

## 2023-09-07 LAB — SARS-COV-2 RNA SPEC QL NAA+PROBE: SIGNIFICANT CHANGE UP

## 2023-09-07 RX ADMIN — OXYCODONE HYDROCHLORIDE 10 MILLIGRAM(S): 5 TABLET ORAL at 15:04

## 2023-09-07 RX ADMIN — Medication 100 MILLIGRAM(S): at 22:36

## 2023-09-07 RX ADMIN — OXYCODONE HYDROCHLORIDE 10 MILLIGRAM(S): 5 TABLET ORAL at 15:34

## 2023-09-07 RX ADMIN — PANTOPRAZOLE SODIUM 40 MILLIGRAM(S): 20 TABLET, DELAYED RELEASE ORAL at 06:38

## 2023-09-07 RX ADMIN — Medication 100 MILLIGRAM(S): at 14:20

## 2023-09-07 RX ADMIN — Medication 100 MILLIGRAM(S): at 06:38

## 2023-09-07 RX ADMIN — Medication 3 MILLIGRAM(S): at 22:00

## 2023-09-07 RX ADMIN — SENNA PLUS 2 TABLET(S): 8.6 TABLET ORAL at 22:02

## 2023-09-07 NOTE — PROGRESS NOTE ADULT - SUBJECTIVE AND OBJECTIVE BOX
NEPHROLOGY MEDICAL CARE, [facility] - Dr. Shukri Fair/ Dr. Caterina Gibbons/ Dr. Papa Conley/ Dr. Lissa Rene    Date of Service: 09-07-23 @ 14:22    Patient was seen and examined at bedside.    CC: wife at bedside; pt has penile pain.     Vital Signs Last 24 Hrs  T(C): 36.8 (07 Sep 2023 13:50), Max: 36.8 (07 Sep 2023 13:50)  T(F): 98.3 (07 Sep 2023 13:50), Max: 98.3 (07 Sep 2023 13:50)  HR: 102 (07 Sep 2023 13:50) (89 - 102)  BP: 106/68 (07 Sep 2023 13:50) (100/64 - 112/66)  BP(mean): 76 (07 Sep 2023 13:50) (75 - 76)  RR: 18 (07 Sep 2023 13:50) (18 - 18)  SpO2: 100% (07 Sep 2023 13:50) (95% - 100%)    Parameters below as of 07 Sep 2023 13:50  Patient On (Oxygen Delivery Method): room air        09-06 @ 07:01  -  09-07 @ 07:00  --------------------------------------------------------  IN: 0 mL / OUT: 1850 mL / NET: -1850 mL        PHYSICAL EXAM:  General: No acute respiratory distress.  Eyes: conjunctiva and sclera clear  ENMT: Atraumatic, Normocephalic, supple,   Respiratory: Bilateral poor air entry; No rales, rhonchi, wheezing  Cardiovascular: S1S2+; no m/r/g  Gastrointestinal: Soft, Non-tender, Nondistended; Bowel sounds present  : koehler's cath with clear urine.  Neuro:  Awake, Alert & Oriented X3,   Ext:  trace edema, No Cyanosis  Skin: No visible rashes      MEDICATIONS:  MEDICATIONS  (STANDING):  fentaNYL   Patch  25 MICROgram(s)/Hr 1 Patch Transdermal every 72 hours  melatonin 3 milliGRAM(s) Oral at bedtime  pantoprazole    Tablet 40 milliGRAM(s) Oral before breakfast  phenazopyridine 100 milliGRAM(s) Oral every 8 hours  polyethylene glycol 3350 17 Gram(s) Oral daily  senna 2 Tablet(s) Oral at bedtime    MEDICATIONS  (PRN):  acetaminophen     Tablet .. 1000 milliGRAM(s) Oral every 8 hours PRN Moderate Pain (4 - 6)  calamine/zinc oxide Lotion 1 Application(s) Topical two times a day PRN Rash and/or Itching  lidocaine 2% Jelly 5 milliLiter(s) IntraUrethral every 12 hours PRN Pain at koehler site  oxyCODONE    IR 10 milliGRAM(s) Oral every 6 hours PRN Severe Pain (7 - 10)          LABS:                        9.5    19.59 )-----------( 17       ( 06 Sep 2023 07:27 )             28.3     09-06    136  |  101  |  130<H>  ----------------------------<  118<H>  3.0<L>   |  18<L>  |  3.73<H>    Ca    6.3<LL>      06 Sep 2023 07:27    TPro  6.4  /  Alb  2.3<L>  /  TBili  0.5  /  DBili  x   /  AST  12  /  ALT  14  /  AlkPhos  204<H>  09-06      Urinalysis Basic - ( 06 Sep 2023 07:27 )    Color: x / Appearance: x / SG: x / pH: x  Gluc: 118 mg/dL / Ketone: x  / Bili: x / Urobili: x   Blood: x / Protein: x / Nitrite: x   Leuk Esterase: x / RBC: x / WBC x   Sq Epi: x / Non Sq Epi: x / Bacteria: x        Urine studies    PTH and Vit D:

## 2023-09-07 NOTE — PROGRESS NOTE ADULT - PROBLEM SELECTOR PLAN 3
congestive heart failure, with severe cardiomyopathy, EF 15-20%, LV nondilated  avoid IV fluids  cardiology Dr. Skelton following  comfort measure only

## 2023-09-07 NOTE — PROGRESS NOTE ADULT - PROBLEM SELECTOR PLAN 5
SCr ~ 4.17--> 3.73  No IV fluids due to EF 15-20%  comfort measures, no lab draws  nephrology Dr. Marv HARP koehler catheter for TOV, as pt complaints penile/pelvic pain per nephrology.

## 2023-09-07 NOTE — PROGRESS NOTE ADULT - PROBLEM SELECTOR PLAN 8
LTC with hospice, SW following, f/u TB QuantiFeron test as requested per facility.   pending placement, palliative following   comfort measures, mews exempt  pain control LTC with hospice, SW following, f/u TB QuantiFeron test as requested per facility.   pending placement, palliative following   comfort measures, mews exempt  pain control  TOV today

## 2023-09-07 NOTE — PROGRESS NOTE ADULT - PROBLEM SELECTOR PLAN 5
SCr ~ 4.17--> 3.73  No IV fluids due to EF 15-20%  c/w koehler catheter for comfort care   comfort measures, no lab draws  nephrology Dr. Fair SCr ~ 4.17--> 3.73  No IV fluids due to EF 15-20%  comfort measures, no lab draws  nephrology Dr. Marv HARP koehler catheter for TOV, as pt complaints penile/pelvic pain per nephrology.

## 2023-09-07 NOTE — PROGRESS NOTE ADULT - SUBJECTIVE AND OBJECTIVE BOX
INTERVAL HPI/OVERNIGHT EVENTS: pt seen and examined  9/7/23    MEDICATIONS  (STANDING):  fentaNYL   Patch  25 MICROgram(s)/Hr 1 Patch Transdermal every 72 hours  melatonin 3 milliGRAM(s) Oral at bedtime  pantoprazole    Tablet 40 milliGRAM(s) Oral before breakfast  phenazopyridine 100 milliGRAM(s) Oral every 8 hours  polyethylene glycol 3350 17 Gram(s) Oral daily  senna 2 Tablet(s) Oral at bedtime    MEDICATIONS  (PRN):  acetaminophen     Tablet .. 1000 milliGRAM(s) Oral every 8 hours PRN Moderate Pain (4 - 6)  calamine/zinc oxide Lotion 1 Application(s) Topical two times a day PRN Rash and/or Itching  lidocaine 2% Jelly 5 milliLiter(s) IntraUrethral every 12 hours PRN Pain at koehler site  oxyCODONE    IR 10 milliGRAM(s) Oral every 6 hours PRN Severe Pain (7 - 10)  __________________________________________________  REVIEW OF SYSTEMS:  CONSTITUTIONAL: No fever,   EYES: no acute visual disturbances  NECK: No pain or stiffness  RESPIRATORY: No cough; No shortness of breath  CARDIOVASCULAR: No chest pain, no palpitations  GASTROINTESTINAL: No pain. No nausea or vomiting; No diarrhea   NEUROLOGICAL: No headache or numbness, no tremors  MUSCULOSKELETAL: No joint pain, no muscle pain  GENITOURINARY: + penile/pelvic area pain with koehler catheter, no dysuria, no frequency, no hesitancy  PSYCHIATRY: no depression , no anxiety  ALL OTHER  ROS negative        Vital Signs Last 24 Hrs  T(C): 36.6 (07 Sep 2023 05:35), Max: 36.6 (07 Sep 2023 05:35)  T(F): 97.9 (07 Sep 2023 05:35), Max: 97.9 (07 Sep 2023 05:35)  HR: 100 (07 Sep 2023 05:35) (89 - 100)  BP: 100/64 (07 Sep 2023 05:35) (100/64 - 112/66)  BP(mean): 75 (06 Sep 2023 20:10) (75 - 75)  RR: 18 (07 Sep 2023 05:35) (18 - 18)  SpO2: 96% (07 Sep 2023 05:35) (95% - 96%)    Parameters below as of 07 Sep 2023 05:35  Patient On (Oxygen Delivery Method): room air        ________________________________________________  PHYSICAL EXAM:  GENERAL: NAD, cachectic   HEENT: Normocephalic;  conjunctivae and sclerae clear; moist mucous membranes;   NECK : supple  CHEST/LUNG: dec breath sounds at bases  HEART: S1 S2  regular; no murmurs, gallops or rubs  ABDOMEN: Soft, Nontender, Nondistended; Bowel sounds present  EXTREMITIES: no cyanosis; no edema; no calf tenderness  : Koehler in place, clear urine draining.   SKIN: warm and dry; no rash  NERVOUS SYSTEM:  Awake and alert; _________________________________________________  LABS:                        9.5    19.59 )-----------( 17       ( 06 Sep 2023 07:27 )             28.3     09-06    136  |  101  |  130<H>  ----------------------------<  118<H>  3.0<L>   |  18<L>  |  3.73<H>    Ca    6.3<LL>      06 Sep 2023 07:27    TPro  6.4  /  Alb  2.3<L>  /  TBili  0.5  /  DBili  x   /  AST  12  /  ALT  14  /  AlkPhos  204<H>  09-06      Urinalysis Basic - ( 06 Sep 2023 07:27 )    Color: x / Appearance: x / SG: x / pH: x  Gluc: 118 mg/dL / Ketone: x  / Bili: x / Urobili: x   Blood: x / Protein: x / Nitrite: x   Leuk Esterase: x / RBC: x / WBC x   Sq Epi: x / Non Sq Epi: x / Bacteria: x      CAPILLARY BLOOD GLUCOSE  RADIOLOGY & ADDITIONAL TESTS:  Imaging  Reviewed:  YES  < from: CT Cervical Spine No Cont (08.29.23 @ 18:05) >  ACC: [id number] EXAM:  CT CERVICAL SPINE   ORDERED BY: DENNY BRITO   ACC: [id number] EXAM:  CT BRAIN   ORDERED BY: DENNY BRITO   PROCEDURE DATE:  08/29/2023    INTERPRETATION:  CLINICAL INDICATION: Fall with head trauma, prostate   cancer  Brain CT:  5mm axial sections of the brain were obtained from base to vertex,   without the intravenous administration of contrast material. Coronal and   sagittal computer generated reconstructed views are available.  No prior brain imaging is available for comparison.  The fourth, third and lateral ventricles are normal size and position.   There is no hemorrhage, mass or shift of the midline structures. There is   normal gray white matter differentiation. Bone window examination is   remarkable for a foci of increased density within the frontal calvarium   bilaterally suggestive of sclerotic calvarial metastases.  Cervical spine CT:  Serial thin sections on a multi slice scanner were obtained through the   cervical spine from the C1 to the level T2 in a stacked axial fashion   reformatted at 1.25 mm sections with sagittal and coronal computer   generated reconstructed views.  There is normal alignment of the vertebral bodies and facet joints.  The vertebral bodies are normal in height. There are multiple foci of   increased density within the vertebral bodies in the C3, C4, C7 and T1   and T2 vertebral bodies and are consistent with sclerotic metastasis. No   paraspinal masses are identified. Mild degenerative uncal vertebral joint   and facet changes are noted. There is mild reversal the normal   straightening of the normal cervical lordosis. No acute fractures or   dislocations are identified.    IMPRESSION:     Brain CT: No intracranial hemorrhage or mass. Calvarial metastases.    Cervical spine CT: No acute fractures or dislocations. Degenerative   changes. Osseous metastases.    --- End of Report ---    KIRT PAK MD; Attending Radiologist  This document has been electronically signed. Aug29 2023  6:10PM    < end of copied text >      Consultant(s) Notes Reviewed:   YES      Plan of care was discussed with patient and /or primary care giver; all questions and concerns were addressed

## 2023-09-07 NOTE — PROGRESS NOTE ADULT - ASSESSMENT
64 yo M with pmhx of prostate CA with mets to liver and bone, on Cabazitaxel q3 weeks, presents to the ED for generalized weakness and pain. Follows up with Oncologist Dr. Nasir Gondal. Admitted to ICU for septic shock 2/2  urosepsis vs cardiogenic shock, and febrile neutropenia.    Blood cultures show no growth to date, urine culture revealed e.coli 10,000-49,000 Started on meropenam as of 9/2 but switched to rocephin 1g qd for 7 days to cover uti. ID Dr. Manrique following.    Patient complained of substernal chest pain for a day non radiating. 2x trops were elevated, EKG NSR, repeat troponin shows downgrading trops. PERCY shows EF: 15-20% this likely due chemotherapy side effects. Chest pain is likely due to demand on the heart from severe anemia and it has resolved. Patient is hemodynamically stable.     Also noted with pancytopenia, patient has been afebrile, his leukocyte count has been slowly increasing with filgastrim, received 2 units prbc and 7 units platelets, both still low but increasing.     Patient's JOSE ANGEL is slowly worsening possibly due to volume loss vs low appetite.  GOC has been discussed with family they are leaning towards hospice. Patient is now for LTC with hospice. Palliative and  following. recc: mews exemption and comfort measure only. 62 yo M with pmhx of prostate CA with mets to liver and bone, on Cabazitaxel q3 weeks, presents to the ED for generalized weakness and pain. Follows up with Oncologist Dr. Nasir Gondal. Admitted to ICU for septic shock 2/2  urosepsis vs cardiogenic shock, and febrile neutropenia.    Blood cultures show no growth to date, urine culture revealed e.coli 10,000-49,000 Started on meropenam as of 9/2 but switched to rocephin 1g qd for 7 days to cover uti. ID Dr. Manrique following.    Patient complained of substernal chest pain for a day non radiating. 2x trops were elevated, EKG NSR, repeat troponin shows downgrading trops. PERCY shows EF: 15-20% this likely due chemotherapy side effects. Chest pain is likely due to demand on the heart from severe anemia and it has resolved. Patient is hemodynamically stable.     Also noted with pancytopenia, patient has been afebrile, his leukocyte count has been slowly increasing with filgastrim, received 2 units prbc and 7 units platelets, both still low but increasing.     Patient's JOSE ANGEL is slowly worsening possibly due to volume loss vs low appetite.  GOC has been discussed with family they are leaning towards hospice. Patient is now for LTC with hospice. Palliative and  following. recc: mews exemption and comfort measure only.  pt complaints of pelvic discomfort, discussed with nephrology, DC'd Ailin for trial of void.

## 2023-09-07 NOTE — PROGRESS NOTE ADULT - PROBLEM SELECTOR PLAN 8
LTC with hospice, SW following, f/u TB QuantiFeron test as requested per facility.   pending placement, palliative following   comfort measures, mews exempt  pain control  TOV today

## 2023-09-07 NOTE — PROGRESS NOTE ADULT - SUBJECTIVE AND OBJECTIVE BOX
CARDIOLOGY  *****************    DATE OF SERVICE: 09-07-23    Patient denies chest pain or shortness of breath.  still with scrotal pain. Review of symptoms otherwise negative.    acetaminophen     Tablet .. 1000 milliGRAM(s) Oral every 8 hours PRN  calamine/zinc oxide Lotion 1 Application(s) Topical two times a day PRN  fentaNYL   Patch  25 MICROgram(s)/Hr 1 Patch Transdermal every 72 hours  lidocaine 2% Jelly 5 milliLiter(s) IntraUrethral every 12 hours PRN  melatonin 3 milliGRAM(s) Oral at bedtime  oxyCODONE    IR 10 milliGRAM(s) Oral every 6 hours PRN  pantoprazole    Tablet 40 milliGRAM(s) Oral before breakfast  phenazopyridine 100 milliGRAM(s) Oral every 8 hours  polyethylene glycol 3350 17 Gram(s) Oral daily  senna 2 Tablet(s) Oral at bedtime                            9.5    19.59 )-----------( 17       ( 06 Sep 2023 07:27 )             28.3       Hemoglobin: 9.5 g/dL (09-06 @ 07:27)  Hemoglobin: 9.2 g/dL (09-05 @ 06:39)  Hemoglobin: 8.3 g/dL (09-04 @ 15:30)  Hemoglobin: 9.2 g/dL (09-04 @ 07:44)  Hemoglobin: 8.7 g/dL (09-03 @ 10:16)      09-06    136  |  101  |  130<H>  ----------------------------<  118<H>  3.0<L>   |  18<L>  |  3.73<H>    Ca    6.3<LL>      06 Sep 2023 07:27    TPro  6.4  /  Alb  2.3<L>  /  TBili  0.5  /  DBili  x   /  AST  12  /  ALT  14  /  AlkPhos  204<H>  09-06    Creatinine Trend: 3.73<--, 4.17<--, 4.37<--, 4.24<--, 3.89<--, 3.96<--    COAGS:           T(C): 36.6 (09-07-23 @ 05:35), Max: 36.6 (09-07-23 @ 05:35)  HR: 100 (09-07-23 @ 05:35) (89 - 100)  BP: 100/64 (09-07-23 @ 05:35) (100/64 - 112/66)  RR: 18 (09-07-23 @ 05:35) (18 - 18)  SpO2: 96% (09-07-23 @ 05:35) (95% - 96%)  Wt(kg): --    I&O's Summary    06 Sep 2023 07:01  -  07 Sep 2023 07:00  --------------------------------------------------------  IN: 0 mL / OUT: 1850 mL / NET: -1850 mL      HEENT:  (-)icterus (-)pallor  CV: N S1 S2 1/6 CAMRYN (+)2 Pulses B/l  Resp:  Clear to ausculatation B/L, normal effort  GI: (+) BS Soft, NT, ND  Lymph:  (-)Edema, (-)obvious lymphadenopathy  Skin: Warm to touch, Normal turgor  Psych: Appropriate mood and affect        ASSESSMENT/PLAN: Mr Carrasco is a pleasant 63y Male metastatic prostate CA here with neutropenic fever and mixed shock picture: septic + cardiogenic.    #CHF  He has newly diagnosed congestive heart failure, with severe cardiomyopathy, EF 15-20%, LV nondilated.  recommend to start low dose CHF beta blockers (Toprol XL 25mg daily). if bP remains stable  Prior to discharge we may consider starting pre-load/after-load reducing drugs.  Long term - Denosumab can cause CHF.  Erleada and Denosumab can also accelerate/destablize coronary artery disease.  Should adjust his Onc plan accordingly.  Palliative care weighing in on prognosis and symptom management.      #CKD  He has acute on chronic CKD- his baseline Creat was 1, last year.  This admission, Creat 3.5-4.  Unknown interim values.    # Pain  - Pain management f/u    Peter Skelton MD, [facility]  BEEPER [phone]

## 2023-09-07 NOTE — PROGRESS NOTE ADULT - ASSESSMENT
62 yo M with pmhx of prostate CA with mets to liver and bone, on Cabazitaxel q3 weeks, presents to the ED for generalized weakness and pain. Follows up with Oncologist Dr. Nasir Gondal. Admitted to ICU for septic shock 2/2  urosepsis vs cardiogenic shock, and febrile neutropenia.    Blood cultures show no growth to date, urine culture revealed e.coli 10,000-49,000 Started on meropenam as of 9/2 but switched to rocephin 1g qd for 7 days to cover uti. ID Dr. Manrique following.    Patient complained of substernal chest pain for a day non radiating. 2x trops were elevated, EKG NSR, repeat troponin shows downgrading trops. PERCY shows EF: 15-20% this likely due chemotherapy side effects. Chest pain is likely due to demand on the heart from severe anemia and it has resolved. Patient is hemodynamically stable.     Also noted with pancytopenia, patient has been afebrile, his leukocyte count has been slowly increasing with filgastrim, received 2 units prbc and 7 units platelets, both still low but increasing.     Patient's JOSE ANGEL is slowly worsening possibly due to volume loss vs low appetite.  GOC has been discussed with family they are leaning towards hospice. Patient is now for LTC with hospice. Palliative and  following. recc: mews exemption and comfort measure only.  pt complaints of pelvic discomfort, discussed with nephrology, DC'd Ailin for trial of void.

## 2023-09-07 NOTE — CHART NOTE - NSCHARTNOTEFT_GEN_A_CORE
removed koehler this am, and passed TOV this afternoon. Denies any complaints, will monitor for urinary retention.

## 2023-09-07 NOTE — PROGRESS NOTE ADULT - ASSESSMENT
1. JOSE ANGEL due to ATN from septic shock   CT Scan shows left mild hydroureteronephrosis with collapsed bladder.  -No new labs today since family contemplating possible hospice as per primary team.  -Scr was stable at 3.7mg/dL with increased UO. possible renal recovery.  -Penile pain could be to due to the koehler's cath; could try TOV.   -FeNa >1%. spot protein to creatinine ratio elevated; rpt after infection resolved.  -Adjust meds to eGFR and avoid IV Gadolinium contrast, NSAIDs, and phosphate enema.  -Monitor I/O's daily.   -Monitor SMA daily.  2. Hypotension due to septic shock  - bp is stable. off pressor  -monitor BP.  3. Hyponatremia possible multifactorial due to hypovolemia and high ADH state with lack of free water excretion.  -Na stable 137.  -urine lytes noted.   -Check Seurm Na daily. Monitor I/O's daily. Avoid overcorrection of NA (8-10meq/day)  4. HAGMA due to Lactic Acidosis  -abg noted  -CO2 is stable.  -monitor CO2 and abg.     d/w pt and wife at bedside.  d/w NP.

## 2023-09-07 NOTE — PROGRESS NOTE ADULT - SUBJECTIVE AND OBJECTIVE BOX
NP Note discussed with  Primary Attending    INTERVAL HPI/OVERNIGHT EVENTS: no new complaints    MEDICATIONS  (STANDING):  fentaNYL   Patch  25 MICROgram(s)/Hr 1 Patch Transdermal every 72 hours  melatonin 3 milliGRAM(s) Oral at bedtime  pantoprazole    Tablet 40 milliGRAM(s) Oral before breakfast  phenazopyridine 100 milliGRAM(s) Oral every 8 hours  polyethylene glycol 3350 17 Gram(s) Oral daily  senna 2 Tablet(s) Oral at bedtime    MEDICATIONS  (PRN):  acetaminophen     Tablet .. 1000 milliGRAM(s) Oral every 8 hours PRN Moderate Pain (4 - 6)  calamine/zinc oxide Lotion 1 Application(s) Topical two times a day PRN Rash and/or Itching  lidocaine 2% Jelly 5 milliLiter(s) IntraUrethral every 12 hours PRN Pain at koehler site  oxyCODONE    IR 10 milliGRAM(s) Oral every 6 hours PRN Severe Pain (7 - 10)  __________________________________________________  REVIEW OF SYSTEMS:  CONSTITUTIONAL: No fever,   EYES: no acute visual disturbances  NECK: No pain or stiffness  RESPIRATORY: No cough; No shortness of breath  CARDIOVASCULAR: No chest pain, no palpitations  GASTROINTESTINAL: No pain. No nausea or vomiting; No diarrhea   NEUROLOGICAL: No headache or numbness, no tremors  MUSCULOSKELETAL: No joint pain, no muscle pain  GENITOURINARY: no dysuria, no frequency, no hesitancy  PSYCHIATRY: no depression , no anxiety  ALL OTHER  ROS negative        Vital Signs Last 24 Hrs  T(C): 36.6 (07 Sep 2023 05:35), Max: 36.6 (07 Sep 2023 05:35)  T(F): 97.9 (07 Sep 2023 05:35), Max: 97.9 (07 Sep 2023 05:35)  HR: 100 (07 Sep 2023 05:35) (89 - 100)  BP: 100/64 (07 Sep 2023 05:35) (100/64 - 112/66)  BP(mean): 75 (06 Sep 2023 20:10) (75 - 75)  RR: 18 (07 Sep 2023 05:35) (18 - 18)  SpO2: 96% (07 Sep 2023 05:35) (95% - 96%)    Parameters below as of 07 Sep 2023 05:35  Patient On (Oxygen Delivery Method): room air        ________________________________________________  PHYSICAL EXAM:  GENERAL: NAD, cachectic   HEENT: Normocephalic;  conjunctivae and sclerae clear; moist mucous membranes;   NECK : supple  CHEST/LUNG: Clear to auscultation bilaterally with fair air entry   HEART: S1 S2  regular; no murmurs, gallops or rubs  ABDOMEN: Soft, Nontender, Nondistended; Bowel sounds present  EXTREMITIES: no cyanosis; no edema; no calf tenderness  SKIN: warm and dry; no rash  NERVOUS SYSTEM:  Awake and alert; Oriented  to place, person and time ; no new deficits    _________________________________________________  LABS:                        9.5    19.59 )-----------( 17       ( 06 Sep 2023 07:27 )             28.3     09-06    136  |  101  |  130<H>  ----------------------------<  118<H>  3.0<L>   |  18<L>  |  3.73<H>    Ca    6.3<LL>      06 Sep 2023 07:27    TPro  6.4  /  Alb  2.3<L>  /  TBili  0.5  /  DBili  x   /  AST  12  /  ALT  14  /  AlkPhos  204<H>  09-06      Urinalysis Basic - ( 06 Sep 2023 07:27 )    Color: x / Appearance: x / SG: x / pH: x  Gluc: 118 mg/dL / Ketone: x  / Bili: x / Urobili: x   Blood: x / Protein: x / Nitrite: x   Leuk Esterase: x / RBC: x / WBC x   Sq Epi: x / Non Sq Epi: x / Bacteria: x      CAPILLARY BLOOD GLUCOSE  RADIOLOGY & ADDITIONAL TESTS:  Imaging  Reviewed:  YES  < from: CT Cervical Spine No Cont (08.29.23 @ 18:05) >  ACC: [id number] EXAM:  CT CERVICAL SPINE   ORDERED BY: DENNY BRITO   ACC: [id number] EXAM:  CT BRAIN   ORDERED BY: DENNY BRITO   PROCEDURE DATE:  08/29/2023    INTERPRETATION:  CLINICAL INDICATION: Fall with head trauma, prostate   cancer  Brain CT:  5mm axial sections of the brain were obtained from base to vertex,   without the intravenous administration of contrast material. Coronal and   sagittal computer generated reconstructed views are available.  No prior brain imaging is available for comparison.  The fourth, third and lateral ventricles are normal size and position.   There is no hemorrhage, mass or shift of the midline structures. There is   normal gray white matter differentiation. Bone window examination is   remarkable for a foci of increased density within the frontal calvarium   bilaterally suggestive of sclerotic calvarial metastases.  Cervical spine CT:  Serial thin sections on a multi slice scanner were obtained through the   cervical spine from the C1 to the level T2 in a stacked axial fashion   reformatted at 1.25 mm sections with sagittal and coronal computer   generated reconstructed views.  There is normal alignment of the vertebral bodies and facet joints.  The vertebral bodies are normal in height. There are multiple foci of   increased density within the vertebral bodies in the C3, C4, C7 and T1   and T2 vertebral bodies and are consistent with sclerotic metastasis. No   paraspinal masses are identified. Mild degenerative uncal vertebral joint   and facet changes are noted. There is mild reversal the normal   straightening of the normal cervical lordosis. No acute fractures or   dislocations are identified.    IMPRESSION:     Brain CT: No intracranial hemorrhage or mass. Calvarial metastases.    Cervical spine CT: No acute fractures or dislocations. Degenerative   changes. Osseous metastases.    --- End of Report ---    KIRT PAK MD; Attending Radiologist  This document has been electronically signed. Aug29 2023  6:10PM    < end of copied text >      Consultant(s) Notes Reviewed:   YES      Plan of care was discussed with patient and /or primary care giver; all questions and concerns were addressed  NP Note discussed with  Primary Attending    INTERVAL HPI/OVERNIGHT EVENTS: no new complaints    MEDICATIONS  (STANDING):  fentaNYL   Patch  25 MICROgram(s)/Hr 1 Patch Transdermal every 72 hours  melatonin 3 milliGRAM(s) Oral at bedtime  pantoprazole    Tablet 40 milliGRAM(s) Oral before breakfast  phenazopyridine 100 milliGRAM(s) Oral every 8 hours  polyethylene glycol 3350 17 Gram(s) Oral daily  senna 2 Tablet(s) Oral at bedtime    MEDICATIONS  (PRN):  acetaminophen     Tablet .. 1000 milliGRAM(s) Oral every 8 hours PRN Moderate Pain (4 - 6)  calamine/zinc oxide Lotion 1 Application(s) Topical two times a day PRN Rash and/or Itching  lidocaine 2% Jelly 5 milliLiter(s) IntraUrethral every 12 hours PRN Pain at koehler site  oxyCODONE    IR 10 milliGRAM(s) Oral every 6 hours PRN Severe Pain (7 - 10)  __________________________________________________  REVIEW OF SYSTEMS:  CONSTITUTIONAL: No fever,   EYES: no acute visual disturbances  NECK: No pain or stiffness  RESPIRATORY: No cough; No shortness of breath  CARDIOVASCULAR: No chest pain, no palpitations  GASTROINTESTINAL: No pain. No nausea or vomiting; No diarrhea   NEUROLOGICAL: No headache or numbness, no tremors  MUSCULOSKELETAL: No joint pain, no muscle pain  GENITOURINARY: + penile/pelvic area pain with koehler catheter, no dysuria, no frequency, no hesitancy  PSYCHIATRY: no depression , no anxiety  ALL OTHER  ROS negative        Vital Signs Last 24 Hrs  T(C): 36.6 (07 Sep 2023 05:35), Max: 36.6 (07 Sep 2023 05:35)  T(F): 97.9 (07 Sep 2023 05:35), Max: 97.9 (07 Sep 2023 05:35)  HR: 100 (07 Sep 2023 05:35) (89 - 100)  BP: 100/64 (07 Sep 2023 05:35) (100/64 - 112/66)  BP(mean): 75 (06 Sep 2023 20:10) (75 - 75)  RR: 18 (07 Sep 2023 05:35) (18 - 18)  SpO2: 96% (07 Sep 2023 05:35) (95% - 96%)    Parameters below as of 07 Sep 2023 05:35  Patient On (Oxygen Delivery Method): room air        ________________________________________________  PHYSICAL EXAM:  GENERAL: NAD, cachectic   HEENT: Normocephalic;  conjunctivae and sclerae clear; moist mucous membranes;   NECK : supple  CHEST/LUNG: Clear to auscultation bilaterally with fair air entry   HEART: S1 S2  regular; no murmurs, gallops or rubs  ABDOMEN: Soft, Nontender, Nondistended; Bowel sounds present  EXTREMITIES: no cyanosis; no edema; no calf tenderness  : Koehler in place, clear urine draining.   SKIN: warm and dry; no rash  NERVOUS SYSTEM:  Awake and alert; Oriented  to place, person and time ; no new deficits    _________________________________________________  LABS:                        9.5    19.59 )-----------( 17       ( 06 Sep 2023 07:27 )             28.3     09-06    136  |  101  |  130<H>  ----------------------------<  118<H>  3.0<L>   |  18<L>  |  3.73<H>    Ca    6.3<LL>      06 Sep 2023 07:27    TPro  6.4  /  Alb  2.3<L>  /  TBili  0.5  /  DBili  x   /  AST  12  /  ALT  14  /  AlkPhos  204<H>  09-06      Urinalysis Basic - ( 06 Sep 2023 07:27 )    Color: x / Appearance: x / SG: x / pH: x  Gluc: 118 mg/dL / Ketone: x  / Bili: x / Urobili: x   Blood: x / Protein: x / Nitrite: x   Leuk Esterase: x / RBC: x / WBC x   Sq Epi: x / Non Sq Epi: x / Bacteria: x      CAPILLARY BLOOD GLUCOSE  RADIOLOGY & ADDITIONAL TESTS:  Imaging  Reviewed:  YES  < from: CT Cervical Spine No Cont (08.29.23 @ 18:05) >  ACC: [id number] EXAM:  CT CERVICAL SPINE   ORDERED BY: DENNY BRITO   ACC: [id number] EXAM:  CT BRAIN   ORDERED BY: DENNY BRITO   PROCEDURE DATE:  08/29/2023    INTERPRETATION:  CLINICAL INDICATION: Fall with head trauma, prostate   cancer  Brain CT:  5mm axial sections of the brain were obtained from base to vertex,   without the intravenous administration of contrast material. Coronal and   sagittal computer generated reconstructed views are available.  No prior brain imaging is available for comparison.  The fourth, third and lateral ventricles are normal size and position.   There is no hemorrhage, mass or shift of the midline structures. There is   normal gray white matter differentiation. Bone window examination is   remarkable for a foci of increased density within the frontal calvarium   bilaterally suggestive of sclerotic calvarial metastases.  Cervical spine CT:  Serial thin sections on a multi slice scanner were obtained through the   cervical spine from the C1 to the level T2 in a stacked axial fashion   reformatted at 1.25 mm sections with sagittal and coronal computer   generated reconstructed views.  There is normal alignment of the vertebral bodies and facet joints.  The vertebral bodies are normal in height. There are multiple foci of   increased density within the vertebral bodies in the C3, C4, C7 and T1   and T2 vertebral bodies and are consistent with sclerotic metastasis. No   paraspinal masses are identified. Mild degenerative uncal vertebral joint   and facet changes are noted. There is mild reversal the normal   straightening of the normal cervical lordosis. No acute fractures or   dislocations are identified.    IMPRESSION:     Brain CT: No intracranial hemorrhage or mass. Calvarial metastases.    Cervical spine CT: No acute fractures or dislocations. Degenerative   changes. Osseous metastases.    --- End of Report ---    KIRT PAK MD; Attending Radiologist  This document has been electronically signed. Aug29 2023  6:10PM    < end of copied text >      Consultant(s) Notes Reviewed:   YES      Plan of care was discussed with patient and /or primary care giver; all questions and concerns were addressed

## 2023-09-07 NOTE — PROGRESS NOTE ADULT - PROBLEM SELECTOR PLAN 2
p/w tachycardia, fever, low blood pressure  urine culture revealed e.coli 10,000-49,000   blood cultures NGTD   s/p meropenem switched to Rocephin   pt now comfort measures and mews exempt  pending LTC w/ hospice, SW following.   ID Dr. Manrique following

## 2023-09-08 RX ADMIN — Medication 3 MILLIGRAM(S): at 22:23

## 2023-09-08 RX ADMIN — Medication 100 MILLIGRAM(S): at 14:59

## 2023-09-08 RX ADMIN — SENNA PLUS 2 TABLET(S): 8.6 TABLET ORAL at 22:23

## 2023-09-08 RX ADMIN — Medication 100 MILLIGRAM(S): at 06:16

## 2023-09-08 RX ADMIN — PANTOPRAZOLE SODIUM 40 MILLIGRAM(S): 20 TABLET, DELAYED RELEASE ORAL at 06:16

## 2023-09-08 RX ADMIN — Medication 100 MILLIGRAM(S): at 22:23

## 2023-09-08 NOTE — PROGRESS NOTE ADULT - PROBLEM SELECTOR PLAN 8
LTC with hospice, SW following, f/u TB QuantiFeron test as requested per facility.   pending placement, palliative following   comfort measures, mews exempt  pain control

## 2023-09-08 NOTE — PROGRESS NOTE ADULT - ASSESSMENT
64 yo M with pmhx of prostate CA with mets to liver and bone, on Cabazitaxel q3 weeks, presents to the ED for generalized weakness and pain. Follows up with Oncologist Dr. Nasir Gondal. Admitted to ICU for septic shock 2/2  urosepsis vs cardiogenic shock, and febrile neutropenia.    Blood cultures show no growth to date, urine culture revealed e.coli 10,000-49,000 Started on meropenam as of 9/2 but switched to rocephin 1g qd for 7 days to cover uti. ID Dr. Manrique following.    Patient complained of substernal chest pain for a day non radiating. 2x trops were elevated, EKG NSR, repeat troponin shows downgrading trops. PERCY shows EF: 15-20% this likely due chemotherapy side effects. Chest pain is likely due to demand on the heart from severe anemia and it has resolved. Patient is hemodynamically stable.     Also noted with pancytopenia, patient has been afebrile, his leukocyte count has been slowly increasing with filgastrim, received 2 units prbc and 7 units platelets, both still low but increasing.     Patient's JOSE ANGEL is slowly worsening possibly due to volume loss vs low appetite.  GOC has been discussed with family they are leaning towards hospice. Patient is now for LTC with hospice. Palliative and  following. recc: mews exemption and comfort measure only.  pt complaints of pelvic discomfort, discussed with nephrology, KATIUSKA'ann marie Parisi and passed TOV.   SW following for placement.

## 2023-09-08 NOTE — CHART NOTE - NSCHARTNOTEFT_GEN_A_CORE
Collaborated with primary attending Dr. Douglass  Patient medically stable, GOC are clear, awaiting for LTC placement with hospice.    No other acute/additional palliative care needs identified at this time.  Palliative Care team will sign off.  Please reconsult PRN.

## 2023-09-08 NOTE — PROGRESS NOTE ADULT - SUBJECTIVE AND OBJECTIVE BOX
INTERVAL HPI/OVERNIGHT EVENTS: seen in bed, sleeping on exam but opens eyes and cooperative.     MEDICATIONS  (STANDING):  fentaNYL   Patch  25 MICROgram(s)/Hr 1 Patch Transdermal every 72 hours  melatonin 3 milliGRAM(s) Oral at bedtime  pantoprazole    Tablet 40 milliGRAM(s) Oral before breakfast  phenazopyridine 100 milliGRAM(s) Oral every 8 hours  polyethylene glycol 3350 17 Gram(s) Oral daily  senna 2 Tablet(s) Oral at bedtime    MEDICATIONS  (PRN):  acetaminophen     Tablet .. 1000 milliGRAM(s) Oral every 8 hours PRN Moderate Pain (4 - 6)  calamine/zinc oxide Lotion 1 Application(s) Topical two times a day PRN Rash and/or Itching  lidocaine 2% Jelly 5 milliLiter(s) IntraUrethral every 12 hours PRN Pain at koehler site  oxyCODONE    IR 10 milliGRAM(s) Oral every 6 hours PRN Severe Pain (7 - 10)      __________________________________________________  REVIEW OF SYSTEMS:    CONSTITUTIONAL: No fever,   EYES: no acute visual disturbances  NECK: No pain or stiffness  RESPIRATORY: No cough; No shortness of breath  CARDIOVASCULAR: No chest pain, no palpitations  GASTROINTESTINAL: No pain. No nausea or vomiting; No diarrhea   NEUROLOGICAL: No headache or numbness, no tremors  MUSCULOSKELETAL: No joint pain, no muscle pain  GENITOURINARY: no dysuria, no frequency, no hesitancy  PSYCHIATRY: no depression , no anxiety  ALL OTHER  ROS negative        Vital Signs Last 24 Hrs  T(C): 37.1 (08 Sep 2023 05:35), Max: 37.3 (07 Sep 2023 20:51)  T(F): 98.7 (08 Sep 2023 05:35), Max: 99.1 (07 Sep 2023 20:51)  HR: 105 (08 Sep 2023 05:35) (82 - 113)  BP: 96/55 (08 Sep 2023 05:35) (96/55 - 103/62)  BP(mean): --  RR: 17 (08 Sep 2023 05:35) (17 - 18)  SpO2: 97% (08 Sep 2023 05:35) (96% - 99%)    Parameters below as of 08 Sep 2023 05:35  Patient On (Oxygen Delivery Method): room air        ________________________________________________  PHYSICAL EXAM:  GENERAL: NAD, cachectic, chronically ill appearing   HEENT: Normocephalic;  conjunctivae and sclerae clear; moist mucous membranes;   NECK : supple  CHEST/LUNG: dec breath sounds at bases  HEART: S1 S2  regular; no murmurs, gallops or rubs  ABDOMEN: Soft, Nontender, Nondistended; Bowel sounds present  EXTREMITIES: no cyanosis; no edema; no calf tenderness  SKIN: warm and dry; no rash  NERVOUS SYSTEM:  Awake and alert; Oriented  to person and place; no new deficits    _________________________________________________  LABS:    CAPILLARY BLOOD GLUCOSE  RADIOLOGY & ADDITIONAL TESTS:    Imaging  Reviewed:  YES  < from: CT Cervical Spine No Cont (08.29.23 @ 18:05) >    ACC: [id number] EXAM:  CT CERVICAL SPINE   ORDERED BY: DENNY BRITO     ACC: [id number] EXAM:  CT BRAIN   ORDERED BY: DENNY BRITO     PROCEDURE DATE:  08/29/2023          INTERPRETATION:  CLINICAL INDICATION: Fall with head trauma, prostate   cancer    Brain CT:    5mm axial sections of the brain were obtained from base to vertex,   without the intravenous administration of contrast material. Coronal and   sagittal computer generated reconstructed views are available.    No prior brain imaging is available for comparison.    The fourth, third and lateral ventricles are normal size and position.   There is no hemorrhage, mass or shift of the midline structures. There is   normal gray white matter differentiation. Bone window examination is   remarkable for a foci of increased density within the frontal calvarium   bilaterally suggestive of sclerotic calvarial metastases.    Cervical spine CT:    Serial thin sections on a multi slice scanner were obtained through the   cervical spine from the C1 to the level T2 in a stacked axial fashion   reformatted at 1.25 mm sections with sagittal and coronal computer   generated reconstructed views.    There is normal alignment of the vertebral bodies and facet joints.    The vertebral bodies are normal in height. There are multiple foci of   increased density within the vertebral bodies in the C3, C4, C7 and T1   and T2 vertebral bodies and are consistent with sclerotic metastasis. No   paraspinal masses are identified. Mild degenerative uncal vertebral joint   and facet changes are noted. There is mild reversal the normal   straightening of the normal cervical lordosis. No acute fractures or   dislocations are identified.    IMPRESSION:     Brain CT: No intracranial hemorrhage or mass. Calvarial metastases.    Cervical spine CT: No acute fractures or dislocations. Degenerative   changes. Osseous metastases.    --- End of Report ---            KIRT PAK MD; Attending Radiologist  This document has been electronically signed. Aug29 2023  6:10PM    < end of copied text >    Consultant(s) Notes Reviewed:   YES      Plan of care was discussed with patient and /or primary care giver; all questions and concerns were addressed

## 2023-09-08 NOTE — PROGRESS NOTE ADULT - SUBJECTIVE AND OBJECTIVE BOX
CARDIOLOGY  *****************    DATE OF SERVICE: 09-08-23    Patient denies chest pain or shortness of breath.  still with scrotal and penile pain with urination   Review of symptoms otherwise negative.    acetaminophen     Tablet .. 1000 milliGRAM(s) Oral every 8 hours PRN  calamine/zinc oxide Lotion 1 Application(s) Topical two times a day PRN  fentaNYL   Patch  25 MICROgram(s)/Hr 1 Patch Transdermal every 72 hours  lidocaine 2% Jelly 5 milliLiter(s) IntraUrethral every 12 hours PRN  melatonin 3 milliGRAM(s) Oral at bedtime  oxyCODONE    IR 10 milliGRAM(s) Oral every 6 hours PRN  pantoprazole    Tablet 40 milliGRAM(s) Oral before breakfast  phenazopyridine 100 milliGRAM(s) Oral every 8 hours  polyethylene glycol 3350 17 Gram(s) Oral daily  senna 2 Tablet(s) Oral at bedtime      Hemoglobin: 9.5 g/dL (09-06 @ 07:27)  Hemoglobin: 9.2 g/dL (09-05 @ 06:39)  Hemoglobin: 8.3 g/dL (09-04 @ 15:30)  Hemoglobin: 9.2 g/dL (09-04 @ 07:44)        Creatinine Trend: 3.73<--, 4.17<--, 4.37<--, 4.24<--, 3.89<--, 3.96<--    COAGS:       T(C): 37.1 (09-08-23 @ 05:35), Max: 37.3 (09-07-23 @ 20:51)  HR: 105 (09-08-23 @ 05:35) (82 - 113)  BP: 96/55 (09-08-23 @ 05:35) (96/55 - 106/68)  RR: 17 (09-08-23 @ 05:35) (17 - 18)  SpO2: 97% (09-08-23 @ 05:35) (96% - 100%)  Wt(kg): --    I&O's Summary    07 Sep 2023 07:01  -  08 Sep 2023 07:00  --------------------------------------------------------  IN: 0 mL / OUT: 1500 mL / NET: -1500 mL        HEENT:  (-)icterus (-)pallor  CV: N S1 S2 1/6 CAMRYN (+)2 Pulses B/l  Resp:  Clear to ausculatation B/L, normal effort  GI: (+) BS Soft, NT, ND  Lymph:  (-)Edema, (-)obvious lymphadenopathy  Skin: Warm to touch, Normal turgor  Psych: Appropriate mood and affect        ASSESSMENT/PLAN: Mr Carrasco is a pleasant 63y Male metastatic prostate CA here with neutropenic fever and mixed shock picture: septic + cardiogenic.    #CHF  He has newly diagnosed congestive heart failure, with severe cardiomyopathy, EF 15-20%, LV nondilated.  recommend to start low dose CHF beta blockers (Toprol XL 25mg daily). if bP remains stable  Prior to discharge we may consider starting pre-load/after-load reducing drugs.  Long term - Denosumab can cause CHF.  Erleada and Denosumab can also accelerate/destablize coronary artery disease.  Should adjust his Onc plan accordingly.  Palliative care weighing in on prognosis and symptom management.      #CKD  He has acute on chronic CKD- his baseline Creat was 1, last year.  This admission, Creat 3.5-4.  Unknown interim values.    # Pain  - Pain management f/u    Peter Skelton MD, [facility]  BEEPER [phone]

## 2023-09-08 NOTE — PROGRESS NOTE ADULT - SUBJECTIVE AND OBJECTIVE BOX
NP Note discussed with  Primary Attending    INTERVAL HPI/OVERNIGHT EVENTS: seen in bed, sleeping on exam but opens eyes and cooperative.     MEDICATIONS  (STANDING):  fentaNYL   Patch  25 MICROgram(s)/Hr 1 Patch Transdermal every 72 hours  melatonin 3 milliGRAM(s) Oral at bedtime  pantoprazole    Tablet 40 milliGRAM(s) Oral before breakfast  phenazopyridine 100 milliGRAM(s) Oral every 8 hours  polyethylene glycol 3350 17 Gram(s) Oral daily  senna 2 Tablet(s) Oral at bedtime    MEDICATIONS  (PRN):  acetaminophen     Tablet .. 1000 milliGRAM(s) Oral every 8 hours PRN Moderate Pain (4 - 6)  calamine/zinc oxide Lotion 1 Application(s) Topical two times a day PRN Rash and/or Itching  lidocaine 2% Jelly 5 milliLiter(s) IntraUrethral every 12 hours PRN Pain at koehler site  oxyCODONE    IR 10 milliGRAM(s) Oral every 6 hours PRN Severe Pain (7 - 10)      __________________________________________________  REVIEW OF SYSTEMS:    CONSTITUTIONAL: No fever,   EYES: no acute visual disturbances  NECK: No pain or stiffness  RESPIRATORY: No cough; No shortness of breath  CARDIOVASCULAR: No chest pain, no palpitations  GASTROINTESTINAL: No pain. No nausea or vomiting; No diarrhea   NEUROLOGICAL: No headache or numbness, no tremors  MUSCULOSKELETAL: No joint pain, no muscle pain  GENITOURINARY: no dysuria, no frequency, no hesitancy  PSYCHIATRY: no depression , no anxiety  ALL OTHER  ROS negative        Vital Signs Last 24 Hrs  T(C): 37.1 (08 Sep 2023 05:35), Max: 37.3 (07 Sep 2023 20:51)  T(F): 98.7 (08 Sep 2023 05:35), Max: 99.1 (07 Sep 2023 20:51)  HR: 105 (08 Sep 2023 05:35) (82 - 113)  BP: 96/55 (08 Sep 2023 05:35) (96/55 - 103/62)  BP(mean): --  RR: 17 (08 Sep 2023 05:35) (17 - 18)  SpO2: 97% (08 Sep 2023 05:35) (96% - 99%)    Parameters below as of 08 Sep 2023 05:35  Patient On (Oxygen Delivery Method): room air        ________________________________________________  PHYSICAL EXAM:  GENERAL: NAD, cachectic, chronically ill appearing   HEENT: Normocephalic;  conjunctivae and sclerae clear; moist mucous membranes;   NECK : supple  CHEST/LUNG: Clear to auscultation bilaterally with fair air entry   HEART: S1 S2  regular; no murmurs, gallops or rubs  ABDOMEN: Soft, Nontender, Nondistended; Bowel sounds present  EXTREMITIES: no cyanosis; no edema; no calf tenderness  SKIN: warm and dry; no rash  NERVOUS SYSTEM:  Awake and alert; Oriented  to person and place; no new deficits    _________________________________________________  LABS:    CAPILLARY BLOOD GLUCOSE  RADIOLOGY & ADDITIONAL TESTS:    Imaging  Reviewed:  YES  < from: CT Cervical Spine No Cont (08.29.23 @ 18:05) >    ACC: [id number] EXAM:  CT CERVICAL SPINE   ORDERED BY: DENNY BRITO     ACC: [id number] EXAM:  CT BRAIN   ORDERED BY: DENNY BRITO     PROCEDURE DATE:  08/29/2023          INTERPRETATION:  CLINICAL INDICATION: Fall with head trauma, prostate   cancer    Brain CT:    5mm axial sections of the brain were obtained from base to vertex,   without the intravenous administration of contrast material. Coronal and   sagittal computer generated reconstructed views are available.    No prior brain imaging is available for comparison.    The fourth, third and lateral ventricles are normal size and position.   There is no hemorrhage, mass or shift of the midline structures. There is   normal gray white matter differentiation. Bone window examination is   remarkable for a foci of increased density within the frontal calvarium   bilaterally suggestive of sclerotic calvarial metastases.    Cervical spine CT:    Serial thin sections on a multi slice scanner were obtained through the   cervical spine from the C1 to the level T2 in a stacked axial fashion   reformatted at 1.25 mm sections with sagittal and coronal computer   generated reconstructed views.    There is normal alignment of the vertebral bodies and facet joints.    The vertebral bodies are normal in height. There are multiple foci of   increased density within the vertebral bodies in the C3, C4, C7 and T1   and T2 vertebral bodies and are consistent with sclerotic metastasis. No   paraspinal masses are identified. Mild degenerative uncal vertebral joint   and facet changes are noted. There is mild reversal the normal   straightening of the normal cervical lordosis. No acute fractures or   dislocations are identified.    IMPRESSION:     Brain CT: No intracranial hemorrhage or mass. Calvarial metastases.    Cervical spine CT: No acute fractures or dislocations. Degenerative   changes. Osseous metastases.    --- End of Report ---            KIRT PAK MD; Attending Radiologist  This document has been electronically signed. Aug29 2023  6:10PM    < end of copied text >    Consultant(s) Notes Reviewed:   YES      Plan of care was discussed with patient and /or primary care giver; all questions and concerns were addressed

## 2023-09-08 NOTE — PROGRESS NOTE ADULT - PROBLEM SELECTOR PLAN 5
SCr ~ 4.17--> 3.73  No IV fluids due to EF 15-20%  comfort measures, no lab draws  nephrology Dr. Fair  DC'd koehler and passed TOV 9/7.

## 2023-09-08 NOTE — PROGRESS NOTE ADULT - ASSESSMENT
1. JOSE ANGEL due to ATN from septic shock   CT Scan shows left mild hydroureteronephrosis with collapsed bladder.  -No labs since family doesn't want aggressive measure and want hospice as per primary team.  -awaiting LTC placement with hospice.  -Scr was stable at 3.7mg/dL with increased UO. possible renal recovery.  -Passed TOV and penile pain has gotten better.   -FeNa >1%. spot protein to creatinine ratio elevated; rpt after infection resolved.  -Adjust meds to eGFR and avoid IV Gadolinium contrast, NSAIDs, and phosphate enema.  -Monitor I/O's daily.   -Monitor SMA daily.  2. Hypotension due to septic shock  - bp is stable. off pressor  -monitor BP.  3. Hyponatremia possible multifactorial due to hypovolemia and high ADH state with lack of free water excretion.  -Na stable 137.  -urine lytes noted.   -Check Seurm Na daily. Monitor I/O's daily. Avoid overcorrection of NA (8-10meq/day)  4. HAGMA due to Lactic Acidosis  -abg noted  -CO2 is stable.  -monitor CO2 and abg.     will signoff the case.  d/w pt and wife at bedside.  d/w NP.

## 2023-09-08 NOTE — PROGRESS NOTE ADULT - SUBJECTIVE AND OBJECTIVE BOX
NEPHROLOGY MEDICAL CARE, [facility] - Dr. Shukri Fair/ Dr. Caterina Gibbons/ Dr. Papa Conley/ Dr. Lissa Rene    Date of Service: 09-08-23 @ 12:42    Patient was seen and examined at bedside.    CC: patient is okay and NAD  penile pain is better without koehler's cath.    Vital Signs Last 24 Hrs  T(C): 37.1 (08 Sep 2023 05:35), Max: 37.3 (07 Sep 2023 20:51)  T(F): 98.7 (08 Sep 2023 05:35), Max: 99.1 (07 Sep 2023 20:51)  HR: 105 (08 Sep 2023 05:35) (82 - 113)  BP: 96/55 (08 Sep 2023 05:35) (96/55 - 106/68)  BP(mean): 76 (07 Sep 2023 13:50) (76 - 76)  RR: 17 (08 Sep 2023 05:35) (17 - 18)  SpO2: 97% (08 Sep 2023 05:35) (96% - 100%)    Parameters below as of 08 Sep 2023 05:35  Patient On (Oxygen Delivery Method): room air        09-07 @ 07:01  -  09-08 @ 07:00  --------------------------------------------------------  IN: 0 mL / OUT: 1500 mL / NET: -1500 mL        PHYSICAL EXAM:  General: No acute respiratory distress.  Eyes: conjunctiva and sclera clear  ENMT: Atraumatic, Normocephalic, supple,   Respiratory: Bilateral poor air entry; No rales, rhonchi, wheezing  Cardiovascular: S1S2+; no m/r/g  Gastrointestinal: Soft, Non-tender, Nondistended; Bowel sounds present  : no koehler's cath.  Neuro:  Awake, Alert & Oriented X3,   Ext:  trace edema, No Cyanosis  Skin: No visible rashes      MEDICATIONS:  MEDICATIONS  (STANDING):  fentaNYL   Patch  25 MICROgram(s)/Hr 1 Patch Transdermal every 72 hours  melatonin 3 milliGRAM(s) Oral at bedtime  pantoprazole    Tablet 40 milliGRAM(s) Oral before breakfast  phenazopyridine 100 milliGRAM(s) Oral every 8 hours  polyethylene glycol 3350 17 Gram(s) Oral daily  senna 2 Tablet(s) Oral at bedtime    MEDICATIONS  (PRN):  acetaminophen     Tablet .. 1000 milliGRAM(s) Oral every 8 hours PRN Moderate Pain (4 - 6)  calamine/zinc oxide Lotion 1 Application(s) Topical two times a day PRN Rash and/or Itching  lidocaine 2% Jelly 5 milliLiter(s) IntraUrethral every 12 hours PRN Pain at koehler site  oxyCODONE    IR 10 milliGRAM(s) Oral every 6 hours PRN Severe Pain (7 - 10)          LABS:                Urine studies    PTH and Vit D:

## 2023-09-09 LAB
GAMMA INTERFERON BACKGROUND BLD IA-ACNC: 0.01 IU/ML — SIGNIFICANT CHANGE UP
M TB IFN-G BLD-IMP: NEGATIVE — SIGNIFICANT CHANGE UP
M TB IFN-G CD4+ BCKGRND COR BLD-ACNC: 0.01 IU/ML — SIGNIFICANT CHANGE UP
M TB IFN-G CD4+CD8+ BCKGRND COR BLD-ACNC: 0.01 IU/ML — SIGNIFICANT CHANGE UP
QUANT TB PLUS MITOGEN MINUS NIL: 0.77 IU/ML — SIGNIFICANT CHANGE UP

## 2023-09-09 RX ADMIN — OXYCODONE HYDROCHLORIDE 10 MILLIGRAM(S): 5 TABLET ORAL at 02:42

## 2023-09-09 RX ADMIN — OXYCODONE HYDROCHLORIDE 10 MILLIGRAM(S): 5 TABLET ORAL at 03:18

## 2023-09-09 RX ADMIN — PANTOPRAZOLE SODIUM 40 MILLIGRAM(S): 20 TABLET, DELAYED RELEASE ORAL at 05:32

## 2023-09-09 RX ADMIN — Medication 3 MILLIGRAM(S): at 21:28

## 2023-09-09 RX ADMIN — OXYCODONE HYDROCHLORIDE 10 MILLIGRAM(S): 5 TABLET ORAL at 22:00

## 2023-09-09 RX ADMIN — OXYCODONE HYDROCHLORIDE 10 MILLIGRAM(S): 5 TABLET ORAL at 21:29

## 2023-09-09 RX ADMIN — Medication 100 MILLIGRAM(S): at 13:59

## 2023-09-09 RX ADMIN — Medication 100 MILLIGRAM(S): at 21:28

## 2023-09-09 RX ADMIN — Medication 100 MILLIGRAM(S): at 05:32

## 2023-09-09 NOTE — PROGRESS NOTE ADULT - SUBJECTIVE AND OBJECTIVE BOX
pt seen and examined  9/9/23    MEDICATIONS  (STANDING):  fentaNYL   Patch  25 MICROgram(s)/Hr 1 Patch Transdermal every 72 hours  melatonin 3 milliGRAM(s) Oral at bedtime  pantoprazole    Tablet 40 milliGRAM(s) Oral before breakfast  phenazopyridine 100 milliGRAM(s) Oral every 8 hours  polyethylene glycol 3350 17 Gram(s) Oral daily  senna 2 Tablet(s) Oral at bedtime    MEDICATIONS  (PRN):  acetaminophen     Tablet .. 1000 milliGRAM(s) Oral every 8 hours PRN Moderate Pain (4 - 6)  calamine/zinc oxide Lotion 1 Application(s) Topical two times a day PRN Rash and/or Itching  lidocaine 2% Jelly 5 milliLiter(s) IntraUrethral every 12 hours PRN Pain at koehler site  oxyCODONE    IR 10 milliGRAM(s) Oral every 6 hours PRN Severe Pain (7 - 10)      Vital Signs Last 24 Hrs  T(C): 37.1 (09-09-23 @ 20:20), Max: 37.1 (09-09-23 @ 20:20)  T(F): 98.8 (09-09-23 @ 20:20), Max: 98.8 (09-09-23 @ 20:20)  HR: 109 (09-09-23 @ 20:20) (105 - 109)  BP: 92/67 (09-09-23 @ 20:20) (92/67 - 99/70)  BP(mean): 74 (09-09-23 @ 20:20) (74 - 74)  RR: 18 (09-09-23 @ 20:20) (16 - 18)  SpO2: 96% (09-09-23 @ 20:20) (96% - 96%)      ________________________________________________  PHYSICAL EXAM:  GENERAL: NAD, cachectic, chronically ill appearing   HEENT: Normocephalic;  conjunctivae and sclerae clear; moist mucous membranes;   NECK : supple  CHEST/LUNG: dec breath sounds at bases  HEART: S1 S2  regular; no murmurs, gallops or rubs  ABDOMEN: Soft, Nontender, Nondistended; Bowel sounds present  EXTREMITIES: no cyanosis; no edema; no calf tenderness  SKIN: warm and dry; no rash  NERVOUS SYSTEM:  Awake and alert;   _________________________________________________  LABS:    CAPILLARY BLOOD GLUCOSE  RADIOLOGY & ADDITIONAL TESTS:    Imaging  Reviewed:  YES  < from: CT Cervical Spine No Cont (08.29.23 @ 18:05) >    ACC: [id number] EXAM:  CT CERVICAL SPINE   ORDERED BY: DENNY BRITO     ACC: [id number] EXAM:  CT BRAIN   ORDERED BY: DENNY BRIOT     PROCEDURE DATE:  08/29/2023          INTERPRETATION:  CLINICAL INDICATION: Fall with head trauma, prostate   cancer    Brain CT:    5mm axial sections of the brain were obtained from base to vertex,   without the intravenous administration of contrast material. Coronal and   sagittal computer generated reconstructed views are available.    No prior brain imaging is available for comparison.    The fourth, third and lateral ventricles are normal size and position.   There is no hemorrhage, mass or shift of the midline structures. There is   normal gray white matter differentiation. Bone window examination is   remarkable for a foci of increased density within the frontal calvarium   bilaterally suggestive of sclerotic calvarial metastases.    Cervical spine CT:    Serial thin sections on a multi slice scanner were obtained through the   cervical spine from the C1 to the level T2 in a stacked axial fashion   reformatted at 1.25 mm sections with sagittal and coronal computer   generated reconstructed views.    There is normal alignment of the vertebral bodies and facet joints.    The vertebral bodies are normal in height. There are multiple foci of   increased density within the vertebral bodies in the C3, C4, C7 and T1   and T2 vertebral bodies and are consistent with sclerotic metastasis. No   paraspinal masses are identified. Mild degenerative uncal vertebral joint   and facet changes are noted. There is mild reversal the normal   straightening of the normal cervical lordosis. No acute fractures or   dislocations are identified.    IMPRESSION:     Brain CT: No intracranial hemorrhage or mass. Calvarial metastases.    Cervical spine CT: No acute fractures or dislocations. Degenerative   changes. Osseous metastases.    --- End of Report ---            KIRT PAK MD; Attending Radiologist  This document has been electronically signed. Aug29 2023  6:10PM    < end of copied text >    Consultant(s) Notes Reviewed:   YES      Plan of care was discussed with patient and /or primary care giver; all questions and concerns were addressed

## 2023-09-09 NOTE — PROGRESS NOTE ADULT - SUBJECTIVE AND OBJECTIVE BOX
CARDIOLOGY  *****************    DATE OF SERVICE: 09-09-23     pt seen and examined, no complaints, ROS - .        acetaminophen     Tablet .. 1000 milliGRAM(s) Oral every 8 hours PRN  calamine/zinc oxide Lotion 1 Application(s) Topical two times a day PRN  fentaNYL   Patch  25 MICROgram(s)/Hr 1 Patch Transdermal every 72 hours  lidocaine 2% Jelly 5 milliLiter(s) IntraUrethral every 12 hours PRN  melatonin 3 milliGRAM(s) Oral at bedtime  oxyCODONE    IR 10 milliGRAM(s) Oral every 6 hours PRN  pantoprazole    Tablet 40 milliGRAM(s) Oral before breakfast  phenazopyridine 100 milliGRAM(s) Oral every 8 hours  polyethylene glycol 3350 17 Gram(s) Oral daily  senna 2 Tablet(s) Oral at bedtime          Hemoglobin: 9.5 g/dL (09-06 @ 07:27)  Hemoglobin: 9.2 g/dL (09-05 @ 06:39)  Hemoglobin: 8.3 g/dL (09-04 @ 15:30)            Creatinine Trend: 3.73<--, 4.17<--, 4.37<--, 4.24<--, 3.89<--, 3.96<--    COAGS:           T(C): 37 (09-09-23 @ 05:58), Max: 37.1 (09-08-23 @ 20:34)  HR: 105 (09-09-23 @ 05:58) (79 - 105)  BP: 93/63 (09-09-23 @ 05:58) (93/63 - 104/70)  RR: 18 (09-09-23 @ 05:58) (16 - 18)  SpO2: 96% (09-09-23 @ 05:58) (95% - 96%)  Wt(kg): --    I&O's Summary    HEENT:  (-)icterus (-)pallor  CV: N S1 S2 1/6 CAMRYN (+)2 Pulses B/l  Resp:  Clear to ausculatation B/L, normal effort  GI: (+) BS Soft, NT, ND  Lymph:  (-)Edema, (-)obvious lymphadenopathy  Skin: Warm to touch, Normal turgor  Psych: Appropriate mood and affect        ASSESSMENT/PLAN: Mr Carrasco is a pleasant 63y Male metastatic prostate CA here with neutropenic fever and mixed shock picture: septic + cardiogenic.    #CHF  He has newly diagnosed congestive heart failure, with severe cardiomyopathy, EF 15-20%, LV nondilated.  recommend to start low dose CHF beta blockers (Toprol XL 25mg daily). if bP remains stable  Prior to discharge we may consider starting pre-load/after-load reducing drugs.  Long term - Denosumab can cause CHF.  Erleada and Denosumab can also accelerate/destablize coronary artery disease.  Should adjust his Onc plan accordingly.  Palliative care weighing in on prognosis and symptom management.      #CKD  He has acute on chronic CKD- his baseline Creat was 1, last year.  This admission, Creat 3.5-4.  Unknown interim values.    # Pain  - Pain management f/u

## 2023-09-10 RX ADMIN — OXYCODONE HYDROCHLORIDE 10 MILLIGRAM(S): 5 TABLET ORAL at 19:24

## 2023-09-10 RX ADMIN — PANTOPRAZOLE SODIUM 40 MILLIGRAM(S): 20 TABLET, DELAYED RELEASE ORAL at 06:06

## 2023-09-10 RX ADMIN — Medication 100 MILLIGRAM(S): at 06:05

## 2023-09-10 RX ADMIN — OXYCODONE HYDROCHLORIDE 10 MILLIGRAM(S): 5 TABLET ORAL at 06:06

## 2023-09-10 RX ADMIN — Medication 100 MILLIGRAM(S): at 14:45

## 2023-09-10 RX ADMIN — OXYCODONE HYDROCHLORIDE 10 MILLIGRAM(S): 5 TABLET ORAL at 18:54

## 2023-09-10 RX ADMIN — OXYCODONE HYDROCHLORIDE 10 MILLIGRAM(S): 5 TABLET ORAL at 06:45

## 2023-09-10 RX ADMIN — Medication 100 MILLIGRAM(S): at 21:39

## 2023-09-10 RX ADMIN — Medication 3 MILLIGRAM(S): at 21:38

## 2023-09-10 NOTE — PROGRESS NOTE ADULT - SUBJECTIVE AND OBJECTIVE BOX
CARDIOLOGY  *****************    DATE OF SERVICE: 09-10-23          acetaminophen     Tablet .. 1000 milliGRAM(s) Oral every 8 hours PRN  calamine/zinc oxide Lotion 1 Application(s) Topical two times a day PRN  fentaNYL   Patch  25 MICROgram(s)/Hr 1 Patch Transdermal every 72 hours  lidocaine 2% Jelly 5 milliLiter(s) IntraUrethral every 12 hours PRN  melatonin 3 milliGRAM(s) Oral at bedtime  oxyCODONE    IR 10 milliGRAM(s) Oral every 6 hours PRN  pantoprazole    Tablet 40 milliGRAM(s) Oral before breakfast  phenazopyridine 100 milliGRAM(s) Oral every 8 hours  polyethylene glycol 3350 17 Gram(s) Oral daily  senna 2 Tablet(s) Oral at bedtime          Hemoglobin: 9.5 g/dL (09-06 @ 07:27)            Creatinine Trend: 3.73<--, 4.17<--, 4.37<--, 4.24<--, 3.89<--, 3.96<--    COAGS:           T(C): 37.1 (09-10-23 @ 05:04), Max: 37.1 (09-09-23 @ 20:20)  HR: 105 (09-10-23 @ 05:04) (105 - 109)  BP: 94/60 (09-10-23 @ 05:04) (92/67 - 99/70)  RR: 16 (09-10-23 @ 05:04) (16 - 18)  SpO2: 96% (09-10-23 @ 05:04) (96% - 96%)  Wt(kg): --    I&O's Summary    HEENT:  (-)icterus (-)pallor  CV: N S1 S2 1/6 CAMRYN (+)2 Pulses B/l  Resp:  Clear to ausculatation B/L, normal effort  GI: (+) BS Soft, NT, ND  Lymph:  (-)Edema, (-)obvious lymphadenopathy  Skin: Warm to touch, Normal turgor  Psych: Appropriate mood and affect        ASSESSMENT/PLAN: Mr Carrasco is a pleasant 63y Male metastatic prostate CA here with neutropenic fever and mixed shock picture: septic + cardiogenic.    #CHF  He has newly diagnosed congestive heart failure, with severe cardiomyopathy, EF 15-20%, LV nondilated.  recommend to start low dose CHF beta blockers (Toprol XL 25mg daily). if bP remains stable  Prior to discharge we may consider starting pre-load/after-load reducing drugs.  Long term - Denosumab can cause CHF.  Erleada and Denosumab can also accelerate/destablize coronary artery disease.  Should adjust his Onc plan accordingly.  Palliative care weighing in on prognosis and symptom management.      #CKD  He has acute on chronic CKD- his baseline Creat was 1, last year.  This admission, Creat 3.5-4.  Unknown interim values.    # Pain  - Pain management f/u

## 2023-09-10 NOTE — PROGRESS NOTE ADULT - SUBJECTIVE AND OBJECTIVE BOX
pt seen and examined  9/10/23    MEDICATIONS  (STANDING):  fentaNYL   Patch  25 MICROgram(s)/Hr 1 Patch Transdermal every 72 hours  melatonin 3 milliGRAM(s) Oral at bedtime  pantoprazole    Tablet 40 milliGRAM(s) Oral before breakfast  phenazopyridine 100 milliGRAM(s) Oral every 8 hours  polyethylene glycol 3350 17 Gram(s) Oral daily  senna 2 Tablet(s) Oral at bedtime    MEDICATIONS  (PRN):  acetaminophen     Tablet .. 1000 milliGRAM(s) Oral every 8 hours PRN Moderate Pain (4 - 6)  calamine/zinc oxide Lotion 1 Application(s) Topical two times a day PRN Rash and/or Itching  lidocaine 2% Jelly 5 milliLiter(s) IntraUrethral every 12 hours PRN Pain at koehler site  oxyCODONE    IR 10 milliGRAM(s) Oral every 6 hours PRN Severe Pain (7 - 10)    Vital Signs Last 24 Hrs  T(C): 36.3 (09-10-23 @ 12:30), Max: 37.1 (09-09-23 @ 20:20)  T(F): 97.4 (09-10-23 @ 12:30), Max: 98.8 (09-09-23 @ 20:20)  HR: 107 (09-10-23 @ 12:30) (105 - 109)  BP: 107/75 (09-10-23 @ 12:30) (92/67 - 107/75)  BP(mean): 74 (09-09-23 @ 20:20) (74 - 74)  RR: 16 (09-10-23 @ 12:30) (16 - 18)  SpO2: 95% (09-10-23 @ 12:30) (95% - 96%)        ________________________________________________  PHYSICAL EXAM:  GENERAL: NAD, cachectic, chronically ill appearing   HEENT: Normocephalic;  conjunctivae and sclerae clear; moist mucous membranes;   NECK : supple  CHEST/LUNG: dec breath sounds at bases  HEART: S1 S2  regular; no murmurs, gallops or rubs  ABDOMEN: Soft, Nontender, Nondistended; Bowel sounds present  EXTREMITIES: no cyanosis; no edema; no calf tenderness  SKIN: warm and dry; no rash  NERVOUS SYSTEM:  Awake and alert;   _________________________________________________  LABS:        Creatinine Trend: 3.73 <--, 4.17 <--, 4.37 <--    Urine Studies:  Urinalysis Basic - ( 06 Sep 2023 07:27 )    Color:  / Appearance:  / SG:  / pH:   Gluc: 118 mg/dL / Ketone:   / Bili:  / Urobili:    Blood:  / Protein:  / Nitrite:    Leuk Esterase:  / RBC:  / WBC    Sq Epi:  / Non Sq Epi:  / Bacteria:                       CAPILLARY BLOOD GLUCOSE  RADIOLOGY & ADDITIONAL TESTS:    Imaging  Reviewed:  YES  < from: CT Cervical Spine No Cont (08.29.23 @ 18:05) >    ACC: [id number] EXAM:  CT CERVICAL SPINE   ORDERED BY: DENNY BRITO     ACC: [id number] EXAM:  CT BRAIN   ORDERED BY: DENNY BRITO     PROCEDURE DATE:  08/29/2023          INTERPRETATION:  CLINICAL INDICATION: Fall with head trauma, prostate   cancer    Brain CT:    5mm axial sections of the brain were obtained from base to vertex,   without the intravenous administration of contrast material. Coronal and   sagittal computer generated reconstructed views are available.    No prior brain imaging is available for comparison.    The fourth, third and lateral ventricles are normal size and position.   There is no hemorrhage, mass or shift of the midline structures. There is   normal gray white matter differentiation. Bone window examination is   remarkable for a foci of increased density within the frontal calvarium   bilaterally suggestive of sclerotic calvarial metastases.    Cervical spine CT:    Serial thin sections on a multi slice scanner were obtained through the   cervical spine from the C1 to the level T2 in a stacked axial fashion   reformatted at 1.25 mm sections with sagittal and coronal computer   generated reconstructed views.    There is normal alignment of the vertebral bodies and facet joints.    The vertebral bodies are normal in height. There are multiple foci of   increased density within the vertebral bodies in the C3, C4, C7 and T1   and T2 vertebral bodies and are consistent with sclerotic metastasis. No   paraspinal masses are identified. Mild degenerative uncal vertebral joint   and facet changes are noted. There is mild reversal the normal   straightening of the normal cervical lordosis. No acute fractures or   dislocations are identified.    IMPRESSION:     Brain CT: No intracranial hemorrhage or mass. Calvarial metastases.    Cervical spine CT: No acute fractures or dislocations. Degenerative   changes. Osseous metastases.    --- End of Report ---            KIRT PAK MD; Attending Radiologist  This document has been electronically signed. Aug29 2023  6:10PM    < end of copied text >    Consultant(s) Notes Reviewed:   YES      Plan of care was discussed with patient and /or primary care giver; all questions and concerns were addressed

## 2023-09-11 RX ADMIN — Medication 3 MILLIGRAM(S): at 21:45

## 2023-09-11 RX ADMIN — OXYCODONE HYDROCHLORIDE 10 MILLIGRAM(S): 5 TABLET ORAL at 06:15

## 2023-09-11 RX ADMIN — Medication 100 MILLIGRAM(S): at 05:28

## 2023-09-11 RX ADMIN — Medication 100 MILLIGRAM(S): at 21:45

## 2023-09-11 RX ADMIN — POLYETHYLENE GLYCOL 3350 17 GRAM(S): 17 POWDER, FOR SOLUTION ORAL at 12:03

## 2023-09-11 RX ADMIN — SENNA PLUS 2 TABLET(S): 8.6 TABLET ORAL at 21:45

## 2023-09-11 RX ADMIN — OXYCODONE HYDROCHLORIDE 10 MILLIGRAM(S): 5 TABLET ORAL at 05:29

## 2023-09-11 RX ADMIN — Medication 5 MILLILITER(S): at 14:26

## 2023-09-11 RX ADMIN — Medication 100 MILLIGRAM(S): at 14:26

## 2023-09-11 RX ADMIN — PANTOPRAZOLE SODIUM 40 MILLIGRAM(S): 20 TABLET, DELAYED RELEASE ORAL at 05:28

## 2023-09-11 NOTE — PROGRESS NOTE ADULT - ASSESSMENT
62 yo M pmhx of prostate CA with mets to liver and bone, on Cabazitaxel q3 weeks, presents to the ED for generalized weakness and pain. Follows up with Oncologist Dr. Nasir Gondal. Admitted to ICU for septic shock 2/2  urosepsis vs cardiogenic shock, and febrile neutropenia. Blood cultures neggative, urine culture revealed e.coli 10,000-49,000 Initally started on meropenem and later switched to rocephin 1g qd to complete 7 day course ID Dr. Manrique followed. Hospital course complicated by substernal chest pain 2x trops were elevated, EKG NSR, repeat troponin shows downgrading trops. PERCY shows EF: 15-20% likely chemotherapy induced. Also noted with pancytopenia, patient has been afebrile, his WBC count has been slowly uptrending on filgastrim, received 2 units prbc and 7 units platelets. Also with worsening JOSE ANGEL, and poor PO intake, palliative consulted for GOC-D and family opts for hospice and comfort care only plan for LTC with hospice. SW to follow for placement

## 2023-09-11 NOTE — PROGRESS NOTE ADULT - SUBJECTIVE AND OBJECTIVE BOX
NP Note discussed with  primary attending    Patient is a 63y old  Male who presents with a chief complaint of Septic Shock 2/2 neutropenic fever (10 Sep 2023 11:54)      INTERVAL HPI/OVERNIGHT EVENTS: adequate pain control, except during urination     MEDICATIONS  (STANDING):  fentaNYL   Patch  25 MICROgram(s)/Hr 1 Patch Transdermal every 72 hours  melatonin 3 milliGRAM(s) Oral at bedtime  pantoprazole    Tablet 40 milliGRAM(s) Oral before breakfast  phenazopyridine 100 milliGRAM(s) Oral every 8 hours  polyethylene glycol 3350 17 Gram(s) Oral daily  senna 2 Tablet(s) Oral at bedtime    MEDICATIONS  (PRN):  acetaminophen     Tablet .. 1000 milliGRAM(s) Oral every 8 hours PRN Moderate Pain (4 - 6)  calamine/zinc oxide Lotion 1 Application(s) Topical two times a day PRN Rash and/or Itching  lidocaine 2% Jelly 5 milliLiter(s) IntraUrethral every 12 hours PRN Pain at koehler site  oxyCODONE    IR 10 milliGRAM(s) Oral every 6 hours PRN Severe Pain (7 - 10)      __________________________________________________  REVIEW OF SYSTEMS:    CONSTITUTIONAL: No fever,   EYES: no acute visual disturbances  NECK: No pain or stiffness  RESPIRATORY: No cough; No shortness of breath  CARDIOVASCULAR: No chest pain, no palpitations  GASTROINTESTINAL: No pain. No nausea or vomiting; No diarrhea   NEUROLOGICAL: No headache or numbness, no tremors  MUSCULOSKELETAL: No joint pain, no muscle pain  GENITOURINARY: no dysuria, no frequency, no hesitancy  PSYCHIATRY: no depression , no anxiety  ALL OTHER  ROS negative        Vital Signs Last 24 Hrs  T(C): 36.8 (11 Sep 2023 05:56), Max: 37.1 (10 Sep 2023 20:14)  T(F): 98.2 (11 Sep 2023 05:56), Max: 98.8 (10 Sep 2023 20:14)  HR: 109 (11 Sep 2023 05:56) (107 - 115)  BP: 100/68 (11 Sep 2023 05:56) (91/73 - 107/75)  BP(mean): 77 (10 Sep 2023 20:14) (77 - 77)  RR: 18 (11 Sep 2023 05:56) (16 - 18)  SpO2: 95% (11 Sep 2023 05:56) (95% - 99%)    Parameters below as of 11 Sep 2023 05:56  Patient On (Oxygen Delivery Method): room air    ________________________________________________  PHYSICAL EXAM:  GENERAL: NAD, cachectic, chronically ill appearing   HEENT: Normocephalic;  conjunctivae and sclerae clear; upper lip lesion   NECK : supple  CHEST/LUNG: Clear to auscultation bilaterally with fair air entry   HEART: S1 S2  regular; no murmurs, gallops or rubs  ABDOMEN: Soft, Nontender, Nondistended; Bowel sounds present  EXTREMITIES: multiple areas of eccymosis   SKIN: warm and dry; no rash  NERVOUS SYSTEM:  Awake and alert; Oriented  to person and place; no new deficits    _________________________________________________  LABS:      CAPILLARY BLOOD GLUCOSE    RADIOLOGY & ADDITIONAL TESTS:     < from: CT Cervical Spine No Cont (08.29.23 @ 18:05) >    ACC: [id number] EXAM:  CT CERVICAL SPINE   ORDERED BY: DENNY BRITO     ACC: [id number] EXAM:  CT BRAIN   ORDERED BY: DENNY BRITO     PROCEDURE DATE:  08/29/2023          INTERPRETATION:  CLINICAL INDICATION: Fall with head trauma, prostate   cancer    Brain CT:    5mm axial sections of the brain were obtained from base to vertex,   without the intravenous administration of contrast material. Coronal and   sagittal computer generated reconstructed views are available.    No prior brain imaging is available for comparison.    The fourth, third and lateral ventricles are normal size and position.   There is no hemorrhage, mass or shift of the midline structures. There is   normal gray white matter differentiation. Bone window examination is   remarkable for a foci of increased density within the frontal calvarium   bilaterally suggestive of sclerotic calvarial metastases.    Cervical spine CT:    Serial thin sections on a multi slice scanner were obtained through the   cervical spine from the C1 to the level T2 in a stacked axial fashion   reformatted at 1.25 mm sections with sagittal and coronal computer   generated reconstructed views.    There is normal alignment of the vertebral bodies and facet joints.    The vertebral bodies are normal in height. There are multiple foci of   increased density within the vertebral bodies in the C3, C4, C7 and T1   and T2 vertebral bodies and are consistent with sclerotic metastasis. No   paraspinal masses are identified. Mild degenerative uncal vertebral joint   and facet changes are noted. There is mild reversal the normal   straightening of the normal cervical lordosis. No acute fractures or   dislocations are identified.    IMPRESSION:     Brain CT: No intracranial hemorrhage or mass. Calvarial metastases.    Cervical spine CT: No acute fractures or dislocations. Degenerative   changes. Osseous metastases.    --- End of Report ---      KIRT PAK MD; Attending Radiologist  This document has been electronically signed. Aug29 2023  6:10PM    < end of copied text >      Imaging Personally Reviewed:  YES    Consultant(s) Notes Reviewed:   YES    Plan of care was discussed with patient and /or primary care giver; all questions and concerns were addressed and care was aligned with patient's wishes.

## 2023-09-11 NOTE — PROGRESS NOTE ADULT - SUBJECTIVE AND OBJECTIVE BOX
CARDIOLOGY  *****************  DATE OF SERVICE: 09-11-23    Patient denies chest pain or shortness of breath.   Review of symptoms otherwise negative.    acetaminophen     Tablet .. 1000 milliGRAM(s) Oral every 8 hours PRN  calamine/zinc oxide Lotion 1 Application(s) Topical two times a day PRN  fentaNYL   Patch  25 MICROgram(s)/Hr 1 Patch Transdermal every 72 hours  lidocaine 2% Jelly 5 milliLiter(s) IntraUrethral every 12 hours PRN  melatonin 3 milliGRAM(s) Oral at bedtime  oxyCODONE    IR 10 milliGRAM(s) Oral every 6 hours PRN  pantoprazole    Tablet 40 milliGRAM(s) Oral before breakfast  phenazopyridine 100 milliGRAM(s) Oral every 8 hours  polyethylene glycol 3350 17 Gram(s) Oral daily  senna 2 Tablet(s) Oral at bedtime                    Creatinine Trend: 3.73<--, 4.17<--, 4.37<--, 4.24<--, 3.89<--, 3.96<--    COAGS:           T(C): 36.5 (09-11-23 @ 13:30), Max: 37.1 (09-10-23 @ 20:14)  HR: 106 (09-11-23 @ 13:30) (106 - 115)  BP: 94/65 (09-11-23 @ 13:30) (91/73 - 100/68)  RR: 18 (09-11-23 @ 13:30) (18 - 18)  SpO2: 95% (09-11-23 @ 13:30) (95% - 99%)  Wt(kg): --    I&O's Summary    11 Sep 2023 07:01  -  11 Sep 2023 13:44  --------------------------------------------------------  IN: 0 mL / OUT: 200 mL / NET: -200 mL        HEENT:  (-)icterus (-)pallor  CV: N S1 S2 1/6 CAMRYN (+)2 Pulses B/l  Resp:  Clear to ausculatation B/L, normal effort  GI: (+) BS Soft, NT, ND  Lymph:  (-)Edema, (-)obvious lymphadenopathy  Skin: Warm to touch, Normal turgor  Psych: Appropriate mood and affect        ASSESSMENT/PLAN: Mr Carrasco is a pleasant 63y Male metastatic prostate CA here with neutropenic fever and mixed shock picture: septic + cardiogenic.    #CHF  He has newly diagnosed congestive heart failure, with severe cardiomyopathy, EF 15-20%, LV nondilated.  recommend to start low dose CHF beta blockers (Toprol XL 25mg daily). if bP remains stable  Prior to discharge we may consider starting pre-load/after-load reducing drugs.  Long term - Denosumab can cause CHF.  Erleada and Denosumab can also accelerate/destablize coronary artery disease.  Should adjust his Onc plan accordingly.  Palliative care weighing in on prognosis and symptom management.      #CKD  He has acute on chronic CKD- his baseline Creat was 1, last year.  This admission, Creat 3.5-4.  Unknown interim values.    # Pain  - Pain management f/u     Peter Skelton MD, [facility]  BEEPER [phone]

## 2023-09-11 NOTE — PROGRESS NOTE ADULT - SUBJECTIVE AND OBJECTIVE BOX
Patient is a 63y old  Male who presents with a chief complaint of Septic Shock 2/2 neutropenic fever (9/11/23      INTERVAL HPI/OVERNIGHT EVENTS: pt seen and examined, pts family next to pts bedside    MEDICATIONS  (STANDING):  fentaNYL   Patch  25 MICROgram(s)/Hr 1 Patch Transdermal every 72 hours  melatonin 3 milliGRAM(s) Oral at bedtime  pantoprazole    Tablet 40 milliGRAM(s) Oral before breakfast  phenazopyridine 100 milliGRAM(s) Oral every 8 hours  polyethylene glycol 3350 17 Gram(s) Oral daily  senna 2 Tablet(s) Oral at bedtime    MEDICATIONS  (PRN):  acetaminophen     Tablet .. 1000 milliGRAM(s) Oral every 8 hours PRN Moderate Pain (4 - 6)  calamine/zinc oxide Lotion 1 Application(s) Topical two times a day PRN Rash and/or Itching  lidocaine 2% Jelly 5 milliLiter(s) IntraUrethral every 12 hours PRN Pain at koehler site  oxyCODONE    IR 10 milliGRAM(s) Oral every 6 hours PRN Severe Pain (7 - 10)      __________________________________________________  REVIEW OF SYSTEMS:    CONSTITUTIONAL: No fever,   EYES: no acute visual disturbances  NECK: No pain or stiffness  RESPIRATORY: No cough; No shortness of breath  CARDIOVASCULAR: No chest pain, no palpitations  GASTROINTESTINAL: No pain. No nausea or vomiting; No diarrhea   NEUROLOGICAL: No headache or numbness, no tremors  MUSCULOSKELETAL: No joint pain, no muscle pain  GENITOURINARY: no dysuria, no frequency, no hesitancy  PSYCHIATRY: no depression , no anxiety  ALL OTHER  ROS negative        Vital Signs Last 24 Hrs  T(C): 36.8 (11 Sep 2023 05:56), Max: 37.1 (10 Sep 2023 20:14)  T(F): 98.2 (11 Sep 2023 05:56), Max: 98.8 (10 Sep 2023 20:14)  HR: 109 (11 Sep 2023 05:56) (107 - 115)  BP: 100/68 (11 Sep 2023 05:56) (91/73 - 107/75)  BP(mean): 77 (10 Sep 2023 20:14) (77 - 77)  RR: 18 (11 Sep 2023 05:56) (16 - 18)  SpO2: 95% (11 Sep 2023 05:56) (95% - 99%)    Parameters below as of 11 Sep 2023 05:56  Patient On (Oxygen Delivery Method): room air    ________________________________________________  PHYSICAL EXAM:  GENERAL: NAD, cachectic, chronically ill appearing   HEENT: Normocephalic;  conjunctivae and sclerae clear; upper lip lesion   NECK : supple  CHEST/LUNG: dec breath sounds at bases  HEART: S1 S2  regular; no murmurs, gallops or rubs  ABDOMEN: Soft, Nontender, Nondistended; Bowel sounds present  EXTREMITIES: multiple areas of eccymosis   SKIN: warm and dry; no rash  NERVOUS SYSTEM:  Awake and alert;     _________________________________________________  LABS:      CAPILLARY BLOOD GLUCOSE    RADIOLOGY & ADDITIONAL TESTS:     < from: CT Cervical Spine No Cont (08.29.23 @ 18:05) >    ACC: [id number] EXAM:  CT CERVICAL SPINE   ORDERED BY: DENNY BRITO     ACC: [id number] EXAM:  CT BRAIN   ORDERED BY: DENNY BRITO     PROCEDURE DATE:  08/29/2023          INTERPRETATION:  CLINICAL INDICATION: Fall with head trauma, prostate   cancer    Brain CT:    5mm axial sections of the brain were obtained from base to vertex,   without the intravenous administration of contrast material. Coronal and   sagittal computer generated reconstructed views are available.    No prior brain imaging is available for comparison.    The fourth, third and lateral ventricles are normal size and position.   There is no hemorrhage, mass or shift of the midline structures. There is   normal gray white matter differentiation. Bone window examination is   remarkable for a foci of increased density within the frontal calvarium   bilaterally suggestive of sclerotic calvarial metastases.    Cervical spine CT:    Serial thin sections on a multi slice scanner were obtained through the   cervical spine from the C1 to the level T2 in a stacked axial fashion   reformatted at 1.25 mm sections with sagittal and coronal computer   generated reconstructed views.    There is normal alignment of the vertebral bodies and facet joints.    The vertebral bodies are normal in height. There are multiple foci of   increased density within the vertebral bodies in the C3, C4, C7 and T1   and T2 vertebral bodies and are consistent with sclerotic metastasis. No   paraspinal masses are identified. Mild degenerative uncal vertebral joint   and facet changes are noted. There is mild reversal the normal   straightening of the normal cervical lordosis. No acute fractures or   dislocations are identified.    IMPRESSION:     Brain CT: No intracranial hemorrhage or mass. Calvarial metastases.    Cervical spine CT: No acute fractures or dislocations. Degenerative   changes. Osseous metastases.    --- End of Report ---      KIRT PAK MD; Attending Radiologist  This document has been electronically signed. Aug29 2023  6:10PM    < end of copied text >      Imaging Personally Reviewed:  YES    Consultant(s) Notes Reviewed:   YES    Plan of care was discussed with patient and /or primary care giver; all questions and concerns were addressed and care was aligned with patient's wishes.

## 2023-09-11 NOTE — PROGRESS NOTE ADULT - PROBLEM SELECTOR PLAN 2
now resolved   S/P ICU admission   requiring pressors   urine culture revealed e.coli 10,000-49,000   blood cultures NGTD   s/p meropenem switched to Rocephin   pt now comfort measures and mews exempt  pending LTC w/ hospice, SW following.   ID Dr. Manrique following

## 2023-09-11 NOTE — PROGRESS NOTE ADULT - PROBLEM SELECTOR PLAN 4
WBC count has been slowly uptrending on filgastrim   received 2 units prbc and 7 units platelets  now comfort measures and mews exempt  for further labs   palliative follows

## 2023-09-12 RX ORDER — ONDANSETRON 8 MG/1
4 TABLET, FILM COATED ORAL ONCE
Refills: 0 | Status: COMPLETED | OUTPATIENT
Start: 2023-09-12 | End: 2023-09-12

## 2023-09-12 RX ORDER — OXYCODONE HYDROCHLORIDE 5 MG/1
10 TABLET ORAL EVERY 6 HOURS
Refills: 0 | Status: DISCONTINUED | OUTPATIENT
Start: 2023-09-13 | End: 2023-09-13

## 2023-09-12 RX ADMIN — OXYCODONE HYDROCHLORIDE 10 MILLIGRAM(S): 5 TABLET ORAL at 22:01

## 2023-09-12 RX ADMIN — Medication 3 MILLIGRAM(S): at 21:03

## 2023-09-12 RX ADMIN — Medication 100 MILLIGRAM(S): at 14:22

## 2023-09-12 RX ADMIN — Medication 100 MILLIGRAM(S): at 05:45

## 2023-09-12 RX ADMIN — Medication 100 MILLIGRAM(S): at 21:03

## 2023-09-12 RX ADMIN — OXYCODONE HYDROCHLORIDE 10 MILLIGRAM(S): 5 TABLET ORAL at 15:50

## 2023-09-12 RX ADMIN — ONDANSETRON 4 MILLIGRAM(S): 8 TABLET, FILM COATED ORAL at 06:32

## 2023-09-12 RX ADMIN — OXYCODONE HYDROCHLORIDE 10 MILLIGRAM(S): 5 TABLET ORAL at 21:07

## 2023-09-12 RX ADMIN — OXYCODONE HYDROCHLORIDE 10 MILLIGRAM(S): 5 TABLET ORAL at 15:20

## 2023-09-12 RX ADMIN — PANTOPRAZOLE SODIUM 40 MILLIGRAM(S): 20 TABLET, DELAYED RELEASE ORAL at 05:45

## 2023-09-12 NOTE — PROGRESS NOTE ADULT - SUBJECTIVE AND OBJECTIVE BOX
NP Note discussed with  primary attending    Patient is a 63y old  Male who presents with a chief complaint of Septic Shock 2/2 neutropenic fever (12 Sep 2023 14:26)      INTERVAL HPI/OVERNIGHT EVENTS: no new complaints    MEDICATIONS  (STANDING):  fentaNYL   Patch  25 MICROgram(s)/Hr 1 Patch Transdermal every 72 hours  melatonin 3 milliGRAM(s) Oral at bedtime  pantoprazole    Tablet 40 milliGRAM(s) Oral before breakfast  phenazopyridine 100 milliGRAM(s) Oral every 8 hours  polyethylene glycol 3350 17 Gram(s) Oral daily  senna 2 Tablet(s) Oral at bedtime    MEDICATIONS  (PRN):  acetaminophen     Tablet .. 1000 milliGRAM(s) Oral every 8 hours PRN Moderate Pain (4 - 6)  calamine/zinc oxide Lotion 1 Application(s) Topical two times a day PRN Rash and/or Itching  lidocaine 2% Jelly 5 milliLiter(s) IntraUrethral every 12 hours PRN Pain at koehler site  oxyCODONE    IR 10 milliGRAM(s) Oral every 6 hours PRN Severe Pain (7 - 10)      __________________________________________________  REVIEW OF SYSTEMS:    CONSTITUTIONAL: No fever,   EYES: no acute visual disturbances  NECK: No pain or stiffness  RESPIRATORY: No cough; No shortness of breath  CARDIOVASCULAR: No chest pain, no palpitations  GASTROINTESTINAL: No pain. No nausea or vomiting; No diarrhea   NEUROLOGICAL: No headache or numbness, no tremors  MUSCULOSKELETAL: No joint pain, no muscle pain  GENITOURINARY: no dysuria, no frequency, no hesitancy  PSYCHIATRY: no depression , no anxiety  ALL OTHER  ROS negative        Vital Signs Last 24 Hrs  T(C): 36.4 (12 Sep 2023 13:36), Max: 36.8 (11 Sep 2023 20:42)  T(F): 97.6 (12 Sep 2023 13:36), Max: 98.3 (12 Sep 2023 06:00)  HR: 108 (12 Sep 2023 13:36) (107 - 108)  BP: 94/67 (12 Sep 2023 13:36) (85/60 - 94/67)  BP(mean): 76 (11 Sep 2023 20:42) (76 - 76)  RR: 16 (12 Sep 2023 13:36) (16 - 18)  SpO2: 96% (12 Sep 2023 13:36) (93% - 96%)    Parameters below as of 12 Sep 2023 13:36  Patient On (Oxygen Delivery Method): room air        ________________________________________________  PHYSICAL EXAM:  GENERAL: NAD  HEENT: Normocephalic;  conjunctivae and sclerae clear; moist mucous membranes;   NECK : supple  CHEST/LUNG: Clear to ausculitation bilaterally with good air entry   HEART: S1 S2  regular; no murmurs, gallops or rubs  ABDOMEN: Soft, Nontender, Nondistended; Bowel sounds present  EXTREMITIES: no cyanosis; no edema; no calf tenderness  SKIN: warm and dry; no rash  NERVOUS SYSTEM:  Awake and alert; Oriented  to place, person and time ; no new deficits    _________________________________________________  LABS:              CAPILLARY BLOOD GLUCOSE            RADIOLOGY & ADDITIONAL TESTS:    Imaging Personally Reviewed:  YES/NO    Consultant(s) Notes Reviewed:   YES/ No    Care Discussed with Consultants :     Plan of care was discussed with patient and /or primary care giver; all questions and concerns were addressed and care was aligned with patient's wishes.     NP Note discussed with  primary attending    Patient is a 63y old  Male who presents with a chief complaint of Septic Shock 2/2 neutropenic fever (12 Sep 2023 14:26)      INTERVAL HPI/OVERNIGHT EVENTS: continued dysuria and generalized discomfort     MEDICATIONS  (STANDING):  fentaNYL   Patch  25 MICROgram(s)/Hr 1 Patch Transdermal every 72 hours  melatonin 3 milliGRAM(s) Oral at bedtime  pantoprazole    Tablet 40 milliGRAM(s) Oral before breakfast  phenazopyridine 100 milliGRAM(s) Oral every 8 hours  polyethylene glycol 3350 17 Gram(s) Oral daily  senna 2 Tablet(s) Oral at bedtime    MEDICATIONS  (PRN):  acetaminophen     Tablet .. 1000 milliGRAM(s) Oral every 8 hours PRN Moderate Pain (4 - 6)  calamine/zinc oxide Lotion 1 Application(s) Topical two times a day PRN Rash and/or Itching  lidocaine 2% Jelly 5 milliLiter(s) IntraUrethral every 12 hours PRN Pain at koehler site  oxyCODONE    IR 10 milliGRAM(s) Oral every 6 hours PRN Severe Pain (7 - 10)      __________________________________________________  REVIEW OF SYSTEMS:    CONSTITUTIONAL: No fever,   EYES: no acute visual disturbances  NECK: No pain or stiffness  RESPIRATORY: No cough; No shortness of breath  CARDIOVASCULAR: No chest pain, no palpitations  GASTROINTESTINAL: No pain. No nausea or vomiting; No diarrhea   NEUROLOGICAL: No headache or numbness, no tremors  MUSCULOSKELETAL: No joint pain, no muscle pain  GENITOURINARY: +dysuria, no frequency, no hesitancy  PSYCHIATRY: no depression , no anxiety  ALL OTHER  ROS negative        Vital Signs Last 24 Hrs  T(C): 36.4 (12 Sep 2023 13:36), Max: 36.8 (11 Sep 2023 20:42)  T(F): 97.6 (12 Sep 2023 13:36), Max: 98.3 (12 Sep 2023 06:00)  HR: 108 (12 Sep 2023 13:36) (107 - 108)  BP: 94/67 (12 Sep 2023 13:36) (85/60 - 94/67)  BP(mean): 76 (11 Sep 2023 20:42) (76 - 76)  RR: 16 (12 Sep 2023 13:36) (16 - 18)  SpO2: 96% (12 Sep 2023 13:36) (93% - 96%)    Parameters below as of 12 Sep 2023 13:36  Patient On (Oxygen Delivery Method): room air        ________________________________________________  PHYSICAL EXAM:  GENERAL: NAD, cachectic, chronically ill appearing   HEENT: Normocephalic;  conjunctivae and sclerae clear; upper lip lesion   NECK : supple  CHEST/LUNG: Clear to auscultation bilaterally with fair air entry   HEART: S1 S2  regular; no murmurs, gallops or rubs  ABDOMEN: Soft, Nontender, Nondistended; Bowel sounds present  EXTREMITIES: multiple areas of eccymosis   SKIN: warm and dry; no rash  NERVOUS SYSTEM:  Awake and alert; Oriented  to person and place; no new   _________________________________________________    RADIOLOGY & ADDITIONAL TESTS:     < from: CT Cervical Spine No Cont (08.29.23 @ 18:05) >    ACC: [id number] EXAM:  CT CERVICAL SPINE   ORDERED BY: DENNY BRITO     ACC: [id number] EXAM:  CT BRAIN   ORDERED BY: DENNY BRITO     PROCEDURE DATE:  08/29/2023          INTERPRETATION:  CLINICAL INDICATION: Fall with head trauma, prostate   cancer    Brain CT:    5mm axial sections of the brain were obtained from base to vertex,   without the intravenous administration of contrast material. Coronal and   sagittal computer generated reconstructed views are available.    No prior brain imaging is available for comparison.    The fourth, third and lateral ventricles are normal size and position.   There is no hemorrhage, mass or shift of the midline structures. There is   normal gray white matter differentiation. Bone window examination is   remarkable for a foci of increased density within the frontal calvarium   bilaterally suggestive of sclerotic calvarial metastases.    Cervical spine CT:    Serial thin sections on a multi slice scanner were obtained through the   cervical spine from the C1 to the level T2 in a stacked axial fashion   reformatted at 1.25 mm sections with sagittal and coronal computer   generated reconstructed views.    There is normal alignment of the vertebral bodies and facet joints.    The vertebral bodies are normal in height. There are multiple foci of   increased density within the vertebral bodies in the C3, C4, C7 and T1   and T2 vertebral bodies and are consistent with sclerotic metastasis. No   paraspinal masses are identified. Mild degenerative uncal vertebral joint   and facet changes are noted. There is mild reversal the normal   straightening of the normal cervical lordosis. No acute fractures or   dislocations are identified.    IMPRESSION:     Brain CT: No intracranial hemorrhage or mass. Calvarial metastases.    Cervical spine CT: No acute fractures or dislocations. Degenerative   changes. Osseous metastases.    --- End of Report ---      KIRT PAK MD; Attending Radiologist  This document has been electronically signed. Aug29 2023  6:10PM    < end of copied text >          Plan of care was discussed with patient and /or primary care giver; all questions and concerns were addressed and care was aligned with patient's wishes.

## 2023-09-12 NOTE — PROGRESS NOTE ADULT - PROBLEM SELECTOR PROBLEM 5
SHAILESH FIGUEREDO COMPLETED WITH MOTHER (DARSHAN VELÁSQUEZ) OF PATIENT , PATIENT IS 5YEARS OLD. GAVE MOTHER PREOP INSTRUCTIONS VERBALLY OVER THE PHONE PER ANESTHESIA PROTOCOL. MOTHER VERBALIZED UNDERSTANDING. MOTHER REPORTS THAT PATIENT IS RECOVERING FROM POISON IVY FROM 2 WEEKS AGO BUT NO BROKEN SKIN, JUST SCABS NOW. JOSE ANGEL (acute kidney injury)

## 2023-09-12 NOTE — PROGRESS NOTE ADULT - ASSESSMENT
64 yo M pmhx of prostate CA with mets to liver and bone, on Cabazitaxel q3 weeks, presents to the ED for generalized weakness and pain. Follows up with Oncologist Dr. Nasir Gondal. Admitted to ICU for septic shock 2/2  urosepsis vs cardiogenic shock, and febrile neutropenia. Blood cultures neggative, urine culture revealed e.coli 10,000-49,000 Initally started on meropenem and later switched to rocephin 1g qd to complete 7 day course ID Dr. Manrique followed. Hospital course complicated by substernal chest pain 2x trops were elevated, EKG NSR, repeat troponin shows downgrading trops. PERCY shows EF: 15-20% likely chemotherapy induced. Also noted with pancytopenia, patient has been afebrile, his WBC count has been slowly uptrending on filgastrim, received 2 units prbc and 7 units platelets. Also with worsening JOSE ANGEL, and poor PO intake, palliative consulted for GOC-D and family opts for hospice and comfort care only plan for LTC with hospice. SW to follow for placement

## 2023-09-12 NOTE — CHART NOTE - NSCHARTNOTEFT_GEN_A_CORE
Assessment:     Factors impacting intake: [ ] none [ ] nausea  [ ] vomiting [ ] diarrhea [ ] constipation  [ ]chewing problems [ ] swallowing issues  [ ] other:     Diet Presciption: Diet, Regular:   Supplement Feeding Modality:  Oral  Ensure Enlive Cans or Servings Per Day:  1       Frequency:  Two Times a day (23 @ 08:37)    Intake:     Daily Weight in k.9 (09 Sep 2023 06:59)  Weight in k.7 (07 Sep 2023 05:35)  Weight in k.3 (06 Sep 2023 05:25)    % Weight Change    Pertinent Medications: MEDICATIONS  (STANDING):  fentaNYL   Patch  25 MICROgram(s)/Hr 1 Patch Transdermal every 72 hours  melatonin 3 milliGRAM(s) Oral at bedtime  pantoprazole    Tablet 40 milliGRAM(s) Oral before breakfast  phenazopyridine 100 milliGRAM(s) Oral every 8 hours  polyethylene glycol 3350 17 Gram(s) Oral daily  senna 2 Tablet(s) Oral at bedtime    MEDICATIONS  (PRN):  acetaminophen     Tablet .. 1000 milliGRAM(s) Oral every 8 hours PRN Moderate Pain (4 - 6)  calamine/zinc oxide Lotion 1 Application(s) Topical two times a day PRN Rash and/or Itching  lidocaine 2% Jelly 5 milliLiter(s) IntraUrethral every 12 hours PRN Pain at koehler site  oxyCODONE    IR 10 milliGRAM(s) Oral every 6 hours PRN Severe Pain (7 - 10)    Pertinent Labs:   Alb 2.3 g/dL<L>     CAPILLARY BLOOD GLUCOSE          Skin:     Estimated Needs:   [ ] no change since previous assessment  [ ] recalculated:     Previous Nutrition Diagnosis:   [ ] Inadequate Energy Intake [ ]Inadequate Oral Intake [ ] Excessive Energy Intake   [ ] Underweight [ ] Increased Nutrient Needs [ ] Overweight/Obesity  [ ] Swallowing Difficult   [ ] Altered GI Function [ ] Unintended Weight Loss [ ] Food & Nutrition Related Knowledge Deficit [ ] Malnutrition   [ ] Not Ready for Diet/Life Style Changes     Nutrition Diagnosis is [ ] ongoing  [ ] Improving   [ ] resolved [ ] not applicable     New Nutrition Diagnosis: [ ] not applicable       Interventions:   Recommend  [ ] Change Diet To:  [ ] Nutrition Supplement  [ ] Nutrition Support  [ ] Other:     Monitoring and Evaluation:   [ ] PO intake [ x ] Tolerance to diet prescription [ x ] weights [ x ] labs[ x ] follow up per protocol  [ ] other: Assessment:       Nutrition reassessment for follow-up. Chart reviewed, pt asleep when visited today, spoke to wife at bedside, breakfast untouched, vomiting episode last night per family, some food choices updated and forwarded to Dietary, poor oral intake, not taking Ensure drinks, 'too sweet" per pt according to wife, prefer to continue Regular food as tolerated; On Comfort Measure, for LTC placement with hospice per team    Factors impacting intake: [ x ] difficulty with food procurement/preparation; persistent lack of appetite; Confucianist/ethnic/cultural/personal food preferences; acute on chronic comorbidities including JOSE ANGEL, septic shock, metastatic cancer s/p chemotherapy    Diet Prescription:   Diet, Regular:   Supplement Feeding Modality:  Oral  Ensure Enlive Cans or Servings Per Day:  1       Frequency:  Two Times a day (23 @ 08:37)    Intake: see above     Daily Weight in k.9 (09 Sep 2023 06:59)  Weight in k.7 (07 Sep 2023 05:35)  Weight in k.3 (06 Sep 2023 05:25)    % Weight Change: downward trend; 1+ generalized edema      Pertinent Medications: MEDICATIONS  (STANDING):  fentaNYL   Patch  25 MICROgram(s)/Hr 1 Patch Transdermal every 72 hours  melatonin 3 milliGRAM(s) Oral at bedtime  pantoprazole    Tablet 40 milliGRAM(s) Oral before breakfast  phenazopyridine 100 milliGRAM(s) Oral every 8 hours  polyethylene glycol 3350 17 Gram(s) Oral daily  senna 2 Tablet(s) Oral at bedtime    MEDICATIONS  (PRN):  acetaminophen     Tablet .. 1000 milliGRAM(s) Oral every 8 hours PRN Moderate Pain (4 - 6)  calamine/zinc oxide Lotion 1 Application(s) Topical two times a day PRN Rash and/or Itching  lidocaine 2% Jelly 5 milliLiter(s) IntraUrethral every 12 hours PRN Pain at koehler site  oxyCODONE    IR 10 milliGRAM(s) Oral every 6 hours PRN Severe Pain (7 - 10)    Pertinent Labs:   Alb 2.3 g/dL<L>     CAPILLARY BLOOD GLUCOSE    Skin: skin intact     Estimated Needs:   [ x ] no change since previous assessment  [ ] recalculated:     Previous Nutrition Diagnosis: [ x ] Severe Malnutrition     Nutrition Diagnosis is [ x ] ongoing  [ ] Improving   [ ] resolved [ ] not applicable     New Nutrition Diagnosis: [ x ] not applicable     Interventions/Recommend:   [ x ] Change diet to Regular diet, pt refusing Ensure drinks   [ ] Nutrition Supplement  [ ] Nutrition Support  [ x ] Other: Provide food choices within diet Rx as available/updated                   Nursing to continue feeding assistance and encouragement    Monitoring and Evaluation:   [ x ] PO intake [ x ] Tolerance to diet prescription [ x ] weights [ x ] labs[ x ] follow up per protocol  [ ] other:

## 2023-09-12 NOTE — PROGRESS NOTE ADULT - SUBJECTIVE AND OBJECTIVE BOX
Patient is a 63y old  Male who presents with a chief complaint of Septic Shock 2/2 neutropenic fever (12 Sep 2023       INTERVAL HPI/OVERNIGHT EVENTS: pt seen and examined, dneies dysuria to writer    MEDICATIONS  (STANDING):  fentaNYL   Patch  25 MICROgram(s)/Hr 1 Patch Transdermal every 72 hours  melatonin 3 milliGRAM(s) Oral at bedtime  pantoprazole    Tablet 40 milliGRAM(s) Oral before breakfast  phenazopyridine 100 milliGRAM(s) Oral every 8 hours  polyethylene glycol 3350 17 Gram(s) Oral daily  senna 2 Tablet(s) Oral at bedtime    MEDICATIONS  (PRN):  acetaminophen     Tablet .. 1000 milliGRAM(s) Oral every 8 hours PRN Moderate Pain (4 - 6)  calamine/zinc oxide Lotion 1 Application(s) Topical two times a day PRN Rash and/or Itching  lidocaine 2% Jelly 5 milliLiter(s) IntraUrethral every 12 hours PRN Pain at koehler site  oxyCODONE    IR 10 milliGRAM(s) Oral every 6 hours PRN Severe Pain (7 - 10)      __________________________________________________  REVIEW OF SYSTEMS:    CONSTITUTIONAL: No fever,   EYES: no acute visual disturbances  NECK: No pain or stiffness  RESPIRATORY: No cough; No shortness of breath  CARDIOVASCULAR: No chest pain, no palpitations  GASTROINTESTINAL: No pain. No nausea or vomiting; No diarrhea   NEUROLOGICAL: No headache or numbness, no tremors  MUSCULOSKELETAL: No joint pain, no muscle pain  GENITOURINARY: no dysuria, no frequency, no hesitancy  PSYCHIATRY: no depression , no anxiety  ALL OTHER  ROS negative        Vital Signs Last 24 Hrs  T(C): 36.4 (12 Sep 2023 13:36), Max: 36.8 (11 Sep 2023 20:42)  T(F): 97.6 (12 Sep 2023 13:36), Max: 98.3 (12 Sep 2023 06:00)  HR: 108 (12 Sep 2023 13:36) (107 - 108)  BP: 94/67 (12 Sep 2023 13:36) (85/60 - 94/67)  BP(mean): 76 (11 Sep 2023 20:42) (76 - 76)  RR: 16 (12 Sep 2023 13:36) (16 - 18)  SpO2: 96% (12 Sep 2023 13:36) (93% - 96%)    Parameters below as of 12 Sep 2023 13:36  Patient On (Oxygen Delivery Method): room air        ________________________________________________  PHYSICAL EXAM:  GENERAL: NAD, cachectic, chronically ill appearing   HEENT: Normocephalic;  conjunctivae and sclerae clear; upper lip lesion   NECK : supple  CHEST/LUNG: dec breath sounds at bases  HEART: S1 S2  regular; no murmurs, gallops or rubs  ABDOMEN: Soft, Nontender, Nondistended; Bowel sounds present  EXTREMITIES: multiple areas of eccymosis   SKIN: warm and dry; no rash  NERVOUS SYSTEM:  Awake and alert;=  _________________________________________________    RADIOLOGY & ADDITIONAL TESTS:     < from: CT Cervical Spine No Cont (08.29.23 @ 18:05) >    ACC: [id number] EXAM:  CT CERVICAL SPINE   ORDERED BY: DENNY BRITO     ACC: [id number] EXAM:  CT BRAIN   ORDERED BY: DENNY BRITO     PROCEDURE DATE:  08/29/2023          INTERPRETATION:  CLINICAL INDICATION: Fall with head trauma, prostate   cancer    Brain CT:    5mm axial sections of the brain were obtained from base to vertex,   without the intravenous administration of contrast material. Coronal and   sagittal computer generated reconstructed views are available.    No prior brain imaging is available for comparison.    The fourth, third and lateral ventricles are normal size and position.   There is no hemorrhage, mass or shift of the midline structures. There is   normal gray white matter differentiation. Bone window examination is   remarkable for a foci of increased density within the frontal calvarium   bilaterally suggestive of sclerotic calvarial metastases.    Cervical spine CT:    Serial thin sections on a multi slice scanner were obtained through the   cervical spine from the C1 to the level T2 in a stacked axial fashion   reformatted at 1.25 mm sections with sagittal and coronal computer   generated reconstructed views.    There is normal alignment of the vertebral bodies and facet joints.    The vertebral bodies are normal in height. There are multiple foci of   increased density within the vertebral bodies in the C3, C4, C7 and T1   and T2 vertebral bodies and are consistent with sclerotic metastasis. No   paraspinal masses are identified. Mild degenerative uncal vertebral joint   and facet changes are noted. There is mild reversal the normal   straightening of the normal cervical lordosis. No acute fractures or   dislocations are identified.    IMPRESSION:     Brain CT: No intracranial hemorrhage or mass. Calvarial metastases.    Cervical spine CT: No acute fractures or dislocations. Degenerative   changes. Osseous metastases.    --- End of Report ---      KIRT PAK MD; Attending Radiologist  This document has been electronically signed. Aug29 2023  6:10PM    < end of copied text >          Plan of care was discussed with patient and /or primary care giver; all questions and concerns were addressed and care was aligned with patient's wishes.

## 2023-09-12 NOTE — PROGRESS NOTE ADULT - SUBJECTIVE AND OBJECTIVE BOX
CARDIOLOGY  *****************    DATE OF SERVICE: 09-12-23    Patient denies chest pain or shortness of breath.   Review of symptoms otherwise negative.    acetaminophen     Tablet .. 1000 milliGRAM(s) Oral every 8 hours PRN  calamine/zinc oxide Lotion 1 Application(s) Topical two times a day PRN  fentaNYL   Patch  25 MICROgram(s)/Hr 1 Patch Transdermal every 72 hours  lidocaine 2% Jelly 5 milliLiter(s) IntraUrethral every 12 hours PRN  melatonin 3 milliGRAM(s) Oral at bedtime  oxyCODONE    IR 10 milliGRAM(s) Oral every 6 hours PRN  pantoprazole    Tablet 40 milliGRAM(s) Oral before breakfast  phenazopyridine 100 milliGRAM(s) Oral every 8 hours  polyethylene glycol 3350 17 Gram(s) Oral daily  senna 2 Tablet(s) Oral at bedtime                    Creatinine Trend: 3.73<--, 4.17<--, 4.37<--, 4.24<--, 3.89<--, 3.96<--    COAGS:           T(C): 36.4 (09-12-23 @ 13:36), Max: 36.8 (09-11-23 @ 20:42)  HR: 108 (09-12-23 @ 13:36) (107 - 108)  BP: 94/67 (09-12-23 @ 13:36) (85/60 - 94/67)  RR: 16 (09-12-23 @ 13:36) (16 - 18)  SpO2: 96% (09-12-23 @ 13:36) (93% - 96%)  Wt(kg): --    I&O's Summary    11 Sep 2023 07:01  -  12 Sep 2023 07:00  --------------------------------------------------------  IN: 0 mL / OUT: 200 mL / NET: -200 mL        HEENT:  (-)icterus (-)pallor  CV: N S1 S2 1/6 CAMRYN (+)2 Pulses B/l  Resp:  Clear to ausculatation B/L, normal effort  GI: (+) BS Soft, NT, ND  Lymph:  (-)Edema, (-)obvious lymphadenopathy  Skin: Warm to touch, Normal turgor  Psych: Appropriate mood and affect        ASSESSMENT/PLAN: Mr Carrasco is a pleasant 63y Male metastatic prostate CA here with neutropenic fever and mixed shock picture: septic + cardiogenic.    #CHF  He has newly diagnosed congestive heart failure, with severe cardiomyopathy, EF 15-20%, LV nondilated.  recommend to start low dose CHF beta blockers (Toprol XL 25mg daily). if bP remains stable  Prior to discharge we may consider starting pre-load/after-load reducing drugs. but his BP remains low   Long term - Denosumab can cause CHF.  Erleada and Denosumab can also accelerate/destablize coronary artery disease.  Should adjust his Onc plan accordingly.  Palliative care weighing in on prognosis and symptom management.      #CKD  He has acute on chronic CKD- his baseline Creat was 1, last year.  This admission, Creat 3.5-4.  Unknown interim values.    # Pain  - Pain management f/u     Peter Skelton MD, [facility]  BEEPER [phone]

## 2023-09-13 DIAGNOSIS — M54.9 DORSALGIA, UNSPECIFIED: ICD-10-CM

## 2023-09-13 PROCEDURE — 99232 SBSQ HOSP IP/OBS MODERATE 35: CPT

## 2023-09-13 RX ORDER — OXYCODONE HYDROCHLORIDE 5 MG/1
10 TABLET ORAL EVERY 4 HOURS
Refills: 0 | Status: DISCONTINUED | OUTPATIENT
Start: 2023-09-13 | End: 2023-09-15

## 2023-09-13 RX ORDER — POLYETHYLENE GLYCOL 3350 17 G/17G
17 POWDER, FOR SOLUTION ORAL
Refills: 0 | Status: DISCONTINUED | OUTPATIENT
Start: 2023-09-13 | End: 2023-09-15

## 2023-09-13 RX ADMIN — OXYCODONE HYDROCHLORIDE 10 MILLIGRAM(S): 5 TABLET ORAL at 05:16

## 2023-09-13 RX ADMIN — OXYCODONE HYDROCHLORIDE 10 MILLIGRAM(S): 5 TABLET ORAL at 06:18

## 2023-09-13 RX ADMIN — POLYETHYLENE GLYCOL 3350 17 GRAM(S): 17 POWDER, FOR SOLUTION ORAL at 12:59

## 2023-09-13 RX ADMIN — OXYCODONE HYDROCHLORIDE 10 MILLIGRAM(S): 5 TABLET ORAL at 20:15

## 2023-09-13 RX ADMIN — Medication 100 MILLIGRAM(S): at 05:16

## 2023-09-13 RX ADMIN — Medication 5 MILLIGRAM(S): at 12:59

## 2023-09-13 RX ADMIN — OXYCODONE HYDROCHLORIDE 10 MILLIGRAM(S): 5 TABLET ORAL at 19:34

## 2023-09-13 RX ADMIN — Medication 100 MILLIGRAM(S): at 21:43

## 2023-09-13 RX ADMIN — Medication 3 MILLIGRAM(S): at 21:53

## 2023-09-13 RX ADMIN — PANTOPRAZOLE SODIUM 40 MILLIGRAM(S): 20 TABLET, DELAYED RELEASE ORAL at 05:16

## 2023-09-13 RX ADMIN — Medication 100 MILLIGRAM(S): at 13:14

## 2023-09-13 NOTE — PROGRESS NOTE ADULT - PROBLEM SELECTOR PLAN 2
Mild pain   - Non-pharmacological pain treatment recommendations  - Warm/ Cool packs PRN   - Repositioning extremity, elevation, imagery, relaxation, distraction.  - Physical therapy OOB if no contraindications   Recommendations discussed with primary team and RN.  Plan for LTC with hospice. MEWs excempt. Comfort measures.

## 2023-09-13 NOTE — PROGRESS NOTE ADULT - SUBJECTIVE AND OBJECTIVE BOX
CARDIOLOGY  *****************    DATE OF SERVICE: 09-13-23    Patient denies chest pain or shortness of breath.  still with scrotal pain  Review of symptoms otherwise negative.    acetaminophen     Tablet .. 1000 milliGRAM(s) Oral every 8 hours PRN  bisacodyl 5 milliGRAM(s) Oral every 12 hours PRN  calamine/zinc oxide Lotion 1 Application(s) Topical two times a day PRN  fentaNYL   Patch  25 MICROgram(s)/Hr 1 Patch Transdermal every 72 hours  lidocaine 2% Jelly 5 milliLiter(s) IntraUrethral every 12 hours PRN  melatonin 3 milliGRAM(s) Oral at bedtime  oxyCODONE    IR 10 milliGRAM(s) Oral every 4 hours PRN  pantoprazole    Tablet 40 milliGRAM(s) Oral before breakfast  phenazopyridine 100 milliGRAM(s) Oral every 8 hours  polyethylene glycol 3350 17 Gram(s) Oral two times a day  senna 2 Tablet(s) Oral at bedtime      Creatinine Trend: 3.73<--, 4.17<--, 4.37<--, 4.24<--, 3.89<--, 3.96<--    COAGS:       T(C): 36.6 (09-13-23 @ 06:08), Max: 36.6 (09-12-23 @ 20:40)  HR: 104 (09-13-23 @ 06:08) (104 - 108)  BP: 101/71 (09-13-23 @ 06:08) (92/61 - 101/71)  RR: 18 (09-13-23 @ 06:08) (16 - 18)  SpO2: 95% (09-13-23 @ 06:08) (95% - 96%)  Wt(kg): --    I&O's Summary        HEENT:  (-)icterus (-)pallor  CV: N S1 S2 1/6 CAMRYN (+)2 Pulses B/l  Resp:  Clear to ausculatation B/L, normal effort  GI: (+) BS Soft, NT, ND  Lymph:  (-)Edema, (-)obvious lymphadenopathy  Skin: Warm to touch, Normal turgor  Psych: Appropriate mood and affect        ASSESSMENT/PLAN: Mr Carrasco is a pleasant 63y Male metastatic prostate CA here with neutropenic fever and mixed shock picture: septic + cardiogenic.    #CHF  He has newly diagnosed congestive heart failure, with severe cardiomyopathy, EF 15-20%, LV nondilated.  recommend to start low dose CHF beta blockers (Toprol XL 25mg daily). if bP remains stable  Prior to discharge we may consider starting pre-load/after-load reducing drugs. but his BP remains low   Long term - Denosumab can cause CHF.  Erleada and Denosumab can also accelerate/destablize coronary artery disease.  Should adjust his Onc plan accordingly.  Palliative care weighing in on prognosis and symptom management.      #CKD  He has acute on chronic CKD- his baseline Creat was 1, last year.  This admission, Creat 3.5-4.  Unknown interim values.    # Pain  - Pain management f/u     Peter Skelton MD, [facility]  BEEPER [phone]   CARDIOLOGY  *****************    DATE OF SERVICE: 09-13-23    Patient denies chest pain or shortness of breath.  still with scrotal pain  Review of symptoms otherwise negative.    acetaminophen     Tablet .. 1000 milliGRAM(s) Oral every 8 hours PRN  bisacodyl 5 milliGRAM(s) Oral every 12 hours PRN  calamine/zinc oxide Lotion 1 Application(s) Topical two times a day PRN  fentaNYL   Patch  25 MICROgram(s)/Hr 1 Patch Transdermal every 72 hours  lidocaine 2% Jelly 5 milliLiter(s) IntraUrethral every 12 hours PRN  melatonin 3 milliGRAM(s) Oral at bedtime  oxyCODONE    IR 10 milliGRAM(s) Oral every 4 hours PRN  pantoprazole    Tablet 40 milliGRAM(s) Oral before breakfast  phenazopyridine 100 milliGRAM(s) Oral every 8 hours  polyethylene glycol 3350 17 Gram(s) Oral two times a day  senna 2 Tablet(s) Oral at bedtime      Creatinine Trend: 3.73<--, 4.17<--, 4.37<--, 4.24<--, 3.89<--, 3.96<--    COAGS:       T(C): 36.6 (09-13-23 @ 06:08), Max: 36.6 (09-12-23 @ 20:40)  HR: 104 (09-13-23 @ 06:08) (104 - 108)  BP: 101/71 (09-13-23 @ 06:08) (92/61 - 101/71)  RR: 18 (09-13-23 @ 06:08) (16 - 18)  SpO2: 95% (09-13-23 @ 06:08) (95% - 96%)  Wt(kg): --    I&O's Summary        HEENT:  (-)icterus (-)pallor  CV: N S1 S2 1/6 CAMRYN (+)2 Pulses B/l  Resp:  Clear to ausculatation B/L, normal effort  GI: (+) BS Soft, NT, ND  Lymph:  (-)Edema, (-)obvious lymphadenopathy  Skin: Warm to touch, Normal turgor  Psych: Appropriate mood and affect        ASSESSMENT/PLAN: Mr Carrasco is a pleasant 63y Male metastatic prostate CA here with neutropenic fever and mixed shock picture: septic + cardiogenic.    #CHF  He has newly diagnosed congestive heart failure, with severe cardiomyopathy, EF 15-20%, LV nondilated.      #CKD  He has acute on chronic CKD- his baseline Creat was 1, last year.  This admission, Creat 3.5-4.  Unknown interim values.    # Pain  - Pain management f/u  - appears pt and family have opted fro hospice and comfort care   - I will sign off please call back if needed      Peter Skelton MD, [facility]  BEEPER [phone]

## 2023-09-13 NOTE — PROGRESS NOTE ADULT - PROBLEM SELECTOR PLAN 3
congestive heart failure, with severe cardiomyopathy, EF 15-20%, LV nondilated  likely chemo induced   cardiology Dr. Skelton following  comfort measure only

## 2023-09-13 NOTE — PROGRESS NOTE ADULT - SUBJECTIVE AND OBJECTIVE BOX
Source of information: DHAVAL BIRD, Chart review, spouse and daughter  Patient language: English  : n/a    HPI:  This is a 62 yo M with pmhx of prostate CA with mets to liver and bone, on Cabazitaxel q3 weeks, presents to the ED for generalized weakness and pain. Per wife, who is at bedside, states patient had an episode of loss of consciousness and fall, with head trauma. Patient also mentions having dysuria since yesterday. patient started this regimen in march, and this is the first time he has had such symptoms. He follows up with Oncologist Dr. Nasir Gondal. He received his last chemo treatment 1 week ago.  (30 Aug 2023 01:34)    Patient admitted to ICU for septic shock 2/2 neutropenic fever and urosepsis. Patient downgraded to the medicine floor on 9/2/23. Urine culture positive for E.coli on Rocephin 1g q 24hrs x 7 days. Pain consulted on 9/2 for chest and throat pain. Pt seen and examined at bedside this morning, wife present. Pt laying in bed, fatigued, closing eyes frequently during conversation. Pt denies pain at this time. Reports current pain regimen is effectively managing his pain. Refusing to increase fentanyl patch at this time. Wife requesting to increase frequency of prn oxycodone. Pt reports intermittent suprapubic pain and lower back pain. Also reports dysuria. Pt is tolerating PO diet. Pt denies chest pain, SOB, nausea, vomiting. Wife reports constipation, last BM 9/6. Patient stated goal for pain control: to be able to take deep breaths, get out of bed to chair and ambulate with tolerable pain control. Per ISTOP pt was on Fentanyl patch 75 mcg/hr at home and Percocet 10-325mg PO q6h PRN. Plan for LTC with hospice. MEWs excempt. Comfort measures.     PAST MEDICAL & SURGICAL HISTORY:    CA of prostate    Enlarged prostate with lower urinary tract symptoms (LUTS)    No significant past surgical history    FAMILY HISTORY:    Family history of hypertension (Mother)    Social History:   [X]Denies ETOH use, illicit drug use, and smoking     Allergies    No Known Allergies    MEDICATIONS  (STANDING):  fentaNYL   Patch  25 MICROgram(s)/Hr 1 Patch Transdermal every 72 hours  melatonin 3 milliGRAM(s) Oral at bedtime  pantoprazole    Tablet 40 milliGRAM(s) Oral before breakfast  phenazopyridine 100 milliGRAM(s) Oral every 8 hours  polyethylene glycol 3350 17 Gram(s) Oral two times a day  senna 2 Tablet(s) Oral at bedtime    MEDICATIONS  (PRN):  acetaminophen     Tablet .. 1000 milliGRAM(s) Oral every 8 hours PRN Moderate Pain (4 - 6)  bisacodyl 5 milliGRAM(s) Oral every 12 hours PRN Constipation  calamine/zinc oxide Lotion 1 Application(s) Topical two times a day PRN Rash and/or Itching  lidocaine 2% Jelly 5 milliLiter(s) IntraUrethral every 12 hours PRN Pain at koehler site  oxyCODONE    IR 10 milliGRAM(s) Oral every 4 hours PRN Severe Pain (7 - 10)      Vital Signs Last 24 Hrs  T(C): 36.6 (13 Sep 2023 14:17), Max: 36.6 (12 Sep 2023 20:40)  T(F): 97.9 (13 Sep 2023 14:17), Max: 97.9 (12 Sep 2023 20:40)  HR: 103 (13 Sep 2023 14:17) (103 - 107)  BP: 90/66 (13 Sep 2023 14:17) (90/66 - 101/71)  BP(mean): 71 (13 Sep 2023 14:17) (71 - 71)  RR: 18 (13 Sep 2023 14:17) (18 - 18)  SpO2: 98% (13 Sep 2023 14:17) (95% - 98%)    Parameters below as of 13 Sep 2023 14:17  Patient On (Oxygen Delivery Method): room air      COVID-19 PCR: NotDetec (07 Sep 2023 18:10)  SARS-CoV-2: NotDetec (30 Aug 2023 09:30)    LABS: Reviewed    Radiology: Reviewed    < from: US Renal (08.31.23 @ 14:16) >    ACC: [id number] EXAM:  US KIDNEY(S)   ORDERED BY: HIPOLITO ROSALES     ACC: [id number] EXAM:  US URINARY BLADDER   ORDERED BY: LULU COPPOLA     PROCEDURE DATE:  08/31/2023      INTERPRETATION:  CLINICAL INFORMATION: Urinary retention, acute kidney   injury    COMPARISON: CT abdomen pelvis dated 8/29/2023    TECHNIQUE: Sonography of the kidneys and bladder.    FINDINGS:    There is bilateral increased renal cortical echogenicity.    Right kidney measures 12.7 cm in length. No hydronephrosis or shadowing   stone.    Left kidney measures 11.9 cm in length. There is mild hydronephrosis.    Urinary bladder is incompletely collapsed around a Koehler catheter.    IMPRESSION:  Increased renal cortical echogenicity compatible with medical renal   disease.    Mild left hydronephrosis.    Urinary bladder is incompletely collapsed around a Koehler catheter.    --- End of Report ---    ISAIAS KELLEY MD; Attending Radiologist  This document has been electronically signed. Aug 31 2023  2:39PM    < end of copied text >    < from: CT Cervical Spine No Cont (08.29.23 @ 18:05) >    ACC: [id number] EXAM:  CT CERVICAL SPINE   ORDERED BY: DENNY BRITO   ACC: [id number] EXAM:  CT BRAIN   ORDERED BY: DENNY BRITO     PROCEDURE DATE:  08/29/2023      INTERPRETATION:  CLINICAL INDICATION: Fall with head trauma, prostate   cancer    Brain CT:    5mm axial sections of the brain were obtained from base to vertex,   without the intravenous administration of contrast material. Coronal and   sagittal computer generated reconstructed views are available.    No prior brain imaging is available for comparison.    The fourth, third and lateral ventricles are normal size and position.   There is no hemorrhage, mass or shift of the midline structures. There is   normal gray white matter differentiation. Bone window examination is   remarkable for a foci of increased density within the frontal calvarium   bilaterally suggestive of sclerotic calvarial metastases.    Cervical spine CT:    Serial thin sections on a multi slice scanner were obtained through the   cervical spine from the C1 to the level T2 in a stacked axial fashion   reformatted at 1.25 mm sections with sagittal and coronal computer   generated reconstructed views.    There is normal alignment of the vertebral bodies and facet joints.    The vertebral bodies are normal in height. There are multiple foci of   increased density within the vertebral bodies in the C3, C4, C7 and T1   and T2 vertebral bodies and are consistent with sclerotic metastasis. No   paraspinal masses are identified. Mild degenerative uncal vertebral joint   and facet changes are noted. There is mild reversal the normal   straightening of the normal cervical lordosis. No acute fractures or   dislocations are identified.    IMPRESSION:     Brain CT: No intracranial hemorrhage or mass. Calvarial metastases.    Cervical spine CT: No acute fractures or dislocations. Degenerative   changes. Osseous metastases.    --- End of Report ---      KIRT PAK MD; Attending Radiologist  This document has been electronically signed. Aug29 2023  6:10PM    < end of copied text >      ORT Score -   Family Hx of substance abuse	Female	      Male  Alcohol 	                                           1                     3  Illegal drugs	                                   2                     3  Rx drugs                                           4 	                  4  Personal Hx of substance abuse		  Alcohol 	                                          3	                  3  Illegal drugs                                     4	                  4  Rx drugs                                            5 	                  5  Age between 16- 45 years	           1                     1  hx preadolescent sexual abuse	   3 	                  0  Psychological disease		  ADD, OCD, bipolar, schizophrenia   2	          2  Depression                                           1 	          1  Total: 0    a score of 3 or lower indicates low risk for opioid abuse		  a score of 4-7 indicates moderate risk for opioid abuse		  a score of 8 or higher indicates high risk for opioid abuse    REVIEW OF SYSTEMS:  CONSTITUTIONAL: No fever + fatigue  HEENT:  No difficulty hearing, no change in vision  NECK: No pain or stiffness  RESPIRATORY: + cough, no shortness of breath  CARDIOVASCULAR: denies chest pain, no palpitations, dizziness  GASTROINTESTINAL: + loss of appetite, No abdominal or epigastric pain. No nausea, vomiting; + constipation last BM (9/6)  GENITOURINARY: + suprapubic pain + dysuria  MUSCULOSKELETAL: + back pain; + occasional leg pain. no upper or lower motor strength weakness, no saddle anesthesia, + episode of bowel incontinence (9/6), no falls   NEURO: No headaches, No numbness/tingling b/l LE, No weakness    PHYSICAL EXAM:  GENERAL: + fatigue, & Oriented X3, cooperative, NAD, closes eyes during conversation. Speech is clear, soft.   RESPIRATORY: Respirations even and unlabored. Clear to auscultation bilaterally; No rales, rhonchi, wheezing, or rubs  CARDIOVASCULAR: Normal S1/S2, regular rate and rhythm; No murmurs, rubs, or gallops. No JVD.   GASTROINTESTINAL:  Soft, Nontender, Nondistended; Bowel sounds present  GENITOURINARY: + suprapubic pain with palpation  PERIPHERAL VASCULAR:  Extremities warm without edema. 2+ Peripheral Pulses, No cyanosis, No calf tenderness  MUSCULOSKELETAL: Motor Strength 4/5 B/L upper and lower extremities; ROM intact; negative SLR; + lumbar back tenderness on palpation.   SKIN: Warm, dry, intact. No rashes, lesions, scars or wounds. + right upper arm ecchymosis  + right upper arm fentanyl patch 25mcg    Risk factors associated with adverse outcomes related to opioid treatment  [ ] Concurrent benzodiazepine use  [ ] History/ Active substance use or alcohol use disorder  [ ] Psychiatric co-morbidity  [ ] Sleep apnea  [ ] COPD  [ ] BMI> 35  [ ] Liver dysfunction  [X] Renal dysfunction  [X ] CHF  [ ] Smoker  [X]  Age > 60 years    [X]  [place]  Reviewed and Copied to Chart. See below.    Plan of care and goal oriented pain management treatment options were discussed with patient and /or primary care giver; all questions and concerns were addressed and care was aligned with patient's wishes.    Educated patient on goal oriented pain management treatment options     09-13-23 @ 14:20

## 2023-09-13 NOTE — PROGRESS NOTE ADULT - PROBLEM SELECTOR PLAN 1
Pt with suprapubic and generalized back pain which is somatic in nature due to history of prostate CA with mets to liver and bone, on Cabazitaxel q3 weeks. Patient presented to the ED after a witnessed episode of loss of consciousness and fall, with head trauma. CT head demonstrated no intracranial hemorrhage or mass. Calvarial metastases. US Kidney and Bladder demonstrate increased renal cortical echogenicity compatible with medical renal disease. Mild left hydronephrosis.   Patient follows oncologist Dr. Nasir Gondal as outpatient. Per ISTOP pt was previously on Fentanyl patch 75 mcg/hr and Percocet.    Opioid pain recommendations:  - Continue Fentanyl patch 25 mcg/hr q72hr.- Family and patient refusing increasing dose at this time. Monitor for sedation/ respiratory depression.   - Change oxycodone 10mg PO q4h PRN severe pain. Monitor for sedation/ respiratory depression.   Non-opioid pain recommendations   - Avoid NSAIDS due to JOSE ANGEL, Cr. 4.17  - Continue acetaminophen 1g q 8hrs PRN for moderate pain.   Bowel Regimen   - Continue senna 2tabs PO at bedtime. Hold for loose stool/ diarrhea  - Increase miralax 17G PO BID.Hold for loose stool/ diarrhea  - Dulcolax 5mg PO BID PRN constipation.

## 2023-09-13 NOTE — PROGRESS NOTE ADULT - PROBLEM SELECTOR PLAN 8
LTC with hospice, SW following  pending placement, palliative following   comfort measures, mews exempt  pain control

## 2023-09-13 NOTE — PROGRESS NOTE ADULT - ASSESSMENT
Confidential Drug Utilization Report  Search Terms: ESTEBAN BIRD, 1960 Search Date: 09/02/2023 12:22:58 PM  The Drug Utilization Report below displays all of the controlled substance prescriptions, if any, that your patient has filled in the last twelve months. The information displayed on this report is compiled from pharmacy submissions to the Department, and accurately reflects the information as submitted by the pharmacies.    This report was requested by: Eveline Rose | Reference #: [id number]    You have not added a MARIA INES number. Keeping your MARIA INES number(s) up to date on the My MARIA INES # tab will enable the separation of your prescriptions from others in the search results.    Practitioner Count: 0  Pharmacy Count: 0  Current Opioid Prescriptions: 0  Current Benzodiazepine Prescriptions: 0  Current Stimulant Prescriptions: 0      Patient Demographic Information (PDI)       PDI	First Name	Last Name	Birth Date	Gender	Street Address	[facility] Code  A	Esteban Bird	1960	Male	[address]	[place]	[id number]    Prescription Information      PDI Filter:    PDI	Current Rx	Drug Type	Rx Written	Rx Dispensed	Drug	Quantity	Days Supply	Prescriber Name	Prescriber MARIA INES #	Payment Method	Dispenser  A	N	O	03/10/2023	03/28/2023	fentanyl 75 mcg/hr patch	10	30	Dick Jack	[id number]	Insurance	[id number]  A	N	O	02/13/2023	02/13/2023	oxycodone-acetaminophen  mg tab	120	30	GondalJuancho MD	[id number]	Insurance	[id number]  A	N	O	11/21/2022	11/21/2022	tramadol hcl 50 mg tablet	20	6	GondalJuancho MD	[id number]	Insurance	[facility] #[id number]  A	N	O	10/31/2022	10/31/2022	oxycodone-acetaminophen  mg tab	120	30	GondalJuancho MD	[id number]	Insurance	[id number]  A	N	O	09/06/2022	09/06/2022	oxycodone-acetaminophen  mg tab	120	30	GondalJuancho MD	[id number]	Insurance	[facility] #[id number]    * - Details of Drug Type : O = Opioid, B = Benzodiazepine, S = Stimulant    * - Drugs marked with an asterisk are compound drugs. If the compound drug is made up of more than one controlled substance, then each controlled substance will be a separate row in the table.

## 2023-09-13 NOTE — PROGRESS NOTE ADULT - SUBJECTIVE AND OBJECTIVE BOX
NP Note discussed with  primary attending    Patient is a 63y old  Male who presents with a chief complaint of Septic Shock 2/2 neutropenic fever (13 Sep 2023 12:14)      INTERVAL HPI/OVERNIGHT EVENTS: continued complaints pain    MEDICATIONS  (STANDING):  fentaNYL   Patch  25 MICROgram(s)/Hr 1 Patch Transdermal every 72 hours  melatonin 3 milliGRAM(s) Oral at bedtime  pantoprazole    Tablet 40 milliGRAM(s) Oral before breakfast  phenazopyridine 100 milliGRAM(s) Oral every 8 hours  polyethylene glycol 3350 17 Gram(s) Oral two times a day  senna 2 Tablet(s) Oral at bedtime    MEDICATIONS  (PRN):  acetaminophen     Tablet .. 1000 milliGRAM(s) Oral every 8 hours PRN Moderate Pain (4 - 6)  bisacodyl 5 milliGRAM(s) Oral every 12 hours PRN Constipation  calamine/zinc oxide Lotion 1 Application(s) Topical two times a day PRN Rash and/or Itching  lidocaine 2% Jelly 5 milliLiter(s) IntraUrethral every 12 hours PRN Pain at koehler site  oxyCODONE    IR 10 milliGRAM(s) Oral every 4 hours PRN Severe Pain (7 - 10)      __________________________________________________  REVIEW OF SYSTEMS:    CONSTITUTIONAL: No fever,   EYES: no acute visual disturbances  NECK: No pain or stiffness  RESPIRATORY: No cough; No shortness of breath  CARDIOVASCULAR: No chest pain, no palpitations  GASTROINTESTINAL: No pain. No nausea or vomiting; No diarrhea   NEUROLOGICAL: No headache or numbness, no tremors  MUSCULOSKELETAL: No joint pain, no muscle pain  GENITOURINARY: +dysuria, no frequency, no hesitancy  PSYCHIATRY: no depression , no anxiety  ALL OTHER  ROS negative        Vital Signs Last 24 Hrs  T(C): 36.6 (13 Sep 2023 06:08), Max: 36.6 (12 Sep 2023 20:40)  T(F): 97.8 (13 Sep 2023 06:08), Max: 97.9 (12 Sep 2023 20:40)  HR: 104 (13 Sep 2023 06:08) (104 - 108)  BP: 101/71 (13 Sep 2023 06:08) (92/61 - 101/71)  BP(mean): 71 (12 Sep 2023 20:40) (71 - 71)  RR: 18 (13 Sep 2023 06:08) (16 - 18)  SpO2: 95% (13 Sep 2023 06:08) (95% - 96%)    Parameters below as of 13 Sep 2023 06:08  Patient On (Oxygen Delivery Method): room air        ________________________________________________  PHYSICAL EXAM:  GENERAL: NAD, cachectic, chronically ill appearing   HEENT: Normocephalic;  conjunctivae and sclerae clear; upper lip lesion   NECK : supple  CHEST/LUNG: Clear to auscultation bilaterally with fair air entry   HEART: S1 S2  regular; no murmurs, gallops or rubs  ABDOMEN: Soft, Nontender, Nondistended; Bowel sounds present  EXTREMITIES: multiple areas of eccymosis   SKIN: warm and dry; no rash  NERVOUS SYSTEM:  Awake and alert; Oriented  to person and place; no new   _________________________________________________  LABS:      CAPILLARY BLOOD GLUCOSE    RADIOLOGY & ADDITIONAL TESTS:   < from: CT Cervical Spine No Cont (08.29.23 @ 18:05) >    ACC: [id number] EXAM:  CT CERVICAL SPINE   ORDERED BY: DENNY BRITO     ACC: [id number] EXAM:  CT BRAIN   ORDERED BY: DENNY BRITO     PROCEDURE DATE:  08/29/2023          INTERPRETATION:  CLINICAL INDICATION: Fall with head trauma, prostate   cancer    Brain CT:    5mm axial sections of the brain were obtained from base to vertex,   without the intravenous administration of contrast material. Coronal and   sagittal computer generated reconstructed views are available.    No prior brain imaging is available for comparison.    The fourth, third and lateral ventricles are normal size and position.   There is no hemorrhage, mass or shift of the midline structures. There is   normal gray white matter differentiation. Bone window examination is   remarkable for a foci of increased density within the frontal calvarium   bilaterally suggestive of sclerotic calvarial metastases.    Cervical spine CT:    Serial thin sections on a multi slice scanner were obtained through the   cervical spine from the C1 to the level T2 in a stacked axial fashion   reformatted at 1.25 mm sections with sagittal and coronal computer   generated reconstructed views.    There is normal alignment of the vertebral bodies and facet joints.    The vertebral bodies are normal in height. There are multiple foci of   increased density within the vertebral bodies in the C3, C4, C7 and T1   and T2 vertebral bodies and are consistent with sclerotic metastasis. No   paraspinal masses are identified. Mild degenerative uncal vertebral joint   and facet changes are noted. There is mild reversal the normal   straightening of the normal cervical lordosis. No acute fractures or   dislocations are identified.    IMPRESSION:     Brain CT: No intracranial hemorrhage or mass. Calvarial metastases.    Cervical spine CT: No acute fractures or dislocations. Degenerative   changes. Osseous metastases.    --- End of Report ---      KIRT PAK MD; Attending Radiologist  This document has been electronically signed. Aug29 2023  6:10PM    < end of copied text >      Imaging Personally Reviewed:  YES/    Consultant(s) Notes Reviewed:   YES/     Plan of care was discussed with patient and /or primary care giver; all questions and concerns were addressed and care was aligned with patient's wishes.

## 2023-09-13 NOTE — PROGRESS NOTE ADULT - SUBJECTIVE AND OBJECTIVE BOX
Patient is a 63y old  Male who presents with a chief complaint of Septic Shock 2/2 neutropenic fever (13 Sep 2023      INTERVAL HPI/OVERNIGHT EVENTS: cpt seen and examined  MEDICATIONS  (STANDING):  fentaNYL   Patch  25 MICROgram(s)/Hr 1 Patch Transdermal every 72 hours  melatonin 3 milliGRAM(s) Oral at bedtime  pantoprazole    Tablet 40 milliGRAM(s) Oral before breakfast  phenazopyridine 100 milliGRAM(s) Oral every 8 hours  polyethylene glycol 3350 17 Gram(s) Oral two times a day  senna 2 Tablet(s) Oral at bedtime    MEDICATIONS  (PRN):  acetaminophen     Tablet .. 1000 milliGRAM(s) Oral every 8 hours PRN Moderate Pain (4 - 6)  bisacodyl 5 milliGRAM(s) Oral every 12 hours PRN Constipation  calamine/zinc oxide Lotion 1 Application(s) Topical two times a day PRN Rash and/or Itching  lidocaine 2% Jelly 5 milliLiter(s) IntraUrethral every 12 hours PRN Pain at koehler site  oxyCODONE    IR 10 milliGRAM(s) Oral every 4 hours PRN Severe Pain (7 - 10)      __________________________________________________  REVIEW OF SYSTEMS:    CONSTITUTIONAL: No fever,   EYES: no acute visual disturbances  NECK: No pain or stiffness  RESPIRATORY: No cough; No shortness of breath  CARDIOVASCULAR: No chest pain, no palpitations  GASTROINTESTINAL: No pain. No nausea or vomiting; No diarrhea   NEUROLOGICAL: No headache or numbness, no tremors  MUSCULOSKELETAL: No joint pain, no muscle pain  GENITOURINARY: +dysuria, no frequency, no hesitancy  PSYCHIATRY: no depression , no anxiety  ALL OTHER  ROS negative        Vital Signs Last 24 Hrs  T(C): 36.6 (13 Sep 2023 06:08), Max: 36.6 (12 Sep 2023 20:40)  T(F): 97.8 (13 Sep 2023 06:08), Max: 97.9 (12 Sep 2023 20:40)  HR: 104 (13 Sep 2023 06:08) (104 - 108)  BP: 101/71 (13 Sep 2023 06:08) (92/61 - 101/71)  BP(mean): 71 (12 Sep 2023 20:40) (71 - 71)  RR: 18 (13 Sep 2023 06:08) (16 - 18)  SpO2: 95% (13 Sep 2023 06:08) (95% - 96%)    Parameters below as of 13 Sep 2023 06:08  Patient On (Oxygen Delivery Method): room air        ________________________________________________  PHYSICAL EXAM:  GENERAL: NAD, cachectic, chronically ill appearing   HEENT: Normocephalic;  conjunctivae and sclerae clear; upper lip lesion   NECK : supple  CHEST/LUNG: dec breath sounds at bases  HEART: S1 S2  regular; no murmurs, gallops or rubs  ABDOMEN: Soft, Nontender, Nondistended; Bowel sounds present  EXTREMITIES: multiple areas of eccymosis   SKIN: warm and dry; no rash  NERVOUS SYSTEM:  Awake and alert;     LABS:      CAPILLARY BLOOD GLUCOSE    RADIOLOGY & ADDITIONAL TESTS:   < from: CT Cervical Spine No Cont (08.29.23 @ 18:05) >    ACC: [id number] EXAM:  CT CERVICAL SPINE   ORDERED BY: DENNY BRITO     ACC: [id number] EXAM:  CT BRAIN   ORDERED BY: DENNY BRITO     PROCEDURE DATE:  08/29/2023          INTERPRETATION:  CLINICAL INDICATION: Fall with head trauma, prostate   cancer    Brain CT:    5mm axial sections of the brain were obtained from base to vertex,   without the intravenous administration of contrast material. Coronal and   sagittal computer generated reconstructed views are available.    No prior brain imaging is available for comparison.    The fourth, third and lateral ventricles are normal size and position.   There is no hemorrhage, mass or shift of the midline structures. There is   normal gray white matter differentiation. Bone window examination is   remarkable for a foci of increased density within the frontal calvarium   bilaterally suggestive of sclerotic calvarial metastases.    Cervical spine CT:    Serial thin sections on a multi slice scanner were obtained through the   cervical spine from the C1 to the level T2 in a stacked axial fashion   reformatted at 1.25 mm sections with sagittal and coronal computer   generated reconstructed views.    There is normal alignment of the vertebral bodies and facet joints.    The vertebral bodies are normal in height. There are multiple foci of   increased density within the vertebral bodies in the C3, C4, C7 and T1   and T2 vertebral bodies and are consistent with sclerotic metastasis. No   paraspinal masses are identified. Mild degenerative uncal vertebral joint   and facet changes are noted. There is mild reversal the normal   straightening of the normal cervical lordosis. No acute fractures or   dislocations are identified.    IMPRESSION:     Brain CT: No intracranial hemorrhage or mass. Calvarial metastases.    Cervical spine CT: No acute fractures or dislocations. Degenerative   changes. Osseous metastases.    --- End of Report ---      KIRT PAK MD; Attending Radiologist  This document has been electronically signed. Aug29 2023  6:10PM    < end of copied text >      Imaging Personally Reviewed:  YES/    Consultant(s) Notes Reviewed:   YES/     Plan of care was discussed with patient and /or primary care giver; all questions and concerns were addressed and care was aligned with patient's wishes.

## 2023-09-13 NOTE — PROGRESS NOTE ADULT - PROBLEM SELECTOR PLAN 1
on active chemo   likely secondary to Cabazitaxel  Continue supportive care  has opted for comfort care only   pending LTC with hospice

## 2023-09-13 NOTE — PROGRESS NOTE ADULT - PROBLEM SELECTOR PLAN 6
refer to palliative notes for full GOC Discussion   opts for ltc with hospital   comfort care only   no further work up or labs

## 2023-09-14 LAB — SARS-COV-2 RNA SPEC QL NAA+PROBE: SIGNIFICANT CHANGE UP

## 2023-09-14 PROCEDURE — 99233 SBSQ HOSP IP/OBS HIGH 50: CPT

## 2023-09-14 PROCEDURE — 99232 SBSQ HOSP IP/OBS MODERATE 35: CPT

## 2023-09-14 RX ORDER — FENTANYL CITRATE 50 UG/ML
1 INJECTION INTRAVENOUS
Refills: 0 | Status: DISCONTINUED | OUTPATIENT
Start: 2023-09-14 | End: 2023-09-15

## 2023-09-14 RX ADMIN — OXYCODONE HYDROCHLORIDE 10 MILLIGRAM(S): 5 TABLET ORAL at 05:51

## 2023-09-14 RX ADMIN — POLYETHYLENE GLYCOL 3350 17 GRAM(S): 17 POWDER, FOR SOLUTION ORAL at 18:02

## 2023-09-14 RX ADMIN — OXYCODONE HYDROCHLORIDE 10 MILLIGRAM(S): 5 TABLET ORAL at 21:40

## 2023-09-14 RX ADMIN — OXYCODONE HYDROCHLORIDE 10 MILLIGRAM(S): 5 TABLET ORAL at 06:30

## 2023-09-14 RX ADMIN — POLYETHYLENE GLYCOL 3350 17 GRAM(S): 17 POWDER, FOR SOLUTION ORAL at 05:50

## 2023-09-14 RX ADMIN — OXYCODONE HYDROCHLORIDE 10 MILLIGRAM(S): 5 TABLET ORAL at 22:30

## 2023-09-14 RX ADMIN — Medication 100 MILLIGRAM(S): at 05:49

## 2023-09-14 RX ADMIN — Medication 3 MILLIGRAM(S): at 21:38

## 2023-09-14 RX ADMIN — PANTOPRAZOLE SODIUM 40 MILLIGRAM(S): 20 TABLET, DELAYED RELEASE ORAL at 05:49

## 2023-09-14 NOTE — PROGRESS NOTE ADULT - PROBLEM SELECTOR PLAN 7
dvt prophylaxis: SCD  GI prophylaxis: protonix
Albumin 2.1  start ensure twice daily  supportive care
Pt with end stage metastatic prostate cancer, he remains appropriate for LTC w hospice, likely prognosis of weeks to < 2 months.    Continue supportive care and management as outlined above.  Discharge planning in progress  Patient is DNR/DNI    Palliative care team will continue to follow
dvt prophylaxis: SCD  GI prophylaxis: protonix
dvt prophylaxis: SCD  GI prophylaxis: protonix
Albumin 2.1  start ensure twice daily  supportive care
dvt prophylaxis: SCD  GI prophylaxis: protonix
Pt remains appropriate for LTC w hospice.   Please see discussion noted above in GOC conversation
dvt prophylaxis: SCD  GI prophylaxis: protonix
Albumin 2.1  start ensure twice daily  supportive care
dvt prophylaxis: SCD  GI prophylaxis: protonix
Albumin 2.1  start ensure twice daily  supportive care
dvt prophylaxis: SCD  GI prophylaxis: protonix

## 2023-09-14 NOTE — PROGRESS NOTE ADULT - SUBJECTIVE AND OBJECTIVE BOX
follow up on:  complex medical decision making related to goals of care    [facility] Geriatric and Palliative Consult Service:  Niki Carr DO: cell ([phone])  Bogdan Moscoso MD: cell ([phone])  Imtiaz George NP: cell [phone])   Lul Reed LMSW: cell [phone])   Jeanne Amaral NP: via [employer] Teams    Can contact any Palliative Team member via [employer] Teams for consults and questions      OVERNIGHT EVENTS:    Present Symptoms: Mild, Moderate, Severe  Pain:             Location -                               Aggravating factors -             Quality -             Radiation -             Timing-             Severity (0-10 scale):             Minimal acceptable level (0-10 scale):  Fatigue:  Nausea:  Lack of Appetite:   SOB:  Depression:  Anxiety:  Review of Systems: [All others negative or Unable to obtain due to poor mentation]    CPOT:    https://www.[facility].org/getattachment/[email or web address]/Critical-Care-Pain-Observation-Tool-(CPOT)  PAIN AD Score:   http://geriatrictoolkit.[facility]/cog/painad.pdf (press ctrl +  left click to view)MEDICATIONS  (STANDING):  fentaNYL   Patch  25 MICROgram(s)/Hr 1 Patch Transdermal every 72 hours  melatonin 3 milliGRAM(s) Oral at bedtime  pantoprazole    Tablet 40 milliGRAM(s) Oral before breakfast  polyethylene glycol 3350 17 Gram(s) Oral two times a day  senna 2 Tablet(s) Oral at bedtime    MEDICATIONS  (PRN):  acetaminophen     Tablet .. 1000 milliGRAM(s) Oral every 8 hours PRN Moderate Pain (4 - 6)  bisacodyl 5 milliGRAM(s) Oral every 12 hours PRN Constipation  calamine/zinc oxide Lotion 1 Application(s) Topical two times a day PRN Rash and/or Itching  lidocaine 2% Jelly 5 milliLiter(s) IntraUrethral every 12 hours PRN Pain at koehler site  oxyCODONE    IR 10 milliGRAM(s) Oral every 4 hours PRN Severe Pain (7 - 10)      PHYSICAL EXAM:  Vital Signs Last 24 Hrs  T(C): 36.8 (14 Sep 2023 20:26), Max: 36.8 (14 Sep 2023 20:26)  T(F): 98.2 (14 Sep 2023 20:26), Max: 98.2 (14 Sep 2023 20:26)  HR: 102 (14 Sep 2023 20:26) (99 - 102)  BP: 104/73 (14 Sep 2023 20:26) (97/62 - 104/73)  BP(mean): 78 (14 Sep 2023 20:26) (76 - 78)  RR: 18 (14 Sep 2023 20:26) (18 - 18)  SpO2: 97% (14 Sep 2023 20:26) (97% - 98%)    Parameters below as of 14 Sep 2023 20:26  Patient On (Oxygen Delivery Method): room air        General: alert  oriented x ____    lethargic distressed cachexia  verbal nonverbal  unarousable     Palliative Performance Scale/Karnofsky Score:  http://npcrc.org/files/news/palliative_performance_scale_ppsv2.pdf    HEENT: no abnormal lesion, dry mouth  ET tube/trach oral lesions:  Lungs: tachypnea/labored breathing, audible excessive secretions  CV: RRR, S1S2, tachycardia  GI: soft non distended non tender  incontinent               PEG/NG/OG tube  constipation  last BM:   : incontinent  oliguria/anuria  koehler  Musculoskeletal: weakness x4 edema x4    ambulatory with assistance   mostly/fully bedbound/wheelchair bound  Skin: no abnormal skin lesions, poor skin turgor, pressure ulcers stage:   Neuro: no deficits, mild cognitive impairment dsyphagia/dysarthria paresis  Oral intake ability: unable/only mouth care, minimal moderate full capability    LABS:                RADIOLOGY & ADDITIONAL STUDIES: follow up on:  complex medical decision making related to goals of care    [facility] Geriatric and Palliative Consult Service:  Niki Carr DO: cell [phone])  Bogdan Moscoso MD: cell ([phone])  Imtiaz George NP: cell ([phone])   Lul Reed LMSW: [place] ([phone])   Jeanne Amaral NP: via [employer] Teams    Can contact any Palliative Team member via [employer] Teams for consults and questions      OVERNIGHT EVENTS:  None.  Discharge planning in progress for [facility], waiting for final authorization.  Palliative Care service asked to rejoin for pain and symptom management.      Patient examined this afternoon with daughter at bedside.  Reports 5/10 back and scrotal pain, states that pain is better controlled with recent titration of Fentanyl patch and PRN IR oxycodone (still taking oxycodone 2-3 times/day on average).  Continued dysuria/urethral pain with urination, improved with lidocaine jelly.  Denies chest pain, SOB, nausea, or vomiting.  Continued very poor oral intake, no BM x 6 days (despite recent titration of bowel meds).  Otherwise has no acute complaints.      Present Symptoms: Mild, Moderate, Severe  Pain:  moderate             Location -       back, scrotum                          Aggravating factors -             Quality -             Radiation -             Timing-             Severity (0-10 scale):  5/10              Minimal acceptable level (0-10 scale):  2/10   Fatigue:  moderate  Nausea:  denies  Lack of Appetite:  severe  SOB:  denies  Depression:  denies  Anxiety:  Review of Systems:   All other systems reviewed and are negative      MEDICATIONS  (STANDING):  fentaNYL   Patch  25 MICROgram(s)/Hr 1 Patch Transdermal every 72 hours  melatonin 3 milliGRAM(s) Oral at bedtime  pantoprazole    Tablet 40 milliGRAM(s) Oral before breakfast  polyethylene glycol 3350 17 Gram(s) Oral two times a day  senna 2 Tablet(s) Oral at bedtime    MEDICATIONS  (PRN):  acetaminophen     Tablet .. 1000 milliGRAM(s) Oral every 8 hours PRN Moderate Pain (4 - 6)  bisacodyl 5 milliGRAM(s) Oral every 12 hours PRN Constipation  calamine/zinc oxide Lotion 1 Application(s) Topical two times a day PRN Rash and/or Itching  lidocaine 2% Jelly 5 milliLiter(s) IntraUrethral every 12 hours PRN Pain at koehler site  oxyCODONE    IR 10 milliGRAM(s) Oral every 4 hours PRN Severe Pain (7 - 10)      PHYSICAL EXAM:  Vital Signs Last 24 Hrs  T(C): 36.8 (14 Sep 2023 20:26), Max: 36.8 (14 Sep 2023 20:26)  T(F): 98.2 (14 Sep 2023 20:26), Max: 98.2 (14 Sep 2023 20:26)  HR: 102 (14 Sep 2023 20:26) (99 - 102)  BP: 104/73 (14 Sep 2023 20:26) (97/62 - 104/73)  BP(mean): 78 (14 Sep 2023 20:26) (76 - 78)  RR: 18 (14 Sep 2023 20:26) (18 - 18)  SpO2: 97% (14 Sep 2023 20:26) (97% - 98%)    Parameters below as of 14 Sep 2023 20:26  Patient On (Oxygen Delivery Method): room air        General: alert  oriented x _2 to 3___   cachectic, NAD    Palliative Performance Scale/Karnofsky Score:  30%  http://npcrc.org/files/news/palliative_performance_scale_ppsv2.pdf    HEENT: EOMI  anicteric, pharynx clear, MM moist  Lungs: clear to ascultation, respirations unlabored  CV: tachycardic, normal S1 and S2, no murmur  GI: soft non distended non tender + bowel sounds  Last BM 6 days ago  : continent  Musculoskeletal: weakness x4 in all extremities, essentially bedbound, 1+ edema noted  Skin: no abnormal skin lesions or DU noted  Neuro: no focal deficits  Oral intake ability: moderate capability    LABS:    Albumin: 2.3 g/dL (09.06.23 @ 07:27)        RADIOLOGY & ADDITIONAL STUDIES:

## 2023-09-14 NOTE — PROGRESS NOTE ADULT - PROBLEM SELECTOR PLAN 3
Newly diagnosed congestive heart failure, with severe cardiomyopathy, EF 15-20%.  Respiratory status stable. Appears comfortable. Cardio following. DNR/DNI.

## 2023-09-14 NOTE — PROGRESS NOTE ADULT - SUBJECTIVE AND OBJECTIVE BOX
Source of information: DHAVAL BIRD, Chart review, spouse and daughter  Patient language: English  : n/a    HPI:  This is a 64 yo M with pmhx of prostate CA with mets to liver and bone, on Cabazitaxel q3 weeks, presents to the ED for generalized weakness and pain. Per wife, who is at bedside, states patient had an episode of loss of consciousness and fall, with head trauma. Patient also mentions having dysuria since yesterday. patient started this regimen in march, and this is the first time he has had such symptoms. He follows up with Oncologist Dr. Nasir Gondal. He received his last chemo treatment 1 week ago.  (30 Aug 2023 01:34)    Patient admitted to ICU for septic shock 2/2 neutropenic fever and urosepsis. Patient downgraded to the medicine floor on 9/2/23. Urine culture positive for E.coli on Rocephin 1g q 24hrs x 7 days. Pain consulted on 9/2 for chest and throat pain. Pt seen and examined at bedside this morning, wife present at bedside. Pt laying in bed, fatigued, closing eyes frequently during conversation. Reports lumbar back and scrotal pain score 5/10 and tolerable with current pain regimen. Reports current pain regimen is effectively managing his pain. Does not want to further increase current pain regimen at this time. States he was able to sleep well overnight. Also reports dysuria. Pt is tolerating PO diet, limited intake and poor appetite. Pt denies chest pain, SOB, nausea, vomiting. Wife reports constipation, last BM 9/6. Patient stated goal for pain control: to be able to take deep breaths, get out of bed to chair and ambulate with tolerable pain control. Per [facility] pt was on Fentanyl patch 75 mcg/hr at home and Percocet 10-325mg PO q6h PRN. Plan for LTC with hospice. MEWs excempt. Comfort measures. Plan for discharge on 9/15.     PAST MEDICAL & SURGICAL HISTORY:    CA of prostate    Enlarged prostate with lower urinary tract symptoms (LUTS)    No significant past surgical history    FAMILY HISTORY:    Family history of hypertension (Mother)    Social History:   [X]Denies ETOH use, illicit drug use, and smoking     Allergies    No Known Allergies    MEDICATIONS  (STANDING):  fentaNYL   Patch  25 MICROgram(s)/Hr 1 Patch Transdermal every 72 hours  melatonin 3 milliGRAM(s) Oral at bedtime  pantoprazole    Tablet 40 milliGRAM(s) Oral before breakfast  phenazopyridine 100 milliGRAM(s) Oral every 8 hours  polyethylene glycol 3350 17 Gram(s) Oral two times a day  senna 2 Tablet(s) Oral at bedtime    MEDICATIONS  (PRN):  acetaminophen     Tablet .. 1000 milliGRAM(s) Oral every 8 hours PRN Moderate Pain (4 - 6)  bisacodyl 5 milliGRAM(s) Oral every 12 hours PRN Constipation  calamine/zinc oxide Lotion 1 Application(s) Topical two times a day PRN Rash and/or Itching  lidocaine 2% Jelly 5 milliLiter(s) IntraUrethral every 12 hours PRN Pain at koehler site  oxyCODONE    IR 10 milliGRAM(s) Oral every 4 hours PRN Severe Pain (7 - 10)      Vital Signs Last 24 Hrs  T(C): 36.7 (14 Sep 2023 05:14), Max: 36.7 (13 Sep 2023 20:56)  T(F): 98 (14 Sep 2023 05:14), Max: 98 (13 Sep 2023 20:56)  HR: 102 (14 Sep 2023 05:14) (102 - 105)  BP: 97/62 (14 Sep 2023 05:14) (90/66 - 97/62)  BP(mean): 71 (13 Sep 2023 14:17) (71 - 71)  RR: 18 (14 Sep 2023 05:14) (18 - 18)  SpO2: 98% (14 Sep 2023 05:14) (95% - 98%)    Parameters below as of 14 Sep 2023 05:14  Patient On (Oxygen Delivery Method): room air      COVID-19 PCR: NotDetec (14 Sep 2023 06:32)  COVID-19 PCR: NotDetec (07 Sep 2023 18:10)  SARS-CoV-2: NotDetec (30 Aug 2023 09:30)    LABS: Reviewed    CAPILLARY BLOOD GLUCOSE      COVID-19 PCR: NotDetec (14 Sep 2023 06:32)  COVID-19 PCR: NotDetec (07 Sep 2023 18:10)  SARS-CoV-2: NotDetec (30 Aug 2023 09:30)    LABS: Reviewed    Radiology: Reviewed    < from: US Renal (08.31.23 @ 14:16) >    ACC: [id number] EXAM:  US KIDNEY(S)   ORDERED BY: HIPOLITO ROSALES     ACC: [id number] EXAM:  US URINARY BLADDER   ORDERED BY: LULU COPPOLA     PROCEDURE DATE:  08/31/2023      INTERPRETATION:  CLINICAL INFORMATION: Urinary retention, acute kidney   injury    COMPARISON: CT abdomen pelvis dated 8/29/2023    TECHNIQUE: Sonography of the kidneys and bladder.    FINDINGS:    There is bilateral increased renal cortical echogenicity.    Right kidney measures 12.7 cm in length. No hydronephrosis or shadowing   stone.    Left kidney measures 11.9 cm in length. There is mild hydronephrosis.    Urinary bladder is incompletely collapsed around a Koehler catheter.    IMPRESSION:  Increased renal cortical echogenicity compatible with medical renal   disease.    Mild left hydronephrosis.    Urinary bladder is incompletely collapsed around a Koehler catheter.    --- End of Report ---    ISAIAS KELLEY MD; Attending Radiologist  This document has been electronically signed. Aug 31 2023  2:39PM    < end of copied text >    < from: CT Cervical Spine No Cont (08.29.23 @ 18:05) >    ACC: [id number] EXAM:  CT CERVICAL SPINE   ORDERED BY: DNENY BRITO   ACC: [id number] EXAM:  CT BRAIN   ORDERED BY: DENNY BRITO     PROCEDURE DATE:  08/29/2023      INTERPRETATION:  CLINICAL INDICATION: Fall with head trauma, prostate   cancer    Brain CT:    5mm axial sections of the brain were obtained from base to vertex,   without the intravenous administration of contrast material. Coronal and   sagittal computer generated reconstructed views are available.    No prior brain imaging is available for comparison.    The fourth, third and lateral ventricles are normal size and position.   There is no hemorrhage, mass or shift of the midline structures. There is   normal gray white matter differentiation. Bone window examination is   remarkable for a foci of increased density within the frontal calvarium   bilaterally suggestive of sclerotic calvarial metastases.    Cervical spine CT:    Serial thin sections on a multi slice scanner were obtained through the   cervical spine from the C1 to the level T2 in a stacked axial fashion   reformatted at 1.25 mm sections with sagittal and coronal computer   generated reconstructed views.    There is normal alignment of the vertebral bodies and facet joints.    The vertebral bodies are normal in height. There are multiple foci of   increased density within the vertebral bodies in the C3, C4, C7 and T1   and T2 vertebral bodies and are consistent with sclerotic metastasis. No   paraspinal masses are identified. Mild degenerative uncal vertebral joint   and facet changes are noted. There is mild reversal the normal   straightening of the normal cervical lordosis. No acute fractures or   dislocations are identified.    IMPRESSION:     Brain CT: No intracranial hemorrhage or mass. Calvarial metastases.    Cervical spine CT: No acute fractures or dislocations. Degenerative   changes. Osseous metastases.    --- End of Report ---      KIRT PAK MD; Attending Radiologist  This document has been electronically signed. Aug29 2023  6:10PM    < end of copied text >      ORT Score -   Family Hx of substance abuse	Female	      Male  Alcohol 	                                           1                     3  Illegal drugs	                                   2                     3  Rx drugs                                           4 	                  4  Personal Hx of substance abuse		  Alcohol 	                                          3	                  3  Illegal drugs                                     4	                  4  Rx drugs                                            5 	                  5  Age between 16- 45 years	           1                     1  hx preadolescent sexual abuse	   3 	                  0  Psychological disease		  ADD, OCD, bipolar, schizophrenia   2	          2  Depression                                           1 	          1  Total: 0    a score of 3 or lower indicates low risk for opioid abuse		  a score of 4-7 indicates moderate risk for opioid abuse		  a score of 8 or higher indicates high risk for opioid abuse    REVIEW OF SYSTEMS:  CONSTITUTIONAL: No fever + fatigue  HEENT:  No difficulty hearing, no change in vision  NECK: No pain or stiffness  RESPIRATORY: + cough, no shortness of breath  CARDIOVASCULAR: denies chest pain, no palpitations, dizziness  GASTROINTESTINAL: + loss of appetite, poor PO intake. No abdominal or epigastric pain. No nausea, vomiting; + constipation last BM (9/6)  GENITOURINARY: + suprapubic pain + dysuria  MUSCULOSKELETAL: + back pain; + occasional leg pain. no upper or lower motor strength weakness, no saddle anesthesia, + episode of bowel incontinence (9/6), no falls   NEURO: No headaches, No numbness/tingling b/l LE, No weakness    PHYSICAL EXAM:  GENERAL: + fatigue, & Oriented X3, cooperative, NAD, closes eyes during conversation. Speech is clear, soft.   RESPIRATORY: Respirations even and unlabored. Clear to auscultation bilaterally; No rales, rhonchi, wheezing, or rubs  CARDIOVASCULAR: Normal S1/S2, regular rate and rhythm; No murmurs, rubs, or gallops. No JVD.   GASTROINTESTINAL:  Soft, Nontender, Nondistended; Bowel sounds present  GENITOURINARY: + suprapubic pain with palpation  PERIPHERAL VASCULAR:  Extremities warm without edema. 2+ Peripheral Pulses, No cyanosis, No calf tenderness  MUSCULOSKELETAL: Motor Strength 4/5 B/L upper and lower extremities; ROM intact; negative SLR; + lumbar back tenderness on palpation.   SKIN: Warm, dry, intact. No rashes, lesions, scars or wounds. + right upper arm ecchymosis  + left chest wall fentanyl patch 25mcg    Risk factors associated with adverse outcomes related to opioid treatment  [ ] Concurrent benzodiazepine use  [ ] History/ Active substance use or alcohol use disorder  [ ] Psychiatric co-morbidity  [ ] Sleep apnea  [ ] COPD  [ ] BMI> 35  [ ] Liver dysfunction  [X] Renal dysfunction  [X ] CHF  [ ] Smoker  [X]  Age > 60 years    [X]  [place]  Reviewed and Copied to Chart. See below.    Plan of care and goal oriented pain management treatment options were discussed with patient and /or primary care giver; all questions and concerns were addressed and care was aligned with patient's wishes.    Educated patient on goal oriented pain management treatment options     09-14-23 @ 11:59

## 2023-09-14 NOTE — PROGRESS NOTE ADULT - PROBLEM SELECTOR PLAN 2
Hx of prostate cancer with mets to liver and bone,  with POD despite chemotherapy  Follows w Oncologist Dr. Nasir Gondal.

## 2023-09-14 NOTE — PROGRESS NOTE ADULT - NUTRITIONAL ASSESSMENT
This patient has been assessed with a concern for Malnutrition and has been determined to have a diagnosis/diagnoses of Severe protein-calorie malnutrition.    This patient is being managed with:   Diet Regular-  Entered: Sep 12 2023  9:54AM    Diet Regular-  Supplement Feeding Modality:  Oral  Ensure Enlive Cans or Servings Per Day:  1       Frequency:  Two Times a day  Entered: Sep  6 2023  8:37AM    The following pending diet order is being considered for treatment of Severe protein-calorie malnutrition:null
This patient has been assessed with a concern for Malnutrition and has been determined to have a diagnosis/diagnoses of Severe protein-calorie malnutrition.    This patient is being managed with:   Diet Regular-  Supplement Feeding Modality:  Oral  Ensure Enlive Cans or Servings Per Day:  1       Frequency:  Two Times a day  Entered: Sep  6 2023  8:37AM  
#Sinus Tachycardia  Likely in setting of fever and dehydration  s/p LR 100cc/hr    # Cardiogenic Shock   Troponin 156.7  trend trops  hypotension: on levophed and vasopressin  f/u TTE  ================- Pulm=================================  #Tachypnea  Likely 2/2 lactic acidosis  c/w bipap for now  ==================ID===================================  #Septic Shock  meropenam day1/  s/p 3L LR and 2L NS (total 5L)  Because patient w/ chronic steroid use, will give hydrocortisone 200 x1, and 50 q6hrs  hypotension: levophed and vasopressin  f/u ucx     #Neutropenic fever  With ANC of 25  meropenam day1/8  Will give 1 dose of Vanc  f/u vanc trough  Heme/Onc [facility] consulted (8/31)Dr. Casper   ================= Nephro================================  #JOSE ANGEL  Cr 3.55   CT A/P Mild left hydroureteronephrosis extending to the collapsed bladder  f/u UA and Urine lytes    #Lactic Acidosis  Lactate 4.4  In setting of hypoperfusion  s/p 5L (3LR 2NS)  Trend until resolution  =================GI====================================  #Diarrhea  had 3 episodes of diarrhea in ED  Likely 2/2 chemotherapy use  F/u Stool cx, gi pcr, stool O&P, shiga toxin    ================ Heme==================================  #Neutropenia  As above    #Schistocytes. HUS vs TTP  thrombocytopenia (no active bleeding)  JOSE ANGEL     #Thrombocytopenia  (same as above)  f/u ADAMTS 13  =================Endocrine===============================  No issues    ================= Skin/Catheters============================  Peripheral IV's  Arterial line (8/30)  Parisi catheter
This patient has been assessed with a concern for Malnutrition and has been determined to have a diagnosis/diagnoses of Severe protein-calorie malnutrition.    This patient is being managed with:   Diet Regular-  Supplement Feeding Modality:  Oral  Ensure Enlive Cans or Servings Per Day:  1       Frequency:  Two Times a day  Entered: Sep  6 2023  8:37AM  
This patient has been assessed with a concern for Malnutrition and has been determined to have a diagnosis/diagnoses of Severe protein-calorie malnutrition.    This patient is being managed with:   Diet Regular-  Supplement Feeding Modality:  Oral  Ensure Enlive Cans or Servings Per Day:  1       Frequency:  Two Times a day  Entered: Sep  6 2023  8:37AM  
This patient has been assessed with a concern for Malnutrition and has been determined to have a diagnosis/diagnoses of Severe protein-calorie malnutrition.    This patient is being managed with:   Diet Regular-  Entered: Sep 12 2023  9:54AM    Diet Regular-  Supplement Feeding Modality:  Oral  Ensure Enlive Cans or Servings Per Day:  1       Frequency:  Two Times a day  Entered: Sep  6 2023  8:37AM    The following pending diet order is being considered for treatment of Severe protein-calorie malnutrition:null
This patient has been assessed with a concern for Malnutrition and has been determined to have a diagnosis/diagnoses of Severe protein-calorie malnutrition.    This patient is being managed with:   Diet Regular-  Supplement Feeding Modality:  Oral  Ensure Enlive Cans or Servings Per Day:  1       Frequency:  Two Times a day  Entered: Sep  6 2023  8:37AM  
This patient has been assessed with a concern for Malnutrition and has been determined to have a diagnosis/diagnoses of Severe protein-calorie malnutrition.    This patient is being managed with:   Diet Regular-  Entered: Sep 12 2023  9:54AM    Diet Regular-  Supplement Feeding Modality:  Oral  Ensure Enlive Cans or Servings Per Day:  1       Frequency:  Two Times a day  Entered: Sep  6 2023  8:37AM    The following pending diet order is being considered for treatment of Severe protein-calorie malnutrition:null
This patient has been assessed with a concern for Malnutrition and has been determined to have a diagnosis/diagnoses of Severe protein-calorie malnutrition.    This patient is being managed with:   Diet Regular-  Supplement Feeding Modality:  Oral  Ensure Enlive Cans or Servings Per Day:  1       Frequency:  Two Times a day  Entered: Sep  6 2023  8:37AM  
This patient has been assessed with a concern for Malnutrition and has been determined to have a diagnosis/diagnoses of Severe protein-calorie malnutrition.    This patient is being managed with:   Diet Regular-  Entered: Sep 12 2023  9:54AM    Diet Regular-  Supplement Feeding Modality:  Oral  Ensure Enlive Cans or Servings Per Day:  1       Frequency:  Two Times a day  Entered: Sep  6 2023  8:37AM    The following pending diet order is being considered for treatment of Severe protein-calorie malnutrition:null  This patient has been assessed with a concern for Malnutrition and has been determined to have a diagnosis/diagnoses of Severe protein-calorie malnutrition.    This patient is being managed with:   Diet Regular-  Entered: Sep 12 2023  9:54AM    Diet Regular-  Supplement Feeding Modality:  Oral  Ensure Enlive Cans or Servings Per Day:  1       Frequency:  Two Times a day  Entered: Sep  6 2023  8:37AM    The following pending diet order is being considered for treatment of Severe protein-calorie malnutrition:null
This patient has been assessed with a concern for Malnutrition and has been determined to have a diagnosis/diagnoses of Severe protein-calorie malnutrition.    This patient is being managed with:   Diet Regular-  Supplement Feeding Modality:  Oral  Ensure Enlive Cans or Servings Per Day:  1       Frequency:  Two Times a day  Entered: Sep  6 2023  8:37AM  
This patient has been assessed with a concern for Malnutrition and has been determined to have a diagnosis/diagnoses of Severe protein-calorie malnutrition.    This patient is being managed with:   Diet Regular-  Entered: Sep 12 2023  9:54AM    Diet Regular-  Supplement Feeding Modality:  Oral  Ensure Enlive Cans or Servings Per Day:  1       Frequency:  Two Times a day  Entered: Sep  6 2023  8:37AM    The following pending diet order is being considered for treatment of Severe protein-calorie malnutrition:null
This patient has been assessed with a concern for Malnutrition and has been determined to have a diagnosis/diagnoses of Severe protein-calorie malnutrition.    This patient is being managed with:   Diet Regular-  Entered: Sep 12 2023  9:54AM    Diet Regular-  Supplement Feeding Modality:  Oral  Ensure Enlive Cans or Servings Per Day:  1       Frequency:  Two Times a day  Entered: Sep  6 2023  8:37AM    The following pending diet order is being considered for treatment of Severe protein-calorie malnutrition:null
This patient has been assessed with a concern for Malnutrition and has been determined to have a diagnosis/diagnoses of Severe protein-calorie malnutrition.    This patient is being managed with:   Diet Regular-  Supplement Feeding Modality:  Oral  Ensure Enlive Cans or Servings Per Day:  1       Frequency:  Two Times a day  Entered: Sep  6 2023  8:37AM

## 2023-09-14 NOTE — PROGRESS NOTE ADULT - SUBJECTIVE AND OBJECTIVE BOX
Patient is a 63y old  Male who presents with a chief complaint of Septic Shock 2/2 neutropenic fever (14 Sep 2023       INTERVAL HPI/OVERNIGHT EVENTS: no new complaints, pt seen at bedside, appearing lethargic, pale and dry.     MEDICATIONS  (STANDING):  fentaNYL   Patch  25 MICROgram(s)/Hr 1 Patch Transdermal every 72 hours  melatonin 3 milliGRAM(s) Oral at bedtime  pantoprazole    Tablet 40 milliGRAM(s) Oral before breakfast  phenazopyridine 100 milliGRAM(s) Oral every 8 hours  polyethylene glycol 3350 17 Gram(s) Oral two times a day  senna 2 Tablet(s) Oral at bedtime    MEDICATIONS  (PRN):  acetaminophen     Tablet .. 1000 milliGRAM(s) Oral every 8 hours PRN Moderate Pain (4 - 6)  bisacodyl 5 milliGRAM(s) Oral every 12 hours PRN Constipation  calamine/zinc oxide Lotion 1 Application(s) Topical two times a day PRN Rash and/or Itching  lidocaine 2% Jelly 5 milliLiter(s) IntraUrethral every 12 hours PRN Pain at koehler site  oxyCODONE    IR 10 milliGRAM(s) Oral every 4 hours PRN Severe Pain (7 - 10)      __    Vital Signs Last 24 Hrs  T(C): 36.7 (14 Sep 2023 05:14), Max: 36.7 (13 Sep 2023 20:56)  T(F): 98 (14 Sep 2023 05:14), Max: 98 (13 Sep 2023 20:56)  HR: 102 (14 Sep 2023 05:14) (102 - 105)  BP: 97/62 (14 Sep 2023 05:14) (90/66 - 97/62)  BP(mean): 71 (13 Sep 2023 14:17) (71 - 71)  RR: 18 (14 Sep 2023 05:14) (18 - 18)  SpO2: 98% (14 Sep 2023 05:14) (95% - 98%)    Parameters below as of 14 Sep 2023 05:14  Patient On (Oxygen Delivery Method): room air        ________________________________________________  PHYSICAL EXAM:  GENERAL: NAD  HEENT: Normocephalic;  conjunctivae and sclerae clear; moist mucous membranes;   NECK : supple  CHEST/LUNG: dec breath sounds at bases  HEART: S1 S2  regular; no murmurs, gallops or rubs  ABDOMEN: Soft, Nontender, Nondistended; Bowel sounds present  EXTREMITIES: no cyanosis; no edema; no calf tenderness  SKIN: warm and dry; no rash  NERVOUS SYSTEM:  Awake and alert;     _________________________________________________  LABS:              CAPILLARY BLOOD GLUCOSE            RADIOLOGY & ADDITIONAL TESTS:  < from: US Renal (08.31.23 @ 14:16) >  IMPRESSION:  Increased renal cortical echogenicity compatible with medical renal   disease.    Mild left hydronephrosis.    Urinary bladder is incompletely collapsed around a Koehler catheter.      < end of copied text >      Imaging Personally Reviewed:  YES/NO    Consultant(s) Notes Reviewed:   YES/ No    Care Discussed with Consultants :     Plan of care was discussed with patient and /or primary care giver; all questions and concerns were addressed and care was aligned with patient's wishes.

## 2023-09-14 NOTE — PROGRESS NOTE ADULT - ASSESSMENT
62 yo M pmhx of prostate CA with mets to liver and bone, on Cabazitaxel q3 weeks, originally presented to [facility] ED late evening 8/29 for generalized weakness and pain.  Admitted to ICU for septic shock 2/2  urosepsis vs cardiogenic shock, and febrile neutropenia. Blood cultures were negative, urine culture revealed E. Coli 10,000-49,000, initially started on meropenem and later switched to Rocephin 1g qd to complete 7 day course ID Dr. Manrique followed. Hospital course complicated by substernal chest pain 2x trops were elevated, EKG NSR, repeat troponin shows downgrading trops. PERCY shows EF: 15-20% likely chemotherapy induced. Also noted with pancytopenia, patient has been afebrile, his WBC count has been slowly uptrending on filgastrim, received 2 units PRBCs and 7 units platelets. Also with worsening JOSE ANGEL, and poor PO intake, palliative consulted for goals of care discussion;  patient and family now have opted for hospice and comfort care only, plan for LTC with hospice.

## 2023-09-14 NOTE — PROGRESS NOTE ADULT - PROBLEM SELECTOR PLAN 5
Clinical evidence indicates that the patient has Severe protein calorie malnutrition/ 3rd degree. Albumin 2.3    In context of      Chronic Illness (>1 month)    Energy/Food intake <50% of estimated energy requirement >5 days  Weight loss: Moderate - severe   Body Fat loss: Severe   Cachexia, temporal wasting, muscle atrophy  Muscle mass loss: Severe, most recent albumin 2.3   Strength: weakened severe bedbound    Recommend:   pleasure feeds as tolerated - aspiration precautions, careful hand-feeding, teaching to caregivers  nutritional supplements as tolerated, nutrition consult    No feeding tube

## 2023-09-14 NOTE — PROGRESS NOTE ADULT - ASSESSMENT
Confidential Drug Utilization Report  Search Terms: ESTEBAN BIRD, 1960 Search Date: 09/02/2023 12:22:58 PM  The Drug Utilization Report below displays all of the controlled substance prescriptions, if any, that your patient has filled in the last twelve months. The information displayed on this report is compiled from pharmacy submissions to the Department, and accurately reflects the information as submitted by the pharmacies.    This report was requested by: Eveline Rose | Reference #: [id number]    You have not added a MARIA INES number. Keeping your MARIA INES number(s) up to date on the My MARIA INES # tab will enable the separation of your prescriptions from others in the search results.    Practitioner Count: 0  Pharmacy Count: 0  Current Opioid Prescriptions: 0  Current Benzodiazepine Prescriptions: 0  Current Stimulant Prescriptions: 0      Patient Demographic Information (PDI)       PDI	First Name	Last Name	Birth Date	Gender	Street Address	[facility] Code  A	Esteban Bird	1960	Male	[address]	[place]	[id number]    Prescription Information      PDI Filter:    PDI	Current Rx	Drug Type	Rx Written	Rx Dispensed	Drug	Quantity	Days Supply	Prescriber Name	Prescriber MARIA INES #	Payment Method	Dispenser  A	N	O	03/10/2023	03/28/2023	fentanyl 75 mcg/hr patch	10	30	Dick Jack	[id number]	Insurance	[id number]  A	N	O	02/13/2023	02/13/2023	oxycodone-acetaminophen  mg tab	120	30	GondalJuancho MD	[id number]	Insurance	[id number]  A	N	O	11/21/2022	11/21/2022	tramadol hcl 50 mg tablet	20	6	GondalJuancho MD	[id number]	Insurance	[id number]  A	N	O	10/31/2022	10/31/2022	oxycodone-acetaminophen  mg tab	120	30	GondalJuancho MD	[id number]	Insurance	[id number]  A	N	O	09/06/2022	09/06/2022	oxycodone-acetaminophen  mg tab	120	30	GondalJuancho MD	[id number]	Insurance	[id number]    * - Details of Drug Type : O = Opioid, B = Benzodiazepine, S = Stimulant    * - Drugs marked with an asterisk are compound drugs. If the compound drug is made up of more than one controlled substance, then each controlled substance will be a separate row in the table.

## 2023-09-14 NOTE — PROGRESS NOTE ADULT - REASON FOR ADMISSION
Septic Shock 2/2 neutropenic fever

## 2023-09-14 NOTE — PROGRESS NOTE ADULT - SUBJECTIVE AND OBJECTIVE BOX
NP Note discussed with  primary attending    Patient is a 63y old  Male who presents with a chief complaint of Septic Shock 2/2 neutropenic fever (14 Sep 2023 11:58)      INTERVAL HPI/OVERNIGHT EVENTS: no new complaints, pt seen at bedside, appearing lethargic, pale and dry.     MEDICATIONS  (STANDING):  fentaNYL   Patch  25 MICROgram(s)/Hr 1 Patch Transdermal every 72 hours  melatonin 3 milliGRAM(s) Oral at bedtime  pantoprazole    Tablet 40 milliGRAM(s) Oral before breakfast  phenazopyridine 100 milliGRAM(s) Oral every 8 hours  polyethylene glycol 3350 17 Gram(s) Oral two times a day  senna 2 Tablet(s) Oral at bedtime    MEDICATIONS  (PRN):  acetaminophen     Tablet .. 1000 milliGRAM(s) Oral every 8 hours PRN Moderate Pain (4 - 6)  bisacodyl 5 milliGRAM(s) Oral every 12 hours PRN Constipation  calamine/zinc oxide Lotion 1 Application(s) Topical two times a day PRN Rash and/or Itching  lidocaine 2% Jelly 5 milliLiter(s) IntraUrethral every 12 hours PRN Pain at koehler site  oxyCODONE    IR 10 milliGRAM(s) Oral every 4 hours PRN Severe Pain (7 - 10)      __________________________________________________  REVIEW OF SYSTEMS:    CONSTITUTIONAL: No fever,   EYES: no acute visual disturbances  NECK: No pain or stiffness  RESPIRATORY: No cough; No shortness of breath  CARDIOVASCULAR: No chest pain, no palpitations  GASTROINTESTINAL: No pain. No nausea or vomiting; No diarrhea   NEUROLOGICAL: No headache or numbness, no tremors, pale and dry   MUSCULOSKELETAL: No joint pain, no muscle pain  GENITOURINARY: no dysuria, no frequency, no hesitancy  PSYCHIATRY: no depression , no anxiety  ALL OTHER  ROS negative        Vital Signs Last 24 Hrs  T(C): 36.7 (14 Sep 2023 05:14), Max: 36.7 (13 Sep 2023 20:56)  T(F): 98 (14 Sep 2023 05:14), Max: 98 (13 Sep 2023 20:56)  HR: 102 (14 Sep 2023 05:14) (102 - 105)  BP: 97/62 (14 Sep 2023 05:14) (90/66 - 97/62)  BP(mean): 71 (13 Sep 2023 14:17) (71 - 71)  RR: 18 (14 Sep 2023 05:14) (18 - 18)  SpO2: 98% (14 Sep 2023 05:14) (95% - 98%)    Parameters below as of 14 Sep 2023 05:14  Patient On (Oxygen Delivery Method): room air        ________________________________________________  PHYSICAL EXAM:  GENERAL: NAD  HEENT: Normocephalic;  conjunctivae and sclerae clear; moist mucous membranes;   NECK : supple  CHEST/LUNG: Clear to ausculitation bilaterally with good air entry   HEART: S1 S2  regular; no murmurs, gallops or rubs  ABDOMEN: Soft, Nontender, Nondistended; Bowel sounds present  EXTREMITIES: no cyanosis; no edema; no calf tenderness  SKIN: warm and dry; no rash  NERVOUS SYSTEM:  Awake and alert; Oriented  to place, person and time ; no new deficits, pale and dry     _________________________________________________  LABS:              CAPILLARY BLOOD GLUCOSE            RADIOLOGY & ADDITIONAL TESTS:  < from: US Renal (08.31.23 @ 14:16) >  IMPRESSION:  Increased renal cortical echogenicity compatible with medical renal   disease.    Mild left hydronephrosis.    Urinary bladder is incompletely collapsed around a Koehler catheter.      < end of copied text >      Imaging Personally Reviewed:  YES/NO    Consultant(s) Notes Reviewed:   YES/ No    Care Discussed with Consultants :     Plan of care was discussed with patient and /or primary care giver; all questions and concerns were addressed and care was aligned with patient's wishes.

## 2023-09-14 NOTE — PHARMACOTHERAPY INTERVENTION NOTE - INTERVENTION TYPE RECOOMEND
Duration of Therapy Recommended
Therapy Discontinuation Recommended - No indication
Duration of Therapy Recommended
Therapy Recommended - Formulary adherence

## 2023-09-14 NOTE — PROGRESS NOTE ADULT - PROVIDER SPECIALTY LIST ADULT
Cardiology
Heme/Onc
Internal Medicine
Cardiology
Critical Care
Infectious Disease
Internal Medicine
Nephrology
Nephrology
Cardiology
Critical Care
Heme/Onc
Infectious Disease
Infectious Disease
Internal Medicine
Nephrology
Palliative Care
Palliative Care
Urology
Critical Care
Internal Medicine
Nephrology
Internal Medicine
Pain Medicine
Internal Medicine
Pain Medicine
Nephrology
Pain Medicine
Internal Medicine
Pain Medicine
Internal Medicine

## 2023-09-14 NOTE — PROGRESS NOTE ADULT - PROBLEM SELECTOR PROBLEM 8
Discharge planning issues
Pancytopenia due to chemotherapy
Discharge planning issues
Pancytopenia due to chemotherapy
Discharge planning issues

## 2023-09-14 NOTE — PROGRESS NOTE ADULT - PROBLEM SELECTOR PLAN 1
Pt with suprapubic and generalized back pain which is somatic in nature due to history of prostate CA with mets to liver and bone, on Cabazitaxel q3 weeks. Patient presented to the ED after a witnessed episode of loss of consciousness and fall, with head trauma. CT head demonstrated no intracranial hemorrhage or mass. Calvarial metastases. US Kidney and Bladder demonstrate increased renal cortical echogenicity compatible with medical renal disease. Mild left hydronephrosis.   Patient follows oncologist Dr. Nasir Gondal as outpatient. Per [facility] pt was previously on Fentanyl patch 75 mcg/hr and Percocet.    Opioid pain recommendations:  - Continue Fentanyl patch 25 mcg/hr q72hr.- Family and patient refusing increasing dose at this time. Monitor for sedation/ respiratory depression.   - Continue oxycodone 10mg PO q4h PRN severe pain (adjusted 9/13). Monitor for sedation/ respiratory depression.   Non-opioid pain recommendations   - Avoid NSAIDS due to JOSE ANGEL   - Continue acetaminophen 1g q 8hrs PRN for moderate pain.   Bowel Regimen   - Continue senna 2tabs PO at bedtime. Hold for loose stool/ diarrhea  - Continue miralax 17G PO BID (increased 9/13). Hold for loose stool/ diarrhea  - Continue Dulcolax 5mg PO BID PRN constipation.

## 2023-09-14 NOTE — PROGRESS NOTE ADULT - PROBLEM SELECTOR PLAN 1
Pain Management service recommendations appreciated, pain control overall improved    Continue Fentanyl patch 25 mcg Q 72 hours  Continue with oxycodone IR 10 mg PO Q 4 hrs PRN breakthrough pain  -Bowel regimen in place, meds recently adjusted--offered patient Dulcolax suppository, he refused  -Comfort measures only  -DNR/DNI

## 2023-09-14 NOTE — PHARMACOTHERAPY INTERVENTION NOTE - COMMENTS
Therapy completed
Ordered for additional 7 days
ID approval given by Dr. iMller for meropenem
Zarxio is available to order
Informed prescriber about the  fentanyl order per report and to reorder, if medically necessary.

## 2023-09-15 VITALS
OXYGEN SATURATION: 97 % | SYSTOLIC BLOOD PRESSURE: 97 MMHG | RESPIRATION RATE: 16 BRPM | HEART RATE: 103 BPM | DIASTOLIC BLOOD PRESSURE: 57 MMHG

## 2023-09-15 PROCEDURE — 74176 CT ABD & PELVIS W/O CONTRAST: CPT | Mod: MA

## 2023-09-15 PROCEDURE — 87640 STAPH A DNA AMP PROBE: CPT

## 2023-09-15 PROCEDURE — 96376 TX/PRO/DX INJ SAME DRUG ADON: CPT

## 2023-09-15 PROCEDURE — 93306 TTE W/DOPPLER COMPLETE: CPT

## 2023-09-15 PROCEDURE — 87389 HIV-1 AG W/HIV-1&-2 AB AG IA: CPT

## 2023-09-15 PROCEDURE — 87040 BLOOD CULTURE FOR BACTERIA: CPT

## 2023-09-15 PROCEDURE — 84132 ASSAY OF SERUM POTASSIUM: CPT

## 2023-09-15 PROCEDURE — 82962 GLUCOSE BLOOD TEST: CPT

## 2023-09-15 PROCEDURE — 72125 CT NECK SPINE W/O DYE: CPT | Mod: MA

## 2023-09-15 PROCEDURE — 70450 CT HEAD/BRAIN W/O DYE: CPT | Mod: MA

## 2023-09-15 PROCEDURE — 85610 PROTHROMBIN TIME: CPT

## 2023-09-15 PROCEDURE — P9040: CPT

## 2023-09-15 PROCEDURE — 71045 X-RAY EXAM CHEST 1 VIEW: CPT

## 2023-09-15 PROCEDURE — 83605 ASSAY OF LACTIC ACID: CPT

## 2023-09-15 PROCEDURE — 84156 ASSAY OF PROTEIN URINE: CPT

## 2023-09-15 PROCEDURE — 84484 ASSAY OF TROPONIN QUANT: CPT

## 2023-09-15 PROCEDURE — 87186 SC STD MICRODIL/AGAR DIL: CPT

## 2023-09-15 PROCEDURE — 82330 ASSAY OF CALCIUM: CPT

## 2023-09-15 PROCEDURE — 86480 TB TEST CELL IMMUN MEASURE: CPT

## 2023-09-15 PROCEDURE — 86901 BLOOD TYPING SEROLOGIC RH(D): CPT

## 2023-09-15 PROCEDURE — 0225U NFCT DS DNA&RNA 21 SARSCOV2: CPT

## 2023-09-15 PROCEDURE — 85045 AUTOMATED RETICULOCYTE COUNT: CPT

## 2023-09-15 PROCEDURE — 82550 ASSAY OF CK (CPK): CPT

## 2023-09-15 PROCEDURE — 85027 COMPLETE CBC AUTOMATED: CPT

## 2023-09-15 PROCEDURE — P9037: CPT

## 2023-09-15 PROCEDURE — 86900 BLOOD TYPING SEROLOGIC ABO: CPT

## 2023-09-15 PROCEDURE — 85730 THROMBOPLASTIN TIME PARTIAL: CPT

## 2023-09-15 PROCEDURE — 82247 BILIRUBIN TOTAL: CPT

## 2023-09-15 PROCEDURE — 84100 ASSAY OF PHOSPHORUS: CPT

## 2023-09-15 PROCEDURE — 87635 SARS-COV-2 COVID-19 AMP PRB: CPT

## 2023-09-15 PROCEDURE — 84295 ASSAY OF SERUM SODIUM: CPT

## 2023-09-15 PROCEDURE — 80074 ACUTE HEPATITIS PANEL: CPT

## 2023-09-15 PROCEDURE — 87641 MR-STAPH DNA AMP PROBE: CPT

## 2023-09-15 PROCEDURE — 94660 CPAP INITIATION&MGMT: CPT

## 2023-09-15 PROCEDURE — 85384 FIBRINOGEN ACTIVITY: CPT

## 2023-09-15 PROCEDURE — 86923 COMPATIBILITY TEST ELECTRIC: CPT

## 2023-09-15 PROCEDURE — 81001 URINALYSIS AUTO W/SCOPE: CPT

## 2023-09-15 PROCEDURE — 85379 FIBRIN DEGRADATION QUANT: CPT

## 2023-09-15 PROCEDURE — 36430 TRANSFUSION BLD/BLD COMPNT: CPT

## 2023-09-15 PROCEDURE — 99285 EMERGENCY DEPT VISIT HI MDM: CPT | Mod: 25

## 2023-09-15 PROCEDURE — 76775 US EXAM ABDO BACK WALL LIM: CPT

## 2023-09-15 PROCEDURE — 82553 CREATINE MB FRACTION: CPT

## 2023-09-15 PROCEDURE — 96365 THER/PROPH/DIAG IV INF INIT: CPT

## 2023-09-15 PROCEDURE — 83615 LACTATE (LD) (LDH) ENZYME: CPT

## 2023-09-15 PROCEDURE — 83935 ASSAY OF URINE OSMOLALITY: CPT

## 2023-09-15 PROCEDURE — 96375 TX/PRO/DX INJ NEW DRUG ADDON: CPT

## 2023-09-15 PROCEDURE — 36415 COLL VENOUS BLD VENIPUNCTURE: CPT

## 2023-09-15 PROCEDURE — 82570 ASSAY OF URINE CREATININE: CPT

## 2023-09-15 PROCEDURE — 85025 COMPLETE CBC W/AUTO DIFF WBC: CPT

## 2023-09-15 PROCEDURE — 82803 BLOOD GASES ANY COMBINATION: CPT

## 2023-09-15 PROCEDURE — 87086 URINE CULTURE/COLONY COUNT: CPT

## 2023-09-15 PROCEDURE — 80048 BASIC METABOLIC PNL TOTAL CA: CPT

## 2023-09-15 PROCEDURE — 83735 ASSAY OF MAGNESIUM: CPT

## 2023-09-15 PROCEDURE — 86850 RBC ANTIBODY SCREEN: CPT

## 2023-09-15 PROCEDURE — P9100: CPT

## 2023-09-15 PROCEDURE — 83880 ASSAY OF NATRIURETIC PEPTIDE: CPT

## 2023-09-15 PROCEDURE — 76857 US EXAM PELVIC LIMITED: CPT

## 2023-09-15 PROCEDURE — 85397 CLOTTING FUNCT ACTIVITY: CPT

## 2023-09-15 PROCEDURE — 80053 COMPREHEN METABOLIC PANEL: CPT

## 2023-09-15 PROCEDURE — 84300 ASSAY OF URINE SODIUM: CPT

## 2023-09-15 PROCEDURE — 93005 ELECTROCARDIOGRAM TRACING: CPT

## 2023-09-15 PROCEDURE — 80202 ASSAY OF VANCOMYCIN: CPT

## 2023-09-15 RX ORDER — FENTANYL CITRATE 50 UG/ML
1 INJECTION INTRAVENOUS
Qty: 0 | Refills: 0 | DISCHARGE
Start: 2023-09-15

## 2023-09-15 RX ORDER — ACETAMINOPHEN 500 MG
2 TABLET ORAL
Qty: 0 | Refills: 0 | DISCHARGE
Start: 2023-09-15

## 2023-09-15 RX ORDER — PANTOPRAZOLE SODIUM 20 MG/1
1 TABLET, DELAYED RELEASE ORAL
Qty: 0 | Refills: 0 | DISCHARGE
Start: 2023-09-15

## 2023-09-15 RX ORDER — OXYCODONE AND ACETAMINOPHEN 5; 325 MG/1; MG/1
1 TABLET ORAL
Refills: 0 | DISCHARGE

## 2023-09-15 RX ORDER — SENNA PLUS 8.6 MG/1
2 TABLET ORAL
Qty: 0 | Refills: 0 | DISCHARGE
Start: 2023-09-15

## 2023-09-15 RX ORDER — TAMSULOSIN HYDROCHLORIDE 0.4 MG/1
1 CAPSULE ORAL
Qty: 0 | Refills: 0 | DISCHARGE

## 2023-09-15 RX ORDER — LIDOCAINE 4 G/100G
1 CREAM TOPICAL
Qty: 0 | Refills: 0 | DISCHARGE
Start: 2023-09-15

## 2023-09-15 RX ORDER — OXYCODONE HYDROCHLORIDE 5 MG/1
1 TABLET ORAL
Qty: 0 | Refills: 0 | DISCHARGE
Start: 2023-09-15

## 2023-09-15 RX ORDER — CALAMINE AND ZINC OXIDE AND PHENOL 160; 10 MG/ML; MG/ML
1 LOTION TOPICAL
Qty: 0 | Refills: 0 | DISCHARGE
Start: 2023-09-15

## 2023-09-15 RX ORDER — POLYETHYLENE GLYCOL 3350 17 G/17G
17 POWDER, FOR SOLUTION ORAL
Qty: 0 | Refills: 0 | DISCHARGE
Start: 2023-09-15

## 2023-09-15 RX ADMIN — OXYCODONE HYDROCHLORIDE 10 MILLIGRAM(S): 5 TABLET ORAL at 06:00

## 2023-09-15 RX ADMIN — PANTOPRAZOLE SODIUM 40 MILLIGRAM(S): 20 TABLET, DELAYED RELEASE ORAL at 05:18

## 2023-09-15 RX ADMIN — OXYCODONE HYDROCHLORIDE 10 MILLIGRAM(S): 5 TABLET ORAL at 05:18

## 2023-09-15 NOTE — DISCHARGE NOTE NURSING/CASE MANAGEMENT/SOCIAL WORK - NSDCPEFALRISK_GEN_ALL_CORE
For information on Fall & Injury Prevention, visit: [email or web address] OR  [email or web address] OR  https://www.cdc.gov/steadi/patient.html

## 2023-09-15 NOTE — DISCHARGE NOTE NURSING/CASE MANAGEMENT/SOCIAL WORK - PATIENT PORTAL LINK FT
You can access the FollowMyHealth Patient Portal offered by [facility] by registering at the following website: [email or web address]. By joining [email or web address] [email or web address] portal, you will also be able to view your health information using other applications (apps) compatible with our system.

## 2024-01-04 NOTE — DISCHARGE NOTE NURSING/CASE MANAGEMENT/SOCIAL WORK - NSDCVIVACCINE_GEN_ALL_CORE_FT
"Chief Complaint   Patient presents with    Follow-up    Hypertension    Prediabetes       Subjective      Hermelindo Newman is a 46 y.o. who presents for HTN, HL, prediabetes, Met. Syndrome. HE would like to delay lab assessment due to poor eating habits over the holidays. Otherwise he takes medicine as prescribed and denies changes in health. Weight has remained stable.     The following portions of the patient's history were reviewed and updated as appropriate: allergies, current medications, past family history, past medical history, past social history, past surgical history, and problem list.    Review of Systems    Objective   Vital Signs:  /78   Pulse 81   Temp 97.8 °F (36.6 °C)   Resp 20   Ht 170.2 cm (67.01\")   Wt 109 kg (241 lb 6.4 oz)   SpO2 96%   BMI 37.80 kg/m²               Physical Exam  Vitals reviewed.   Constitutional:       Appearance: Normal appearance.   HENT:      Head: Normocephalic.      Nose: Nose normal.   Cardiovascular:      Rate and Rhythm: Normal rate and regular rhythm.      Pulses: Normal pulses.      Heart sounds: Normal heart sounds.   Pulmonary:      Effort: Pulmonary effort is normal.      Breath sounds: Normal breath sounds.   Abdominal:      Palpations: Abdomen is soft.      Comments: Extra weight around the middle   Neurological:      Mental Status: He is alert.          Result Review                     Assessment and Plan  Diagnoses and all orders for this visit:    1. Essential hypertension (Primary)  Comments:  .  Assessment & Plan:  Hypertension is  controlled .  Continue current treatment regimen.  Dietary sodium restriction.  Weight loss.  Continue current medications.  Ambulatory blood pressure monitoring.  Blood pressure will be reassessed at the next regular appointment.    Orders:  -     losartan (COZAAR) 50 MG tablet; Take 1 tablet by mouth Daily.  Dispense: 90 tablet; Refill: 3  -     Comprehensive Metabolic Panel; Future    2. " Hypercholesterolemia  Assessment & Plan:  Lipid abnormalities are unchanged.  Nutritional counseling was provided.  Lipids will be reassessed in 6 months.    Orders:  -     Lipid Panel; Future    3. Class 2 severe obesity due to excess calories with serious comorbidity and body mass index (BMI) of 38.0 to 38.9 in adult  -     Hemoglobin A1c; Future  -     Comprehensive Metabolic Panel; Future  -     Lipid Panel; Future    4. Prediabetes  -     Hemoglobin A1c; Future    5. Metabolic syndrome  Assessment & Plan:  Discussed weight loss. Insurance would cover AOM. He would prefer Lifestyle changes              Follow Up  Return in about 6 months (around 7/4/2024) for Next scheduled follow up.  Patient was given instructions and counseling regarding his condition or for health maintenance advice. Please see specific information pulled into the AVS if appropriate.        No Vaccines Administered.

## 2024-10-30 NOTE — PROGRESS NOTE ADULT - SUBJECTIVE AND OBJECTIVE BOX
63y Male    Meds:  cefTRIAXone   IVPB 1000 milliGRAM(s) IV Intermittent every 24 hours    Allergies    No Known Allergies    Intolerances        VITALS:  Vital Signs Last 24 Hrs  T(C): 36.5 (05 Sep 2023 05:36), Max: 36.7 (04 Sep 2023 20:34)  T(F): 97.7 (05 Sep 2023 05:36), Max: 98.1 (04 Sep 2023 20:34)  HR: 67 (05 Sep 2023 05:36) (67 - 73)  BP: 104/64 (05 Sep 2023 05:36) (104/64 - 110/67)  BP(mean): --  RR: 18 (05 Sep 2023 05:36) (16 - 18)  SpO2: 95% (05 Sep 2023 05:36) (95% - 97%)    Parameters below as of 05 Sep 2023 05:36  Patient On (Oxygen Delivery Method): room air        LABS/DIAGNOSTIC TESTS:                          9.2    14.98 )-----------( 26       ( 05 Sep 2023 06:39 )             27.6         09-05    137  |  103  |  124<H>  ----------------------------<  149<H>  3.5   |  18<L>  |  4.17<H>    Ca    6.1<LL>      05 Sep 2023 06:39  Phos  5.6     09-04  Mg     2.5     09-04    TPro  6.1  /  Alb  2.1<L>  /  TBili  0.5  /  DBili  x   /  AST  10  /  ALT  16  /  AlkPhos  166<H>  09-05      LIVER FUNCTIONS - ( 05 Sep 2023 06:39 )  Alb: 2.1 g/dL / Pro: 6.1 g/dL / ALK PHOS: 166 U/L / ALT: 16 U/L DA / AST: 10 U/L / GGT: x             CULTURES: Clean Catch Clean Catch (Midstream)  08-30 @ 12:55   10,000 - 49,000 CFU/mL Escherichia coli  --  Escherichia coli      .Blood Blood-Peripheral  08-29 @ 21:40   No growth at 5 days  --  --            RADIOLOGY:      ROS:  [  ] UNABLE TO ELICIT Patient is getting released soon from [facility] on Tuesday. She been in there for about a month now. She is still having issues being very weak, dizziness, diarrhea about 3x weekly. She is just very exhausted even when she is not doing anything.. patient is just worried since she is not feeling any better.  Next opening with you is 11/06   63y Male who is doing better clinically as far as his UTI and infection go , he has no fevers or chills, no diarrhea , he c/o pain from his cancer and his family and the patient have decided that he will not be getting anymore chemotherapy. He has no fevers or chills, no urinary symptoms , no SOB or diarrhea.     Meds:  cefTRIAXone   IVPB 1000 milliGRAM(s) IV Intermittent every 24 hours    Allergies    No Known Allergies    Intolerances        VITALS:  Vital Signs Last 24 Hrs  T(C): 36.5 (05 Sep 2023 05:36), Max: 36.7 (04 Sep 2023 20:34)  T(F): 97.7 (05 Sep 2023 05:36), Max: 98.1 (04 Sep 2023 20:34)  HR: 67 (05 Sep 2023 05:36) (67 - 73)  BP: 104/64 (05 Sep 2023 05:36) (104/64 - 110/67)  BP(mean): --  RR: 18 (05 Sep 2023 05:36) (16 - 18)  SpO2: 95% (05 Sep 2023 05:36) (95% - 97%)    Parameters below as of 05 Sep 2023 05:36  Patient On (Oxygen Delivery Method): room air        LABS/DIAGNOSTIC TESTS:                          9.2    14.98 )-----------( 26       ( 05 Sep 2023 06:39 )             27.6         09-05    137  |  103  |  124<H>  ----------------------------<  149<H>  3.5   |  18<L>  |  4.17<H>    Ca    6.1<LL>      05 Sep 2023 06:39  Phos  5.6     09-04  Mg     2.5     09-04    TPro  6.1  /  Alb  2.1<L>  /  TBili  0.5  /  DBili  x   /  AST  10  /  ALT  16  /  AlkPhos  166<H>  09-05      LIVER FUNCTIONS - ( 05 Sep 2023 06:39 )  Alb: 2.1 g/dL / Pro: 6.1 g/dL / ALK PHOS: 166 U/L / ALT: 16 U/L DA / AST: 10 U/L / GGT: x             CULTURES: Clean Catch Clean Catch (Midstream)  08-30 @ 12:55   10,000 - 49,000 CFU/mL Escherichia coli  --  Escherichia coli      .Blood Blood-Peripheral  08-29 @ 21:40   No growth at 5 days  --  --            RADIOLOGY:      ROS:  [  ] UNABLE TO ELICIT

## 2025-05-27 NOTE — PROGRESS NOTE ADULT - PROBLEM SELECTOR PLAN 2
Patient: Riky Nixon (48 year old, male) Referring Provider:  Insurance:   Diagnosis: Lateral epicondylitis of right elbow (M77.11) Alen Vences  [facility]   Date of Surgery: NA Next MD visit:  [employer] OUT OF STATE   Precautions:  Drug Allergy TBD Referral Information:   Date of Injury: Late November 2024 Date of Evaluation: Req: 0, Auth: 0, Exp:     05/06/25 POC Auth Visits:  8       Today's Date   5/27/2025    Subjective  \"I power washed the deck on Sunday which annoyed both elbows.  But today I'm fine\"       Pain: 0/10     Objective  Continued mild difficulty with scapular stabilization tasks, continued forward posture at rest           Assessment  PREs performed with no lasting increase in elbow pain, though pt did note discomfort/fatigue in shoulders. Pain well managed throughoout all exercises today including with progressive lifting tasks.  Anticipate completion of final scheduled visit, then d/c to HEP only.    Goals (to be met in 8 visits)      Not Met Progress Toward Partially Met Met   Patient will report no more than 0/10 pain during self-care skill performance. [] [x] [] []   Pt will demonstrate independence and compliance with HEP to maximize gains made in occupational therapy and progress toward functional independence.    [] [] [x] []   Patient will demonstrate independent awareness of postural corrections to provide proximal stability for distal limb movements. [] [] [x] []   Patient to verbalize and demonstrate understanding of aggravating and alleviating factors related to lateral epicondylitis to decrease risk of recurrence for improved functional use of right UE. [] [] [] [x]   Pt will increase  in stress position to equal to L with 0/10 pain for use while manipulating mouse and controls [] [x] [] []    [] [] [] []             Plan  Measurements for d/c    Treatment Last 4 Visits        5/15/2025 5/20/2025 5/22/2025 5/27/2025   OT Treatment   Treatment Day 4 5 6 7    Therapeutic Exercise Wrist stabilization  Writing alphabet on weighted ball with dowel    Digiflex   Blue full  x 20  Green each digit x 15  Tripod pinch x 20    PREs (pain free range)  2 lb  Bicep curl  Triceps extension   Each x 20    Eccentric load and lengthen with flex bar  Wrist extension    Wrist flexion  Each x 20     Wall weight bands light  -high row: scapular retraction  -low pronated row  -triceps extension  -bicep curl  -shoulder extension  Each x 20    Scapular stabilization  Wall alphabet on ball     2 lb ball  Ball pass x 20  Overhead/behind back pass 10/10 Body blade  45 sec x 4    Yellow theraband  Hammer curl  Triceps extension  Wrist flexion  Wrist extension  Pronation  Supination  Shoulder flexion  Shoulder extension  ER  IR  Each x 20    Weighted bar  Bicep curl  Overhead press  Wrist rolls: flexion  Extension  Each x 20   TRX  Pull up position x 20  Push up position x 20  Scapular stabilization x 15    Wall weights  Low row 20 lb  Bicep curl 15 lb  Triceps extension 15 lb  Each x 20    Rebounder with 2 lb ball    beige Flex bar  -wrist flexion  -wrist extension  -bar bend  -reverse bar bend  -supination  -pronation  Each x 20    Gripper black level 3 x 20      Manual Therapy Skin mobilization posterior elbow    Fascia mobilization over wrist extensors into upper arm    Passive ROM to wrist and elbow    Combined motions to end range    Self stretch wrist and elbow over ball IASTM  Sweep/scoop through wrist extensor wad  Framing to lateral epincondyle  Strumming to trigger points         Modalities IFC, quad, sweep to pt tolerance, 10 min with ice IFC, quad, target to lateral epicondyle, 10 min to pt tolerance with ice     Therapeutic Exercise Min 20 20 40 40   Manual Therapy Min 15 15     E-Stim Unattended Min 10 10     Total Timed Procedures 35 35 40 40   Total Service Procedures 45 45 40 40   Total Time 45 45 40 40         HEP   Review scapular stabilization, posture    Charges     3  TE    Griselda Reeder, OTR/L                           now resolved   S/P ICU admission   requiring pressors   urine culture revealed e.coli 10,000-49,000   blood cultures NGTD   s/p meropenem switched to Rocephin   pt now comfort measures and mews exempt  pending LTC w/ hospice, SW following.   ID Dr. Manrique following
